# Patient Record
Sex: MALE | Race: WHITE | NOT HISPANIC OR LATINO | Employment: OTHER | URBAN - METROPOLITAN AREA
[De-identification: names, ages, dates, MRNs, and addresses within clinical notes are randomized per-mention and may not be internally consistent; named-entity substitution may affect disease eponyms.]

---

## 2017-05-18 ENCOUNTER — OFFICE VISIT (OUTPATIENT)
Dept: RHEUMATOLOGY | Facility: CLINIC | Age: 77
End: 2017-05-18
Payer: MEDICARE

## 2017-05-18 VITALS
WEIGHT: 248.5 LBS | HEIGHT: 72 IN | BODY MASS INDEX: 33.66 KG/M2 | SYSTOLIC BLOOD PRESSURE: 134 MMHG | DIASTOLIC BLOOD PRESSURE: 76 MMHG

## 2017-05-18 DIAGNOSIS — M1A.09X0 IDIOPATHIC CHRONIC GOUT OF MULTIPLE SITES WITHOUT TOPHUS: Primary | ICD-10-CM

## 2017-05-18 DIAGNOSIS — M05.79 RHEUMATOID ARTHRITIS INVOLVING MULTIPLE SITES WITH POSITIVE RHEUMATOID FACTOR: ICD-10-CM

## 2017-05-18 LAB
ALBUMIN SERPL-MCNC: 4.7 G/DL (ref 3.1–4.7)
ALP SERPL-CCNC: 60 IU/L (ref 40–104)
ALT (SGPT): 22 IU/L (ref 3–33)
AST SERPL-CCNC: 23 IU/L (ref 10–40)
BASOPHILS NFR BLD: 0.1 K/UL (ref 0–0.2)
BASOPHILS NFR BLD: 1.1 %
BILIRUB SERPL-MCNC: 1.4 MG/DL (ref 0.3–1)
BUN SERPL-MCNC: 17 MG/DL (ref 8–20)
CALCIUM SERPL-MCNC: 9.5 MG/DL (ref 7.7–10.4)
CHLORIDE: 103 MMOL/L (ref 98–110)
CO2 SERPL-SCNC: 31.4 MMOL/L (ref 22.8–31.6)
CREATININE: 1.1 MG/DL (ref 0.6–1.4)
CRP SERPL-MCNC: 0.32 MG/DL (ref 0–1.4)
EOSINOPHIL NFR BLD: 0.2 K/UL (ref 0–0.7)
EOSINOPHIL NFR BLD: 2.3 %
ERYTHROCYTE [DISTWIDTH] IN BLOOD BY AUTOMATED COUNT: 13.2 % (ref 11.7–14.9)
GLUCOSE: 99 MG/DL (ref 70–99)
GRAN #: 3.9 K/UL (ref 1.4–6.5)
GRAN%: 60.5 %
HCT VFR BLD AUTO: 39.6 % (ref 39–55)
HGB BLD-MCNC: 13.7 G/DL (ref 14–16)
IMMATURE GRANS (ABS): 0 K/UL (ref 0–1)
IMMATURE GRANULOCYTES: 0.3 %
LYMPH #: 1.7 K/UL (ref 1.2–3.4)
LYMPH%: 26.6 %
MCH RBC QN AUTO: 31.9 PG (ref 25–35)
MCHC RBC AUTO-ENTMCNC: 34.6 G/DL (ref 31–36)
MCV RBC AUTO: 92.3 FL (ref 80–100)
MONO #: 0.6 K/UL (ref 0.1–0.6)
MONO%: 9.2 %
NUCLEATED RBCS: 0 %
PLATELET # BLD AUTO: 128 K/UL (ref 140–440)
PMV BLD AUTO: 11.2 FL (ref 8.8–12.7)
POTASSIUM SERPL-SCNC: 4.6 MMOL/L (ref 3.5–5)
PROT SERPL-MCNC: 7.8 G/DL (ref 6–8.2)
RBC # BLD AUTO: 4.29 M/UL (ref 4.3–5.9)
SODIUM: 141 MMOL/L (ref 134–144)
WBC # BLD AUTO: 6.5 K/UL (ref 5–10)

## 2017-05-18 PROCEDURE — 1160F RVW MEDS BY RX/DR IN RCRD: CPT | Mod: ,,, | Performed by: INTERNAL MEDICINE

## 2017-05-18 PROCEDURE — 1159F MED LIST DOCD IN RCRD: CPT | Mod: ,,, | Performed by: INTERNAL MEDICINE

## 2017-05-18 PROCEDURE — 99214 OFFICE O/P EST MOD 30 MIN: CPT | Mod: ,,, | Performed by: INTERNAL MEDICINE

## 2017-05-18 PROCEDURE — 3075F SYST BP GE 130 - 139MM HG: CPT | Mod: ,,, | Performed by: INTERNAL MEDICINE

## 2017-05-18 PROCEDURE — 1157F ADVNC CARE PLAN IN RCRD: CPT | Mod: 8P,,, | Performed by: INTERNAL MEDICINE

## 2017-05-18 PROCEDURE — 3078F DIAST BP <80 MM HG: CPT | Mod: ,,, | Performed by: INTERNAL MEDICINE

## 2017-05-18 RX ORDER — POTASSIUM CHLORIDE 750 MG/1
10 TABLET, EXTENDED RELEASE ORAL DAILY
COMMUNITY

## 2017-05-18 RX ORDER — BENAZEPRIL HYDROCHLORIDE 40 MG/1
40 TABLET ORAL
COMMUNITY

## 2017-05-18 RX ORDER — BUMETANIDE 2 MG/1
1 TABLET ORAL
COMMUNITY
End: 2018-09-25 | Stop reason: CLARIF

## 2017-05-18 RX ORDER — ALLOPURINOL 300 MG/1
300 TABLET ORAL DAILY
Qty: 90 TABLET | Refills: 3 | Status: SHIPPED | OUTPATIENT
Start: 2017-05-18 | End: 2017-09-21 | Stop reason: SDUPTHER

## 2017-05-18 RX ORDER — ALLOPURINOL 300 MG/1
300 TABLET ORAL DAILY
COMMUNITY
End: 2017-05-18 | Stop reason: SDUPTHER

## 2017-05-18 RX ORDER — METOPROLOL TARTRATE 50 MG/1
50 TABLET ORAL
COMMUNITY
End: 2018-09-25 | Stop reason: CLARIF

## 2017-05-18 RX ORDER — TERAZOSIN 5 MG/1
5 CAPSULE ORAL NIGHTLY
COMMUNITY

## 2017-05-18 RX ORDER — HYDROXYCHLOROQUINE SULFATE 200 MG/1
200 TABLET, FILM COATED ORAL 2 TIMES DAILY
COMMUNITY
End: 2018-01-08 | Stop reason: SDUPTHER

## 2017-05-18 RX ORDER — WARFARIN SODIUM 5 MG/1
10 TABLET ORAL
Status: ON HOLD | COMMUNITY
End: 2022-07-13 | Stop reason: HOSPADM

## 2017-05-18 NOTE — PROGRESS NOTES
Subjective:       Patient ID: Kennedy Thomas is a 76 y.o. male.    Chief Complaint: Rheumatoid Arthritis and Gout  The patient has noted a bit more stiffness in several of his digits. This however is not interfering with his ability to perform his ADLs. He has not noted any particular swelling, warmth or redness.  HPI  Review of Systems  the review of systems is negative for rheumatologic phenomena  Objective:      Physical Exam  the physical examination of the joints reveals no red, hot or swollen joints. There is some tenderness noted in the right second and fourth PIP joints. There may be a degree of synovial proliferation present there but there does not seem to be active synovitis. The MCP joints, particularly on the palmar aspect of the hand, demonstrate no inflammation or tenderness. There is also no tendon rub or Dupuytren's-type contracture present.  Assessment:       1. Idiopathic chronic gout of multiple sites without tophus    2. Rheumatoid arthritis involving multiple sites with positive rheumatoid factor      Both disease processes are stable.  Plan:       Kennedy was seen today for rheumatoid arthritis and gout.    Diagnoses and all orders for this visit:    Idiopathic chronic gout of multiple sites without tophus  -     allopurinol (ZYLOPRIM) 300 MG tablet; Take 1 tablet (300 mg total) by mouth once daily.  -     Sedimentation rate, automated; Future    Rheumatoid arthritis involving multiple sites with positive rheumatoid factor  -     CBC auto differential; Future  -     C-reactive protein; Future  -     CBC auto differential  -     C-reactive protein  -     Comprehensive metabolic panel; Future  -     Comprehensive metabolic panel       We will continue his medication without change. I have instructed the patient on the use of a paraffin wax bath at home, to be followed by range of motion exercises utilizing [employer]. I believe the patient understands the instructions and may or may not follow-up  on obtaining the wax bath. I did give him several sources for obtaining the machine and an estimated price of $40-$50.  I will see him back in 4 months or sooner if needed.

## 2017-05-18 NOTE — MR AVS SNAPSHOT
[facility]  [address]  [address]  [place] [address]  Phone: [phone]  Fax: [phone]                  Kennedy Thomas   2017 11:00 AM   Office Visit    Description:  Male : 1940   Provider:  Piero Veronica MD   Department:  [facility]           Reason for Visit     Rheumatoid Arthritis     Gout           Diagnoses this Visit        Comments    Idiopathic chronic gout of multiple sites without tophus    -  Primary     Rheumatoid arthritis involving multiple sites with positive rheumatoid factor                To Do List           Future Appointments        Provider Department Dept Phone    2017 10:30 AM Piero Veronica MD [facility] [phone]      Goals (5 Years of Data)     None      Follow-Up and Disposition     Return in about 4 months (around 2017).    Follow-up and Disposition History       These Medications        Disp Refills Start End    allopurinol (ZYLOPRIM) 300 MG tablet 90 tablet 3 2017     Take 1 tablet (300 mg total) by mouth once daily. - Oral    Pharmacy: [facility] [address] [place] [address] & [facility] Ph #: [phone]              Medications           Message regarding Medications     Verify the changes and/or additions to your medication regime listed below are the same as discussed with your clinician today.  If any of these changes or additions are incorrect, please notify your healthcare provider.        START taking these NEW medications        Refills    allopurinol (ZYLOPRIM) 300 MG tablet 3    Sig: Take 1 tablet (300 mg total) by mouth once daily.    Class: Normal    Route: Oral           Verify that the below list of medications is an accurate representation of the medications you are currently taking.  If none reported, the list may be blank. If incorrect, please contact your healthcare provider. Carry this list with you  in case of emergency.           Current Medications     allopurinol (ZYLOPRIM) 300 MG tablet Take 1 tablet (300 mg total) by mouth once daily.    benazepril (LOTENSIN) 40 MG tablet Take 40 mg by mouth.    bumetanide (BUMEX) 2 MG tablet Take 2 mg by mouth.    hydroxychloroquine (PLAQUENIL) 200 mg tablet Take 200 mg by mouth 2 (two) times daily.    metoprolol tartrate (LOPRESSOR) 50 MG tablet 50 mg.    potassium chloride (KLOR-CON) 10 MEQ TbSR Take 10 mEq by mouth once daily.    terazosin (HYTRIN) 5 MG capsule Take 5 mg by mouth.    warfarin (COUMADIN) 5 MG tablet Take 5 mg by mouth.           Clinical Reference Information           Your Vitals Were     BP Height Weight BMI       134/76 6' (1.829 m) 112.7 kg (248 lb 8 oz) 33.7 kg/m2       Blood Pressure          Most Recent Value    BP  134/76      Allergies as of 5/18/2017     Statins-hmg-coa Reductase Inhibitors      Immunizations Administered on Date of Encounter - 5/18/2017     None      Orders Placed During Today's Visit     Future Labs/Procedures Expected by Expires    C-reactive protein  5/18/2017 7/17/2018    CBC auto differential  5/18/2017 7/17/2018    Comprehensive metabolic panel  5/18/2017 7/17/2018    Sedimentation rate, automated  5/18/2017 7/17/2018

## 2017-09-21 ENCOUNTER — OFFICE VISIT (OUTPATIENT)
Dept: RHEUMATOLOGY | Facility: CLINIC | Age: 77
End: 2017-09-21
Payer: MEDICARE

## 2017-09-21 VITALS
SYSTOLIC BLOOD PRESSURE: 132 MMHG | DIASTOLIC BLOOD PRESSURE: 80 MMHG | BODY MASS INDEX: 32.3 KG/M2 | HEIGHT: 74 IN | WEIGHT: 251.69 LBS

## 2017-09-21 DIAGNOSIS — M10.9 GOUT, UNSPECIFIED CAUSE, UNSPECIFIED CHRONICITY, UNSPECIFIED SITE: Primary | ICD-10-CM

## 2017-09-21 DIAGNOSIS — Z79.899 ENCOUNTER FOR LONG-TERM (CURRENT) USE OF OTHER MEDICATIONS: ICD-10-CM

## 2017-09-21 DIAGNOSIS — M1A.09X0 IDIOPATHIC CHRONIC GOUT OF MULTIPLE SITES WITHOUT TOPHUS: ICD-10-CM

## 2017-09-21 DIAGNOSIS — M19.90 CHRONIC INFLAMMATORY ARTHRITIS: ICD-10-CM

## 2017-09-21 PROCEDURE — 3075F SYST BP GE 130 - 139MM HG: CPT | Mod: ,,, | Performed by: INTERNAL MEDICINE

## 2017-09-21 PROCEDURE — 99213 OFFICE O/P EST LOW 20 MIN: CPT | Mod: ,,, | Performed by: INTERNAL MEDICINE

## 2017-09-21 PROCEDURE — 1159F MED LIST DOCD IN RCRD: CPT | Mod: ,,, | Performed by: INTERNAL MEDICINE

## 2017-09-21 PROCEDURE — 3008F BODY MASS INDEX DOCD: CPT | Mod: ,,, | Performed by: INTERNAL MEDICINE

## 2017-09-21 PROCEDURE — 3079F DIAST BP 80-89 MM HG: CPT | Mod: ,,, | Performed by: INTERNAL MEDICINE

## 2017-09-21 RX ORDER — ALLOPURINOL 300 MG/1
300 TABLET ORAL DAILY
Qty: 90 TABLET | Refills: 3 | Status: SHIPPED | OUTPATIENT
Start: 2017-09-21 | End: 2018-10-03 | Stop reason: SDUPTHER

## 2017-09-21 NOTE — PROGRESS NOTES
[facility]ATOLOG[facility]           Follow-up visit      Subjective:       Patient ID:   NAME: Kennedy Thomas : 1940     76 y.o. male    Referring Doc: No ref. provider found  Other Physicians:    Chief Complaint:  Gout (under both feet feel like big calluses pt states he cant feel any thing)      HPI/Interval History:   The patient is doing fine and has no problems with gouty arthritis.          ROS:   GEN: no fever, night sweats or weight loss  SKIN:  no rashes , erythema, bruising, or swelling, no Raynauds, no photosensitivity  HEENT: no HAs, no changes in vision, no mouth ulcers, no sicca symptoms, no scalp tenderness, jaw claudication.  CV: no CP, SOB, PND, PATTERSON or orthopnea,no palpitations  PULM: no SOB, cough, hemoptysis, sputum or pleuritic pain  GI: no abdominal pain, nausea, vomiting, constipation, diarrhea, melanotic stools, BRBPR, or hematemesis.no dysphagia  : no hematuria, dysuria  NEURO:no paresthesias, headaches, visual disturbances, muscle weakness  MUSCULOSKELETAL: no of acute gouty episodes  PSYCH: No insomnia, no significant depression, no anxiety    Medications:    Current Outpatient Prescriptions:     allopurinol (ZYLOPRIM) 300 MG tablet, Take 1 tablet (300 mg total) by mouth once daily., Disp: 90 tablet, Rfl: 3    benazepril (LOTENSIN) 40 MG tablet, Take 40 mg by mouth., Disp: , Rfl:     bumetanide (BUMEX) 2 MG tablet, Take 2 mg by mouth., Disp: , Rfl:     hydroxychloroquine (PLAQUENIL) 200 mg tablet, Take 200 mg by mouth 2 (two) times daily., Disp: , Rfl:     metoprolol tartrate (LOPRESSOR) 50 MG tablet, 50 mg., Disp: , Rfl:     potassium chloride (KLOR-CON) 10 MEQ TbSR, Take 10 mEq by mouth once daily., Disp: , Rfl:     terazosin (HYTRIN) 5 MG capsule, Take 5 mg by mouth., Disp: , Rfl:     warfarin (COUMADIN) 5 MG tablet, Take 5 mg by mouth., Disp: , Rfl:   FAMILY HISTORY: negative for Connective Tissue Disease        Review of patient's allergies indicates:   Allergen  "Reactions    Statins-hmg-coa reductase inhibitors              Objective:     Vitals:  Blood pressure 132/80, height 6' 2" (1.88 m), weight 114.2 kg (251 lb 11.2 oz).    Physical Examination:   GEN: wn/wd male in no apparent distress; AAOx3  SKIN: no rashes, no lesions, no sclerodactyly, no Raynaud's, no periungual erythema  HEAD: no alopecia, no scalp tenderness, no temporal artery tenderness or induration.  EYES: no pallor, no icterus, PERRLA  ENT:  no thrush, no mucosal dryness or ulcerations, adequate oral hygiene & dentition.  NECK: supple x 6, no masses, no thyromegaly, no lymphadenopathy.  CV:   S1 and S2 regular, no murmurs, gallop or rubs  CHEST: Normal respiratory effort;  normal breath sounds/no adventitious sounds. No signs of consolidation.  ABD: non-tender and non-distended; soft; normal bowel sounds; no rebound/guarding or tenderness. No hepatosplenomegaly.  Musculoskeletal:  No evidence of inflammatory arthritis or gouty arthritis.  EXTREM: no clubbing, cyanosis or edema. normal pulses.  NEURO: grossly intact; motor/sensory WNL; AAOx3; no tremors  PSYCH: normal mood, affect and behavior            Labs:   Lab Results   Component Value Date    WBC 6.5 05/18/2017    HGB 13.7 (L) 05/18/2017    HCT 39.6 05/18/2017    MCV 92.3 05/18/2017     (L) 05/18/2017   CMP@  Sodium   Date Value Ref Range Status   05/18/2017 141 134 - 144 mmol/L      Potassium   Date Value Ref Range Status   05/18/2017 4.6 3.5 - 5.0 mmol/L      Chloride   Date Value Ref Range Status   05/18/2017 103 98 - 110 mmol/L      CO2   Date Value Ref Range Status   05/18/2017 31.4 22.8 - 31.6 mmol/L      Glucose   Date Value Ref Range Status   05/18/2017 99 70 - 99 mg/dL      BUN, Bld   Date Value Ref Range Status   05/18/2017 17 8 - 20 mg/dL      Creatinine   Date Value Ref Range Status   05/18/2017 1.10 0.60 - 1.40 mg/dL      Calcium   Date Value Ref Range Status   05/18/2017 9.5 7.7 - 10.4 mg/dL      Total Protein   Date Value Ref " Range Status   05/18/2017 7.8 6.0 - 8.2 g/dL      Albumin   Date Value Ref Range Status   05/18/2017 4.7 3.1 - 4.7 g/dL      Total Bilirubin   Date Value Ref Range Status   05/18/2017 1.4 (H) 0.3 - 1.0 mg/dL      Alkaline Phosphatase   Date Value Ref Range Status   05/18/2017 60 40 - 104 IU/L      AST   Date Value Ref Range Status   05/18/2017 23 10 - 40 IU/L      CRP   Date Value Ref Range Status   05/18/2017 0.32 0.00 - 1.40 mg/dL          Radiology/Diagnostic Studies:    No x-rays are available    Assessment/Discussion/Plan:   76 y.o. male with chronic gout-stable        PLAN:   I will continue his medications without change. Routine blood testing was obtained. I have suggested that he schedule an appointment with his podiatrist for the evaluation of extensive calluses.      RTC:   I will see him back in 4 months.      Electronically signed by Piero Veronica MD

## 2017-09-27 LAB
ALBUMIN SERPL-MCNC: 4.4 G/DL (ref 3.6–5.1)
ALBUMIN/GLOB SERPL: 1.9 (CALC) (ref 1–2.5)
ALP SERPL-CCNC: 63 U/L (ref 40–115)
ALT SERPL-CCNC: 14 U/L (ref 9–46)
AST SERPL-CCNC: 14 U/L (ref 10–35)
BASOPHILS # BLD AUTO: 78 CELLS/UL (ref 0–200)
BASOPHILS NFR BLD AUTO: 1.3 %
BILIRUB SERPL-MCNC: 0.9 MG/DL (ref 0.2–1.2)
BUN SERPL-MCNC: 18 MG/DL (ref 7–25)
BUN/CREAT SERPL: ABNORMAL (CALC) (ref 6–22)
CALCIUM SERPL-MCNC: 9.5 MG/DL (ref 8.6–10.3)
CHLORIDE SERPL-SCNC: 104 MMOL/L (ref 98–110)
CO2 SERPL-SCNC: 29 MMOL/L (ref 20–31)
CREAT SERPL-MCNC: 1.04 MG/DL (ref 0.7–1.18)
CRP SERPL-MCNC: 3.4 MG/L
EOSINOPHIL # BLD AUTO: 192 CELLS/UL (ref 15–500)
EOSINOPHIL NFR BLD AUTO: 3.2 %
ERYTHROCYTE [DISTWIDTH] IN BLOOD BY AUTOMATED COUNT: 12.8 % (ref 11–15)
GFR SERPL CREATININE-BSD FRML MDRD: 70 ML/MIN/1.73M2
GLOBULIN SER CALC-MCNC: 2.3 G/DL (CALC) (ref 1.9–3.7)
GLUCOSE SERPL-MCNC: 153 MG/DL (ref 65–99)
HCT VFR BLD AUTO: 39.7 % (ref 38.5–50)
HGB BLD-MCNC: 13.5 G/DL (ref 13.2–17.1)
LYMPHOCYTES # BLD AUTO: 1542 CELLS/UL (ref 850–3900)
LYMPHOCYTES NFR BLD AUTO: 25.7 %
MCH RBC QN AUTO: 31.7 PG (ref 27–33)
MCHC RBC AUTO-ENTMCNC: 34 G/DL (ref 32–36)
MCV RBC AUTO: 93.2 FL (ref 80–100)
MONOCYTES # BLD AUTO: 564 CELLS/UL (ref 200–950)
MONOCYTES NFR BLD AUTO: 9.4 %
NEUTROPHILS # BLD AUTO: 3624 CELLS/UL (ref 1500–7800)
NEUTROPHILS NFR BLD AUTO: 60.4 %
PLATELET # BLD AUTO: 132 THOUSAND/UL (ref 140–400)
PMV BLD REES-ECKER: 11 FL (ref 7.5–12.5)
POTASSIUM SERPL-SCNC: 4.4 MMOL/L (ref 3.5–5.3)
PROT SERPL-MCNC: 6.7 G/DL (ref 6.1–8.1)
RBC # BLD AUTO: 4.26 MILLION/UL (ref 4.2–5.8)
SODIUM SERPL-SCNC: 141 MMOL/L (ref 135–146)
URATE SERPL-MCNC: 3.9 MG/DL (ref 4–8)
WBC # BLD AUTO: 6 THOUSAND/UL (ref 3.8–10.8)

## 2018-01-08 DIAGNOSIS — M19.90 CHRONIC INFLAMMATORY ARTHRITIS: Primary | ICD-10-CM

## 2018-01-08 RX ORDER — HYDROXYCHLOROQUINE SULFATE 200 MG/1
TABLET, FILM COATED ORAL
Qty: 180 TABLET | Refills: 1 | Status: SHIPPED | OUTPATIENT
Start: 2018-01-08 | End: 2018-07-06 | Stop reason: SDUPTHER

## 2018-01-18 ENCOUNTER — OFFICE VISIT (OUTPATIENT)
Dept: RHEUMATOLOGY | Facility: CLINIC | Age: 78
End: 2018-01-18
Payer: MEDICARE

## 2018-01-18 VITALS
DIASTOLIC BLOOD PRESSURE: 78 MMHG | SYSTOLIC BLOOD PRESSURE: 138 MMHG | HEIGHT: 73 IN | BODY MASS INDEX: 33.27 KG/M2 | WEIGHT: 251 LBS

## 2018-01-18 DIAGNOSIS — Z79.899 ENCOUNTER FOR LONG-TERM (CURRENT) DRUG USE: ICD-10-CM

## 2018-01-18 DIAGNOSIS — M10.9 GOUT, UNSPECIFIED CAUSE, UNSPECIFIED CHRONICITY, UNSPECIFIED SITE: Primary | ICD-10-CM

## 2018-01-18 DIAGNOSIS — M05.79 RHEUMATOID ARTHRITIS INVOLVING MULTIPLE SITES WITH POSITIVE RHEUMATOID FACTOR: ICD-10-CM

## 2018-01-18 PROCEDURE — 99213 OFFICE O/P EST LOW 20 MIN: CPT | Mod: ,,, | Performed by: INTERNAL MEDICINE

## 2018-01-18 NOTE — PROGRESS NOTES
[facility] RHEUMATOLOGY           Follow-up visit      Subjective:       Patient ID:   NAME: Kennedy Thomas : 1940     77 y.o. male    Referring Doc: No ref. provider found  Other Physicians:    Chief Complaint:  No chief complaint on file.      HPI/Interval History:     The patient is doing well. He has had no episodes of gout. He has no complaints of joint pain, redness, warmth, or swelling.          ROS:   GEN:    no fever, night sweats or weight loss  SKIN:   no rashes , erythema, bruising, or swelling, no Raynauds, no photosensitivity  HEENT: no HAs, no changes in vision, no mouth ulcers, no sicca symptoms, no scalp tenderness, jaw claudication.  CV:      no CP, SOB, PND, PATTERSON or orthopnea,no palpitations  PULM: no SOB, cough, hemoptysis, sputum or pleuritic pain  GI:      no abdominal pain, nausea, vomiting, constipation, diarrhea, melanotic stools, BRBPR, or hematemesis, no dysphagia, no GERD  :     no hematuria, dysuria  NEURO: no paresthesias, headaches, visual disturbances  MUSCULOSKELETAL:  No complaints of muscle or joint pain  PSYCH:   No insomnia, no significant depression, no anxiety    Medications:    Current Outpatient Prescriptions:     allopurinol (ZYLOPRIM) 300 MG tablet, Take 1 tablet (300 mg total) by mouth once daily., Disp: 90 tablet, Rfl: 3    benazepril (LOTENSIN) 40 MG tablet, Take 40 mg by mouth., Disp: , Rfl:     bumetanide (BUMEX) 2 MG tablet, Take 2 mg by mouth., Disp: , Rfl:     hydroxychloroquine (PLAQUENIL) 200 mg tablet, TAKE 1 TABLET BY MOUTH TWICE DAILY, Disp: 180 tablet, Rfl: 1    metoprolol tartrate (LOPRESSOR) 50 MG tablet, 50 mg., Disp: , Rfl:     potassium chloride (KLOR-CON) 10 MEQ TbSR, Take 10 mEq by mouth once daily., Disp: , Rfl:     terazosin (HYTRIN) 5 MG capsule, Take 5 mg by mouth., Disp: , Rfl:     warfarin (COUMADIN) 5 MG tablet, Take 5 mg by mouth., Disp: , Rfl:       FAMILY HISTORY: negative for Connective Tissue Disease        Review of  "patient's allergies indicates:   Allergen Reactions    Statins-hmg-coa reductase inhibitors              Objective:     Vitals:  Blood pressure 138/78, height 6' 1" (1.854 m), weight 113.9 kg (251 lb).    Physical Examination:   GEN: wn/wd male in no apparent distress  SKIN: no rashes, no lesions, no sclerodactyly, no Raynaud's, no periungual erythema  HEAD: no alopecia, no scalp tenderness, no temporal artery tenderness or induration.  EYES: no pallor, no icterus, PERRLA  ENT:  no thrush, no mucosal dryness or ulcerations, adequate oral hygiene & dentition.  NECK: supple x 6, no masses, no thyromegaly, no lymphadenopathy.  CV:   S1 and S2 regular, no murmurs, gallop or rubs  CHEST: Normal respiratory effort;  normal breath sounds/no adventitious sounds. No signs of consolidation.  ABD: non-tender and non-distended; soft; normal bowel sounds; no rebound/guarding or tenderness. No hepatosplenomegaly.  Musculoskeletal:  No evidence of inflammatory arthritis or a progressive deformity. No tophi  EXTREM: no clubbing, cyanosis or edema. normal pulses.  NEURO: grossly intact; motor/sensory WNL; AAOx3; no tremors  PSYCH:  normal mood, affect and behavior            Labs:   Lab Results   Component Value Date    WBC 6.0 09/26/2017    HGB 13.5 09/26/2017    HCT 39.7 09/26/2017    MCV 93.2 09/26/2017     (L) 09/26/2017   CMP@  Sodium   Date Value Ref Range Status   09/26/2017 141 135 - 146 mmol/L Final   05/18/2017 141 134 - 144 mmol/L      Potassium   Date Value Ref Range Status   09/26/2017 4.4 3.5 - 5.3 mmol/L Final     Chloride   Date Value Ref Range Status   09/26/2017 104 98 - 110 mmol/L Final   05/18/2017 103 98 - 110 mmol/L      CO2   Date Value Ref Range Status   09/26/2017 29 20 - 31 mmol/L Final     Glucose   Date Value Ref Range Status   09/26/2017 153 (H) 65 - 99 mg/dL Final     Comment:                   Fasting reference interval     For someone without known diabetes, a glucose  value >125 mg/dL " indicates that they may have  diabetes and this should be confirmed with a  follow-up test.        05/18/2017 99 70 - 99 mg/dL      BUN, Bld   Date Value Ref Range Status   09/26/2017 18 7 - 25 mg/dL Final     Creatinine   Date Value Ref Range Status   09/26/2017 1.04 0.70 - 1.18 mg/dL Final     Comment:     For patients >49 years of age, the reference limit  for Creatinine is approximately 13% higher for people  identified as -American.        05/18/2017 1.10 0.60 - 1.40 mg/dL      Calcium   Date Value Ref Range Status   09/26/2017 9.5 8.6 - 10.3 mg/dL Final     Total Protein   Date Value Ref Range Status   09/26/2017 6.7 6.1 - 8.1 g/dL Final     Albumin   Date Value Ref Range Status   09/26/2017 4.4 3.6 - 5.1 g/dL Final   05/18/2017 4.7 3.1 - 4.7 g/dL      Total Bilirubin   Date Value Ref Range Status   09/26/2017 0.9 0.2 - 1.2 mg/dL Final     Alkaline Phosphatase   Date Value Ref Range Status   09/26/2017 63 40 - 115 U/L Final     AST   Date Value Ref Range Status   09/26/2017 14 10 - 35 U/L Final     ALT   Date Value Ref Range Status   09/26/2017 14 9 - 46 U/L Final     CRP   Date Value Ref Range Status   09/26/2017 3.4 <8.0 mg/L Final     Comment:     **Please note recent reference range and units of   measure changes**       Uric Acid   Date Value Ref Range Status   09/26/2017 3.9 (L) 4.0 - 8.0 mg/dL Final     Comment:     Therapeutic target for gout patients: <6.0 mg/dL               Radiology/Diagnostic Studies:    none    Assessment/Discussion/Plan:   77 y.o. male with rheumatoid arthritis and gout-both well controlled      PLAN:  I will continue his medication without change. Routine blood testing was ordered and will be obtained at an outpatient facility.      RTC:  I will see him back in 4 months.      Electronically signed by Piero Veronica MD

## 2018-01-19 LAB
ALBUMIN SERPL-MCNC: 4.3 G/DL (ref 3.6–5.1)
ALBUMIN/GLOB SERPL: 1.7 (CALC) (ref 1–2.5)
ALP SERPL-CCNC: 58 U/L (ref 40–115)
ALT SERPL-CCNC: 24 U/L (ref 9–46)
AST SERPL-CCNC: 21 U/L (ref 10–35)
BASOPHILS # BLD AUTO: 58 CELLS/UL (ref 0–200)
BASOPHILS NFR BLD AUTO: 1 %
BILIRUB SERPL-MCNC: 1.1 MG/DL (ref 0.2–1.2)
BUN SERPL-MCNC: 17 MG/DL (ref 7–25)
BUN/CREAT SERPL: ABNORMAL (CALC) (ref 6–22)
CALCIUM SERPL-MCNC: 9.4 MG/DL (ref 8.6–10.3)
CHLORIDE SERPL-SCNC: 103 MMOL/L (ref 98–110)
CO2 SERPL-SCNC: 32 MMOL/L (ref 20–31)
CREAT SERPL-MCNC: 0.92 MG/DL (ref 0.7–1.18)
EOSINOPHIL # BLD AUTO: 110 CELLS/UL (ref 15–500)
EOSINOPHIL NFR BLD AUTO: 1.9 %
ERYTHROCYTE [DISTWIDTH] IN BLOOD BY AUTOMATED COUNT: 12.8 % (ref 11–15)
ERYTHROCYTE [SEDIMENTATION RATE] IN BLOOD BY WESTERGREN METHOD: 10 MM/H
GFR SERPL CREATININE-BSD FRML MDRD: 81 ML/MIN/1.73M2
GLOBULIN SER CALC-MCNC: 2.5 G/DL (CALC) (ref 1.9–3.7)
GLUCOSE SERPL-MCNC: 107 MG/DL (ref 65–99)
HCT VFR BLD AUTO: 36.6 % (ref 38.5–50)
HGB BLD-MCNC: 12.5 G/DL (ref 13.2–17.1)
LYMPHOCYTES # BLD AUTO: 1444 CELLS/UL (ref 850–3900)
LYMPHOCYTES NFR BLD AUTO: 24.9 %
MCH RBC QN AUTO: 30.9 PG (ref 27–33)
MCHC RBC AUTO-ENTMCNC: 34.2 G/DL (ref 32–36)
MCV RBC AUTO: 90.6 FL (ref 80–100)
MONOCYTES # BLD AUTO: 510 CELLS/UL (ref 200–950)
MONOCYTES NFR BLD AUTO: 8.8 %
NEUTROPHILS # BLD AUTO: 3677 CELLS/UL (ref 1500–7800)
NEUTROPHILS NFR BLD AUTO: 63.4 %
PLATELET # BLD AUTO: 177 THOUSAND/UL (ref 140–400)
PMV BLD REES-ECKER: 11.2 FL (ref 7.5–12.5)
POTASSIUM SERPL-SCNC: 4.4 MMOL/L (ref 3.5–5.3)
PROT SERPL-MCNC: 6.8 G/DL (ref 6.1–8.1)
RBC # BLD AUTO: 4.04 MILLION/UL (ref 4.2–5.8)
RHEUMATOID FACT SERPL-ACNC: <14 IU/ML
SODIUM SERPL-SCNC: 141 MMOL/L (ref 135–146)
URATE SERPL-MCNC: 3.5 MG/DL (ref 4–8)
WBC # BLD AUTO: 5.8 THOUSAND/UL (ref 3.8–10.8)

## 2018-05-28 ENCOUNTER — OFFICE VISIT (OUTPATIENT)
Dept: RHEUMATOLOGY | Facility: CLINIC | Age: 78
End: 2018-05-28
Payer: MEDICARE

## 2018-05-28 VITALS
BODY MASS INDEX: 33.13 KG/M2 | WEIGHT: 251.13 LBS | DIASTOLIC BLOOD PRESSURE: 88 MMHG | SYSTOLIC BLOOD PRESSURE: 128 MMHG

## 2018-05-28 DIAGNOSIS — M05.79 RHEUMATOID ARTHRITIS INVOLVING MULTIPLE SITES WITH POSITIVE RHEUMATOID FACTOR: ICD-10-CM

## 2018-05-28 DIAGNOSIS — M10.9 GOUT, UNSPECIFIED CAUSE, UNSPECIFIED CHRONICITY, UNSPECIFIED SITE: Primary | ICD-10-CM

## 2018-05-28 DIAGNOSIS — Z79.899 ENCOUNTER FOR LONG-TERM (CURRENT) DRUG USE: ICD-10-CM

## 2018-05-28 PROCEDURE — 99213 OFFICE O/P EST LOW 20 MIN: CPT | Mod: ,,, | Performed by: INTERNAL MEDICINE

## 2018-05-28 PROCEDURE — 3074F SYST BP LT 130 MM HG: CPT | Mod: ,,, | Performed by: INTERNAL MEDICINE

## 2018-05-28 PROCEDURE — 3079F DIAST BP 80-89 MM HG: CPT | Mod: ,,, | Performed by: INTERNAL MEDICINE

## 2018-05-28 NOTE — PROGRESS NOTES
[facility] RHEUMATOLOG[facility]           Follow-up visit    Notes dictated via Dragon to EPIC. Please forgive any unintentional errors.  Subjective:       Patient ID:   NAME: Kennedy Thomas : 1940     77 y.o. male    Referring Doc: No ref. provider found  Other Physicians:    Chief Complaint:  Gout (Bumps on both feet. Takes Allopurinol 300mg QD,Plaquenil 200mg BID)      HPI/Interval History:   The patient has had no episodes of gout. He has had no issues with red, hot, and/or swollen joints.          ROS:   GEN:    no fever, night sweats or weight loss  SKIN:   no rashes , erythema, bruising, or swelling, no Raynauds, no photosensitivity  HEENT: no changes in vision, no mouth ulcers, no sicca symptoms, no scalp tenderness, no jaw claudication.  CV:      no CP, PND, PATTERSON or orthopnea, no palpitations  PULM: no SOB, cough, hemoptysis, sputum or pleuritic pain  GI:       no abdominal pain, nausea, vomiting, constipation, diarrhea, melanotic stools, BRBPR, or hematemesis, no dysphagia, no GERD  :     no hematuria, dysuria  NEURO: no paresthesias, headaches, acute visual disturbances  MUSCULOSKELETAL:  No muscle or joint complaints. He does have some discomfort on the bottom of his foot  PSYCH:   No insomnia, no significant anxiety or depression    Medications:    Current Outpatient Prescriptions:     allopurinol (ZYLOPRIM) 300 MG tablet, Take 1 tablet (300 mg total) by mouth once daily., Disp: 90 tablet, Rfl: 3    benazepril (LOTENSIN) 40 MG tablet, Take 40 mg by mouth., Disp: , Rfl:     bumetanide (BUMEX) 2 MG tablet, Take 2 mg by mouth., Disp: , Rfl:     hydroxychloroquine (PLAQUENIL) 200 mg tablet, TAKE 1 TABLET BY MOUTH TWICE DAILY, Disp: 180 tablet, Rfl: 1    metoprolol tartrate (LOPRESSOR) 50 MG tablet, 50 mg., Disp: , Rfl:     potassium chloride (KLOR-CON) 10 MEQ TbSR, Take 10 mEq by mouth once daily., Disp: , Rfl:     terazosin (HYTRIN) 5 MG capsule, Take 5 mg by mouth., Disp: , Rfl:     warfarin  (COUMADIN) 5 MG tablet, Take 5 mg by mouth., Disp: , Rfl:       FAMILY HISTORY: negative for Connective Tissue Disease        Review of patient's allergies indicates:   Allergen Reactions    Statins-hmg-coa reductase inhibitors              Objective:     Vitals:  Blood pressure 128/88, weight 113.9 kg (251 lb 1.6 oz).    Physical Examination:   GEN: wn/wd male in no apparent distress  SKIN: no rashes, no lesions, no sclerodactyly, no Raynaud's, no periungual erythema  HEAD: no alopecia, no scalp tenderness, no temporal artery tenderness or induration.  EYES: no pallor, no icterus, PERRLA  ENT:  no thrush, no mucosal dryness or ulcerations, adequate oral hygiene & dentition.  NECK: supple x 6, no masses, no thyromegaly, no lymphadenopathy.  CV:   S1 and S2 regular, no murmurs, gallop or rubs  CHEST: Normal respiratory effort;  normal breath sounds/no adventitious sounds. No signs of consolidation.  ABD: non-tender and non-distended; soft; normal bowel sounds; no rebound/guarding or tenderness. No hepatosplenomegaly.  Musculoskeletal:  No active synovitis. There is a small callus on the plantar surface of the right foot corresponding to just proximal to the third MTP joint  EXTREM: no clubbing, cyanosis or edema. normal pulses.  NEURO: grossly intact; motor/sensory WNL; no tremors  PSYCH:  normal mood, affect and behavior            Labs:   Lab Results   Component Value Date    WBC 5.8 01/18/2018    HGB 12.5 (L) 01/18/2018    HCT 36.6 (L) 01/18/2018    MCV 90.6 01/18/2018     01/18/2018   CMP@  Sodium   Date Value Ref Range Status   01/18/2018 141 135 - 146 mmol/L Final   05/18/2017 141 134 - 144 mmol/L      Potassium   Date Value Ref Range Status   01/18/2018 4.4 3.5 - 5.3 mmol/L Final     Chloride   Date Value Ref Range Status   01/18/2018 103 98 - 110 mmol/L Final   05/18/2017 103 98 - 110 mmol/L      CO2   Date Value Ref Range Status   01/18/2018 32 (H) 20 - 31 mmol/L Final     Glucose   Date Value Ref  Range Status   01/18/2018 107 (H) 65 - 99 mg/dL Final     Comment:                   Fasting reference interval     For someone without known diabetes, a glucose value  between 100 and 125 mg/dL is consistent with  prediabetes and should be confirmed with a  follow-up test.        05/18/2017 99 70 - 99 mg/dL      BUN, Bld   Date Value Ref Range Status   01/18/2018 17 7 - 25 mg/dL Final     Creatinine   Date Value Ref Range Status   01/18/2018 0.92 0.70 - 1.18 mg/dL Final     Comment:     For patients >49 years of age, the reference limit  for Creatinine is approximately 13% higher for people  identified as -American.        05/18/2017 1.10 0.60 - 1.40 mg/dL      Calcium   Date Value Ref Range Status   01/18/2018 9.4 8.6 - 10.3 mg/dL Final     Total Protein   Date Value Ref Range Status   01/18/2018 6.8 6.1 - 8.1 g/dL Final     Albumin   Date Value Ref Range Status   01/18/2018 4.3 3.6 - 5.1 g/dL Final   05/18/2017 4.7 3.1 - 4.7 g/dL      Total Bilirubin   Date Value Ref Range Status   01/18/2018 1.1 0.2 - 1.2 mg/dL Final     Alkaline Phosphatase   Date Value Ref Range Status   01/18/2018 58 40 - 115 U/L Final     AST   Date Value Ref Range Status   01/18/2018 21 10 - 35 U/L Final     ALT   Date Value Ref Range Status   01/18/2018 24 9 - 46 U/L Final     CRP   Date Value Ref Range Status   09/26/2017 3.4 <8.0 mg/L Final     Comment:     **Please note recent reference range and units of   measure changes**       Rheumatoid Factor   Date Value Ref Range Status   01/18/2018 <14 <14 IU/mL Final     Uric Acid   Date Value Ref Range Status   01/18/2018 3.5 (L) 4.0 - 8.0 mg/dL Final     Comment:     Therapeutic target for gout patients: <6.0 mg/dL               Radiology/Diagnostic Studies:    one    Assessment/Discussion/Plan:   77 y.o. male with gout and seronegative rheumatoid arthritis-both stable  2) plantar callus    PLAN:  We will continue his medication unchanged. Routine blood testing was obtained. I have  advised him to see a podiatrist.      RTC:  I will see him back in 4 months.      Electronically signed by Piero Veronica MD

## 2018-05-30 LAB
ALBUMIN SERPL-MCNC: 4.5 G/DL (ref 3.6–5.1)
ALBUMIN/GLOB SERPL: 1.9 (CALC) (ref 1–2.5)
ALP SERPL-CCNC: 73 U/L (ref 40–115)
ALT SERPL-CCNC: 13 U/L (ref 9–46)
AST SERPL-CCNC: 15 U/L (ref 10–35)
BASOPHILS # BLD AUTO: 60 CELLS/UL (ref 0–200)
BASOPHILS NFR BLD AUTO: 1.3 %
BILIRUB SERPL-MCNC: 1.4 MG/DL (ref 0.2–1.2)
BUN SERPL-MCNC: 14 MG/DL (ref 7–25)
BUN/CREAT SERPL: ABNORMAL (CALC) (ref 6–22)
CALCIUM SERPL-MCNC: 9.4 MG/DL (ref 8.6–10.3)
CHLORIDE SERPL-SCNC: 105 MMOL/L (ref 98–110)
CO2 SERPL-SCNC: 31 MMOL/L (ref 20–31)
CREAT SERPL-MCNC: 0.86 MG/DL (ref 0.7–1.18)
CRP SERPL-MCNC: 2.9 MG/L
EOSINOPHIL # BLD AUTO: 179 CELLS/UL (ref 15–500)
EOSINOPHIL NFR BLD AUTO: 3.9 %
ERYTHROCYTE [DISTWIDTH] IN BLOOD BY AUTOMATED COUNT: 13.2 % (ref 11–15)
GFR SERPL CREATININE-BSD FRML MDRD: 85 ML/MIN/1.73M2
GLOBULIN SER CALC-MCNC: 2.4 G/DL (CALC) (ref 1.9–3.7)
GLUCOSE SERPL-MCNC: 97 MG/DL (ref 65–99)
HCT VFR BLD AUTO: 40.8 % (ref 38.5–50)
HGB BLD-MCNC: 13.8 G/DL (ref 13.2–17.1)
LYMPHOCYTES # BLD AUTO: 1431 CELLS/UL (ref 850–3900)
LYMPHOCYTES NFR BLD AUTO: 31.1 %
MCH RBC QN AUTO: 31.1 PG (ref 27–33)
MCHC RBC AUTO-ENTMCNC: 33.8 G/DL (ref 32–36)
MCV RBC AUTO: 91.9 FL (ref 80–100)
MONOCYTES # BLD AUTO: 524 CELLS/UL (ref 200–950)
MONOCYTES NFR BLD AUTO: 11.4 %
NEUTROPHILS # BLD AUTO: 2406 CELLS/UL (ref 1500–7800)
NEUTROPHILS NFR BLD AUTO: 52.3 %
PLATELET # BLD AUTO: NORMAL THOUSAND/UL
POTASSIUM SERPL-SCNC: 4.4 MMOL/L (ref 3.5–5.3)
PROT SERPL-MCNC: 6.9 G/DL (ref 6.1–8.1)
RBC # BLD AUTO: 4.44 MILLION/UL (ref 4.2–5.8)
SODIUM SERPL-SCNC: 143 MMOL/L (ref 135–146)
URATE SERPL-MCNC: 3.5 MG/DL (ref 4–8)
WBC # BLD AUTO: 4.6 THOUSAND/UL (ref 3.8–10.8)

## 2018-07-06 DIAGNOSIS — M19.90 CHRONIC INFLAMMATORY ARTHRITIS: ICD-10-CM

## 2018-07-06 RX ORDER — HYDROXYCHLOROQUINE SULFATE 200 MG/1
TABLET, FILM COATED ORAL
Qty: 180 TABLET | Refills: 0 | Status: SHIPPED | OUTPATIENT
Start: 2018-07-06 | End: 2018-10-03 | Stop reason: SDUPTHER

## 2018-09-25 ENCOUNTER — OFFICE VISIT (OUTPATIENT)
Dept: RHEUMATOLOGY | Facility: CLINIC | Age: 78
End: 2018-09-25
Payer: MEDICARE

## 2018-09-25 VITALS
SYSTOLIC BLOOD PRESSURE: 128 MMHG | DIASTOLIC BLOOD PRESSURE: 88 MMHG | BODY MASS INDEX: 33.19 KG/M2 | WEIGHT: 251.63 LBS

## 2018-09-25 DIAGNOSIS — M10.9 GOUT, UNSPECIFIED CAUSE, UNSPECIFIED CHRONICITY, UNSPECIFIED SITE: Primary | ICD-10-CM

## 2018-09-25 DIAGNOSIS — Z79.899 ENCOUNTER FOR LONG-TERM (CURRENT) DRUG USE: ICD-10-CM

## 2018-09-25 DIAGNOSIS — M05.79 RHEUMATOID ARTHRITIS INVOLVING MULTIPLE SITES WITH POSITIVE RHEUMATOID FACTOR: ICD-10-CM

## 2018-09-25 PROCEDURE — 3074F SYST BP LT 130 MM HG: CPT | Mod: ,,, | Performed by: INTERNAL MEDICINE

## 2018-09-25 PROCEDURE — 3079F DIAST BP 80-89 MM HG: CPT | Mod: ,,, | Performed by: INTERNAL MEDICINE

## 2018-09-25 PROCEDURE — 99213 OFFICE O/P EST LOW 20 MIN: CPT | Mod: ,,, | Performed by: INTERNAL MEDICINE

## 2018-09-25 RX ORDER — METOPROLOL SUCCINATE 50 MG/1
50 TABLET, EXTENDED RELEASE ORAL DAILY
COMMUNITY
Start: 2018-07-16

## 2018-09-25 RX ORDER — BUMETANIDE 1 MG/1
1 TABLET ORAL EVERY MORNING
COMMUNITY
Start: 2018-07-30

## 2018-09-25 RX ORDER — CHOLECALCIFEROL (VITAMIN D3) 25 MCG
5000 TABLET ORAL DAILY
COMMUNITY
End: 2019-06-25

## 2018-09-25 NOTE — PROGRESS NOTES
[facility] RHEUMATOLOGY           Follow-up visit    Notes dictated via Dragon to EPIC. Please forgive any unintentional errors.  Subjective:       Patient ID:   NAME: Kennedy Thomas : 1940     77 y.o. male    Referring Doc: No ref. provider found  Other Physicians:    Chief Complaint:  Gout (Takes Allopurinol 300mg QD, Plaquenil 200mg BID)      HPI/Interval History:   The patient is doing well. He has had no gout attacks. He notices that he has less pain and stiffness under the balls of his feet. He is quite pleased with that and notes he is now able to walk more easily.          ROS:   GEN:    no fever, night sweats or weight loss  SKIN:   no rashes , erythema, bruising, or swelling, no Raynauds, no photosensitivity  HEENT: no changes in vision, no mouth ulcers, no sicca symptoms, no scalp tenderness, no jaw claudication.  CV:      no CP, PND, PATTERSON or orthopnea, no palpitations  PULM: no SOB, cough, hemoptysis, sputum or pleuritic pain  GI:       no abdominal pain, nausea, vomiting, constipation, diarrhea, melanotic stools, BRBPR, or hematemesis, no dysphagia, no GERD  :     no hematuria, dysuria  NEURO: no paresthesias, headaches, acute visual disturbances  MUSCULOSKELETAL:  No complaints of any red, hot, and/or swollen joints  PSYCH:   No insomnia, no significant anxiety or depression    Medications:    Current Outpatient Medications:     allopurinol (ZYLOPRIM) 300 MG tablet, Take 1 tablet (300 mg total) by mouth once daily., Disp: 90 tablet, Rfl: 3    benazepril (LOTENSIN) 40 MG tablet, Take 40 mg by mouth., Disp: , Rfl:     bumetanide (BUMEX) 1 MG tablet, , Disp: , Rfl:     hydroxychloroquine (PLAQUENIL) 200 mg tablet, TAKE 1 TABLET BY MOUTH TWICE DAILY, Disp: 180 tablet, Rfl: 0    metoprolol succinate (TOPROL-XL) 50 MG 24 hr tablet, , Disp: , Rfl:     potassium chloride (KLOR-CON) 10 MEQ TbSR, Take 10 mEq by mouth once daily., Disp: , Rfl:     terazosin (HYTRIN) 5 MG capsule, Take 5 mg by  mouth., Disp: , Rfl:     vitamin D (VITAMIN D3) 1000 units Tab, Take 5,000 Units by mouth once daily., Disp: , Rfl:     warfarin (COUMADIN) 5 MG tablet, Take 7.5 mg by mouth. , Disp: , Rfl:       FAMILY HISTORY: negative for Connective Tissue Disease        Review of patient's allergies indicates:   Allergen Reactions    Statins-hmg-coa reductase inhibitors              Objective:     Vitals:  Blood pressure 128/88, weight 114.1 kg (251 lb 9.6 oz).    Physical Examination:   GEN: wn/wd male in no apparent distress  SKIN: no rashes, no lesions, no sclerodactyly, no Raynaud's, no periungual erythema  HEAD: no alopecia, no scalp tenderness, no temporal artery tenderness or induration.  EYES: no pallor, no icterus, PERRLA  ENT:  no thrush, no mucosal dryness or ulcerations, adequate oral hygiene & dentition.  NECK: supple x 6, no masses, no thyromegaly, no lymphadenopathy.  CV:   S1 and S2 regular, no murmurs, gallop or rubs  CHEST: Normal respiratory effort;  normal breath sounds/no adventitious sounds. No signs of consolidation.  ABD: non-tender and non-distended; soft; normal bowel sounds; no rebound/guarding or tenderness. No hepatosplenomegaly.  Musculoskeletal:  No evidence of any active inflammatory arthritis. No progressive deformity. No peripheral tophi  EXTREM: no clubbing, cyanosis or edema. normal pulses.  NEURO: grossly intact; motor/sensory WNL; no tremors  PSYCH:  normal mood, affect and behavior            Labs:   Lab Results   Component Value Date    WBC 4.6 05/29/2018    HGB 13.8 05/29/2018    HCT 40.8 05/29/2018    MCV 91.9 05/29/2018    PLT TNP 05/29/2018   CMP@  Sodium   Date Value Ref Range Status   05/29/2018 143 135 - 146 mmol/L Final   05/18/2017 141 134 - 144 mmol/L      Potassium   Date Value Ref Range Status   05/29/2018 4.4 3.5 - 5.3 mmol/L Final     Chloride   Date Value Ref Range Status   05/29/2018 105 98 - 110 mmol/L Final   05/18/2017 103 98 - 110 mmol/L      CO2   Date Value Ref  Range Status   05/29/2018 31 20 - 31 mmol/L Final     Glucose   Date Value Ref Range Status   05/29/2018 97 65 - 99 mg/dL Final     Comment:                   Fasting reference interval        05/18/2017 99 70 - 99 mg/dL      BUN, Bld   Date Value Ref Range Status   05/29/2018 14 7 - 25 mg/dL Final     Creatinine   Date Value Ref Range Status   05/29/2018 0.86 0.70 - 1.18 mg/dL Final     Comment:     For patients >49 years of age, the reference limit  for Creatinine is approximately 13% higher for people  identified as -American.        05/18/2017 1.10 0.60 - 1.40 mg/dL      Calcium   Date Value Ref Range Status   05/29/2018 9.4 8.6 - 10.3 mg/dL Final     Total Protein   Date Value Ref Range Status   05/29/2018 6.9 6.1 - 8.1 g/dL Final     Albumin   Date Value Ref Range Status   05/29/2018 4.5 3.6 - 5.1 g/dL Final   05/18/2017 4.7 3.1 - 4.7 g/dL      Total Bilirubin   Date Value Ref Range Status   05/29/2018 1.4 (H) 0.2 - 1.2 mg/dL Final     Alkaline Phosphatase   Date Value Ref Range Status   05/29/2018 73 40 - 115 U/L Final     AST   Date Value Ref Range Status   05/29/2018 15 10 - 35 U/L Final     ALT   Date Value Ref Range Status   05/29/2018 13 9 - 46 U/L Final     CRP   Date Value Ref Range Status   05/29/2018 2.9 <8.0 mg/L Final     Rheumatoid Factor   Date Value Ref Range Status   01/18/2018 <14 <14 IU/mL Final     Uric Acid   Date Value Ref Range Status   05/29/2018 3.5 (L) 4.0 - 8.0 mg/dL Final     Comment:     Therapeutic target for gout patients: <6.0 mg/dL               Radiology/Diagnostic Studies:    none    Assessment/Discussion/Plan:   77 y.o. male with seronegative rheumatoid arthritis-responding nicely to hydroxychloroquine.  2) Gout quiescent, on allopurinol 300 mg daily    PLAN:  I will continue his medication without change. He has had blood testing from his primary care doctor very recently. We will review that. I have ordered blood testing for his next appointment.      RTC:  I will  see him back in 4 months.      Electronically signed by Piero Veronica MD

## 2018-10-03 DIAGNOSIS — M1A.09X0 IDIOPATHIC CHRONIC GOUT OF MULTIPLE SITES WITHOUT TOPHUS: ICD-10-CM

## 2018-10-03 DIAGNOSIS — M19.90 CHRONIC INFLAMMATORY ARTHRITIS: ICD-10-CM

## 2018-10-04 RX ORDER — ALLOPURINOL 300 MG/1
TABLET ORAL
Qty: 90 TABLET | Refills: 1 | Status: SHIPPED | OUTPATIENT
Start: 2018-10-04 | End: 2019-04-03 | Stop reason: SDUPTHER

## 2018-10-04 RX ORDER — HYDROXYCHLOROQUINE SULFATE 200 MG/1
TABLET, FILM COATED ORAL
Qty: 180 TABLET | Refills: 1 | Status: SHIPPED | OUTPATIENT
Start: 2018-10-04 | End: 2019-04-03 | Stop reason: SDUPTHER

## 2019-02-13 ENCOUNTER — OFFICE VISIT (OUTPATIENT)
Dept: RHEUMATOLOGY | Facility: CLINIC | Age: 79
End: 2019-02-13
Payer: MEDICARE

## 2019-02-13 VITALS
BODY MASS INDEX: 33.29 KG/M2 | WEIGHT: 252.31 LBS | SYSTOLIC BLOOD PRESSURE: 131 MMHG | DIASTOLIC BLOOD PRESSURE: 77 MMHG

## 2019-02-13 DIAGNOSIS — Z79.899 ENCOUNTER FOR LONG-TERM (CURRENT) DRUG USE: ICD-10-CM

## 2019-02-13 DIAGNOSIS — M05.79 RHEUMATOID ARTHRITIS INVOLVING MULTIPLE SITES WITH POSITIVE RHEUMATOID FACTOR: ICD-10-CM

## 2019-02-13 DIAGNOSIS — M10.9 GOUT, UNSPECIFIED CAUSE, UNSPECIFIED CHRONICITY, UNSPECIFIED SITE: Primary | ICD-10-CM

## 2019-02-13 PROCEDURE — 3075F SYST BP GE 130 - 139MM HG: CPT | Mod: ,,, | Performed by: INTERNAL MEDICINE

## 2019-02-13 PROCEDURE — 99213 PR OFFICE/OUTPT VISIT, EST, LEVL III, 20-29 MIN: ICD-10-PCS | Mod: ,,, | Performed by: INTERNAL MEDICINE

## 2019-02-13 PROCEDURE — 3078F DIAST BP <80 MM HG: CPT | Mod: ,,, | Performed by: INTERNAL MEDICINE

## 2019-02-13 PROCEDURE — 1101F PR PT FALLS ASSESS DOC 0-1 FALLS W/OUT INJ PAST YR: ICD-10-PCS | Mod: ,,, | Performed by: INTERNAL MEDICINE

## 2019-02-13 PROCEDURE — 1101F PT FALLS ASSESS-DOCD LE1/YR: CPT | Mod: ,,, | Performed by: INTERNAL MEDICINE

## 2019-02-13 PROCEDURE — 3075F PR MOST RECENT SYSTOLIC BLOOD PRESS GE 130-139MM HG: ICD-10-PCS | Mod: ,,, | Performed by: INTERNAL MEDICINE

## 2019-02-13 PROCEDURE — 3078F PR MOST RECENT DIASTOLIC BLOOD PRESSURE < 80 MM HG: ICD-10-PCS | Mod: ,,, | Performed by: INTERNAL MEDICINE

## 2019-02-13 PROCEDURE — 99213 OFFICE O/P EST LOW 20 MIN: CPT | Mod: ,,, | Performed by: INTERNAL MEDICINE

## 2019-02-13 NOTE — PROGRESS NOTES
[facility] RHEUMATOLOGY           Follow-up visit    Notes dictated via Dragon to EPIC. Please forgive any unintended errors.  Subjective:       Patient ID:   NAME: Kennedy Thomas : 1940     78 y.o. male    Referring Doc: No ref. provider found  Other Physicians:    Chief Complaint:  Gout      HPI/Interval History:   The patient is doing just fine. He has had no episodes of gout since his prior visit. He has not noted any red, hot, and/or swollen joints. He has no morning stiffness.          ROS:   GEN:    no fever, night sweats or weight loss  SKIN:   no rashes , erythema, bruising, or swelling, no Raynauds, no photosensitivity  HEENT: no changes in vision, no mouth ulcers, no sicca symptoms, no scalp tenderness, no jaw claudication.  CV:      no CP, PND, PATTERSON or orthopnea, no palpitations  PULM: no SOB, cough, hemoptysis, sputum or pleuritic pain  GI:       no GERD, no dysphagia, no abdominal pain, nausea, vomiting, constipation, diarrhea, melanotic stools, BRBPR, or hematemesis  :      no hematuria, dysuria  NEURO: no paresthesias, headaches, acute visual disturbances  MUSCULOSKELETAL:  No muscle or joint complaints  PSYCH:   No insomnia, no significant anxiety or depression    Medications:    Current Outpatient Medications:     allopurinol (ZYLOPRIM) 300 MG tablet, TAKE 1 TABLET(300 MG) BY MOUTH EVERY DAY, Disp: 90 tablet, Rfl: 1    benazepril (LOTENSIN) 40 MG tablet, Take 40 mg by mouth., Disp: , Rfl:     bumetanide (BUMEX) 1 MG tablet, , Disp: , Rfl:     hydroxychloroquine (PLAQUENIL) 200 mg tablet, TAKE 1 TABLET BY MOUTH TWICE DAILY, Disp: 180 tablet, Rfl: 1    metoprolol succinate (TOPROL-XL) 50 MG 24 hr tablet, , Disp: , Rfl:     potassium chloride (KLOR-CON) 10 MEQ TbSR, Take 10 mEq by mouth once daily., Disp: , Rfl:     terazosin (HYTRIN) 5 MG capsule, Take 5 mg by mouth., Disp: , Rfl:     vitamin D (VITAMIN D3) 1000 units Tab, Take 5,000 Units by mouth once daily., Disp: , Rfl:      warfarin (COUMADIN) 5 MG tablet, Take 7.5 mg by mouth. , Disp: , Rfl:       FAMILY HISTORY: negative for Connective Tissue Disease        Review of patient's allergies indicates:   Allergen Reactions    Statins-hmg-coa reductase inhibitors              Objective:     Vitals:  Blood pressure 131/77, weight 114.4 kg (252 lb 4.8 oz).    Physical Examination:   GEN: wn/wd male in no apparent distress  SKIN: no rashes, no sclerodactyly, no Raynaud's, no periungual erythema, no digital tip ulcerations, no nailbed pitting  HEAD: no alopecia, no scalp tenderness, no temporal artery tenderness or induration.  EYES: no pallor, no icterus, PERRLA  ENT:  no thrush, no mucosal dryness or ulcerations, adequate oral hygiene & dentition.  NECK: supple x 6, no masses, no thyromegaly, no lymphadenopathy.  CV:   S1 and S2 regular, no murmurs, gallop or rubs  CHEST: Normal respiratory effort;  normal breath sounds/no adventitious sounds. No signs of consolidation.  ABD: non-tender and non-distended; soft; normal bowel sounds; no rebound/guarding or tenderness. No hepatosplenomegaly.  Musculoskeletal:  No evidence of active inflammatory arthritis. No peripheral tophi.  EXTREM: no clubbing, cyanosis or edema. normal pulses.  NEURO:  grossly intact; motor/sensory WNL; no tremors  PSYCH:  normal mood, affect and behavior            Labs:   Lab Results   Component Value Date    WBC 4.6 05/29/2018    HGB 13.8 05/29/2018    HCT 40.8 05/29/2018    MCV 91.9 05/29/2018    PLT TNP 05/29/2018   CMP@  Sodium   Date Value Ref Range Status   05/29/2018 143 135 - 146 mmol/L Final   05/18/2017 141 134 - 144 mmol/L      Potassium   Date Value Ref Range Status   05/29/2018 4.4 3.5 - 5.3 mmol/L Final     Chloride   Date Value Ref Range Status   05/29/2018 105 98 - 110 mmol/L Final   05/18/2017 103 98 - 110 mmol/L      CO2   Date Value Ref Range Status   05/29/2018 31 20 - 31 mmol/L Final     Glucose   Date Value Ref Range Status   05/29/2018 97 65 - 99  mg/dL Final     Comment:                   Fasting reference interval        05/18/2017 99 70 - 99 mg/dL      BUN, Bld   Date Value Ref Range Status   05/29/2018 14 7 - 25 mg/dL Final     Creatinine   Date Value Ref Range Status   05/29/2018 0.86 0.70 - 1.18 mg/dL Final     Comment:     For patients >49 years of age, the reference limit  for Creatinine is approximately 13% higher for people  identified as -American.        05/18/2017 1.10 0.60 - 1.40 mg/dL      Calcium   Date Value Ref Range Status   05/29/2018 9.4 8.6 - 10.3 mg/dL Final     Total Protein   Date Value Ref Range Status   05/29/2018 6.9 6.1 - 8.1 g/dL Final     Albumin   Date Value Ref Range Status   05/29/2018 4.5 3.6 - 5.1 g/dL Final   05/18/2017 4.7 3.1 - 4.7 g/dL      Total Bilirubin   Date Value Ref Range Status   05/29/2018 1.4 (H) 0.2 - 1.2 mg/dL Final     Alkaline Phosphatase   Date Value Ref Range Status   05/29/2018 73 40 - 115 U/L Final     AST   Date Value Ref Range Status   05/29/2018 15 10 - 35 U/L Final     ALT   Date Value Ref Range Status   05/29/2018 13 9 - 46 U/L Final     CRP   Date Value Ref Range Status   05/29/2018 2.9 <8.0 mg/L Final     Rheumatoid Factor   Date Value Ref Range Status   01/18/2018 <14 <14 IU/mL Final     Uric Acid   Date Value Ref Range Status   05/29/2018 3.5 (L) 4.0 - 8.0 mg/dL Final     Comment:     Therapeutic target for gout patients: <6.0 mg/dL               Radiology/Diagnostic Studies:    none    Assessment/Discussion/Plan:   78 y.o. male with low-grade chronic inflammatory arthritis-excellent control with hydroxychloroquine 400 mg daily  2) gout-good control on allopurinol 300 mg daily    PLAN: I will continue his medication without change. Blood testing was performed today. I will consider decreasing his hydroxychloroquine to once daily.      RTC: I will see him back in 4 months      Electronically signed by Piero Veronica MD

## 2019-02-22 LAB
ALBUMIN SERPL-MCNC: 4.2 G/DL (ref 3.6–5.1)
ALBUMIN/GLOB SERPL: 1.8 (CALC) (ref 1–2.5)
ALP SERPL-CCNC: 67 U/L (ref 40–115)
ALT SERPL-CCNC: 18 U/L (ref 9–46)
AST SERPL-CCNC: 18 U/L (ref 10–35)
BASOPHILS # BLD AUTO: 60 CELLS/UL (ref 0–200)
BASOPHILS NFR BLD AUTO: 1.2 %
BILIRUB SERPL-MCNC: 1.1 MG/DL (ref 0.2–1.2)
BUN SERPL-MCNC: 17 MG/DL (ref 7–25)
BUN/CREAT SERPL: ABNORMAL (CALC) (ref 6–22)
CALCIUM SERPL-MCNC: 9.3 MG/DL (ref 8.6–10.3)
CHLORIDE SERPL-SCNC: 102 MMOL/L (ref 98–110)
CO2 SERPL-SCNC: 33 MMOL/L (ref 20–32)
CREAT SERPL-MCNC: 1.06 MG/DL (ref 0.7–1.18)
CRP SERPL-MCNC: 2.4 MG/L
EOSINOPHIL # BLD AUTO: 160 CELLS/UL (ref 15–500)
EOSINOPHIL NFR BLD AUTO: 3.2 %
ERYTHROCYTE [DISTWIDTH] IN BLOOD BY AUTOMATED COUNT: 13.4 % (ref 11–15)
GFRSERPLBLD MDRD-ARVRAT: 68 ML/MIN/1.73M2
GLOBULIN SER CALC-MCNC: 2.4 G/DL (CALC) (ref 1.9–3.7)
GLUCOSE SERPL-MCNC: 115 MG/DL (ref 65–139)
HCT VFR BLD AUTO: 39.8 % (ref 38.5–50)
HGB BLD-MCNC: 13.6 G/DL (ref 13.2–17.1)
LYMPHOCYTES # BLD AUTO: 1450 CELLS/UL (ref 850–3900)
LYMPHOCYTES NFR BLD AUTO: 29 %
MCH RBC QN AUTO: 32 PG (ref 27–33)
MCHC RBC AUTO-ENTMCNC: 34.2 G/DL (ref 32–36)
MCV RBC AUTO: 93.6 FL (ref 80–100)
MONOCYTES # BLD AUTO: 470 CELLS/UL (ref 200–950)
MONOCYTES NFR BLD AUTO: 9.4 %
NEUTROPHILS # BLD AUTO: 2860 CELLS/UL (ref 1500–7800)
NEUTROPHILS NFR BLD AUTO: 57.2 %
PLATELET # BLD AUTO: 118 THOUSAND/UL (ref 140–400)
PMV BLD REES-ECKER: 11.4 FL (ref 7.5–12.5)
POTASSIUM SERPL-SCNC: 4 MMOL/L (ref 3.5–5.3)
PROT SERPL-MCNC: 6.6 G/DL (ref 6.1–8.1)
RBC # BLD AUTO: 4.25 MILLION/UL (ref 4.2–5.8)
SERVICE CMNT-IMP: ABNORMAL
SODIUM SERPL-SCNC: 142 MMOL/L (ref 135–146)
URATE SERPL-MCNC: 4.3 MG/DL (ref 4–8)
WBC # BLD AUTO: 5 THOUSAND/UL (ref 3.8–10.8)

## 2019-04-03 DIAGNOSIS — M1A.09X0 IDIOPATHIC CHRONIC GOUT OF MULTIPLE SITES WITHOUT TOPHUS: ICD-10-CM

## 2019-04-03 DIAGNOSIS — M19.90 CHRONIC INFLAMMATORY ARTHRITIS: ICD-10-CM

## 2019-04-03 RX ORDER — ALLOPURINOL 300 MG/1
TABLET ORAL
Qty: 90 TABLET | Refills: 1 | Status: SHIPPED | OUTPATIENT
Start: 2019-04-03 | End: 2019-09-28 | Stop reason: SDUPTHER

## 2019-04-03 RX ORDER — HYDROXYCHLOROQUINE SULFATE 200 MG/1
TABLET, FILM COATED ORAL
Qty: 180 TABLET | Refills: 1 | Status: SHIPPED | OUTPATIENT
Start: 2019-04-03 | End: 2019-09-28 | Stop reason: SDUPTHER

## 2019-06-25 ENCOUNTER — OFFICE VISIT (OUTPATIENT)
Dept: RHEUMATOLOGY | Facility: CLINIC | Age: 79
End: 2019-06-25
Payer: MEDICARE

## 2019-06-25 VITALS
SYSTOLIC BLOOD PRESSURE: 137 MMHG | BODY MASS INDEX: 31.97 KG/M2 | DIASTOLIC BLOOD PRESSURE: 78 MMHG | WEIGHT: 242.31 LBS

## 2019-06-25 DIAGNOSIS — M10.9 GOUT, UNSPECIFIED CAUSE, UNSPECIFIED CHRONICITY, UNSPECIFIED SITE: Primary | ICD-10-CM

## 2019-06-25 DIAGNOSIS — M05.79 RHEUMATOID ARTHRITIS INVOLVING MULTIPLE SITES WITH POSITIVE RHEUMATOID FACTOR: ICD-10-CM

## 2019-06-25 DIAGNOSIS — Z79.899 ENCOUNTER FOR LONG-TERM (CURRENT) DRUG USE: ICD-10-CM

## 2019-06-25 PROCEDURE — 3075F PR MOST RECENT SYSTOLIC BLOOD PRESS GE 130-139MM HG: ICD-10-PCS | Mod: ,,, | Performed by: INTERNAL MEDICINE

## 2019-06-25 PROCEDURE — 3075F SYST BP GE 130 - 139MM HG: CPT | Mod: ,,, | Performed by: INTERNAL MEDICINE

## 2019-06-25 PROCEDURE — 3078F PR MOST RECENT DIASTOLIC BLOOD PRESSURE < 80 MM HG: ICD-10-PCS | Mod: ,,, | Performed by: INTERNAL MEDICINE

## 2019-06-25 PROCEDURE — 99213 OFFICE O/P EST LOW 20 MIN: CPT | Mod: ,,, | Performed by: INTERNAL MEDICINE

## 2019-06-25 PROCEDURE — 3078F DIAST BP <80 MM HG: CPT | Mod: ,,, | Performed by: INTERNAL MEDICINE

## 2019-06-25 PROCEDURE — 1101F PR PT FALLS ASSESS DOC 0-1 FALLS W/OUT INJ PAST YR: ICD-10-PCS | Mod: ,,, | Performed by: INTERNAL MEDICINE

## 2019-06-25 PROCEDURE — 1101F PT FALLS ASSESS-DOCD LE1/YR: CPT | Mod: ,,, | Performed by: INTERNAL MEDICINE

## 2019-06-25 PROCEDURE — 99213 PR OFFICE/OUTPT VISIT, EST, LEVL III, 20-29 MIN: ICD-10-PCS | Mod: ,,, | Performed by: INTERNAL MEDICINE

## 2019-06-25 RX ORDER — BUDESONIDE AND FORMOTEROL FUMARATE DIHYDRATE 160; 4.5 UG/1; UG/1
1 AEROSOL RESPIRATORY (INHALATION)
Refills: 0 | COMMUNITY
Start: 2019-03-27

## 2019-06-25 RX ORDER — FLUTICASONE PROPIONATE 50 MCG
2 SPRAY, SUSPENSION (ML) NASAL
COMMUNITY
Start: 2019-04-09 | End: 2019-10-06

## 2019-06-25 NOTE — PROGRESS NOTES
[facility] RHEUMATOLOGY           Follow-up visit    Notes dictated via Dragon to EPIC. Please forgive any unintended errors.  Subjective:       Patient ID:   NAME: Kennedy Thomas : 1940     78 y.o. male    Referring Doc: No ref. provider found  Other Physicians:    Chief Complaint:  Gout                                  Rheumatoid Arthritis    HPI/Interval History:   The patient is doing well. He has had no episodes of gout since his last visit.  He has not had any complaints of red, hot, and/or swollen joints.          ROS:   GEN:    no fever, night sweats or weight loss  SKIN:   no rashes , erythema, bruising, or swelling, no Raynauds, no photosensitivity  HEENT: no changes in vision, no mouth ulcers, no sicca symptoms, no scalp tenderness, no jaw claudication.  CV:      no CP, PND, PATTERSON or orthopnea, no palpitations  PULM: no SOB, cough, hemoptysis, sputum or pleuritic pain  GI:       no GERD, no dysphagia, no abdominal pain, nausea, vomiting, constipation, diarrhea, melanotic stools, BRBPR, or hematemesis  :      no hematuria, dysuria  NEURO: no paresthesias, headaches, acute visual disturbances  MUSCULOSKELETAL:  No muscle or joint pain  PSYCH:   No insomnia, no increased anxiety or depression    Past Medical/Surgical History:  Past Medical History:   Diagnosis Date    Arthritis     Gout     Heart block     Hypertension     Pancreatitis      Past Surgical History:   Procedure Laterality Date    APPENDECTOMY      CARDIAC PACEMAKER PLACEMENT         Allergies:  Review of patient's allergies indicates:   Allergen Reactions    Statins-hmg-coa reductase inhibitors        Social/Family History:  Social History     Socioeconomic History    Marital status:      Spouse name: Not on file    Number of children: Not on file    Years of education: Not on file    Highest education level: Not on file   Occupational History    Not on file   Social Needs    Financial resource strain: Not on file     Food insecurity:     Worry: Not on file     Inability: Not on file    Transportation needs:     Medical: Not on file     Non-medical: Not on file   Tobacco Use    Smoking status: Former Smoker    Smokeless tobacco: Never Used   Substance and Sexual Activity    Alcohol use: No    Drug use: No    Sexual activity: Not on file   Lifestyle    Physical activity:     Days per week: Not on file     Minutes per session: Not on file    Stress: Not on file   Relationships    Social connections:     Talks on phone: Not on file     Gets together: Not on file     Attends Oriental orthodox service: Not on file     Active member of club or organization: Not on file     Attends meetings of clubs or organizations: Not on file     Relationship status: Not on file   Other Topics Concern    Not on file   Social History Narrative    Not on file     Family History   Problem Relation Age of Onset    Diabetes Mellitus Mother      FAMILY HISTORY: negative for Connective Tissue Disease      Medications:    Current Outpatient Medications:     allopurinol (ZYLOPRIM) 300 MG tablet, TAKE 1 TABLET(300 MG) BY MOUTH EVERY DAY, Disp: 90 tablet, Rfl: 1    benazepril (LOTENSIN) 40 MG tablet, Take 40 mg by mouth., Disp: , Rfl:     bumetanide (BUMEX) 1 MG tablet, , Disp: , Rfl:     fluticasone propionate (FLONASE) 50 mcg/actuation nasal spray, 2 sprays by Nasal route., Disp: , Rfl:     hydroxychloroquine (PLAQUENIL) 200 mg tablet, TAKE 1 TABLET BY MOUTH TWICE DAILY, Disp: 180 tablet, Rfl: 1    metoprolol succinate (TOPROL-XL) 50 MG 24 hr tablet, , Disp: , Rfl:     potassium chloride (KLOR-CON) 10 MEQ TbSR, Take 10 mEq by mouth once daily., Disp: , Rfl:     SYMBICORT 160-4.5 mcg/actuation HFAA, INL 2 PFS ITL BID, Disp: , Rfl: 0    terazosin (HYTRIN) 5 MG capsule, Take 5 mg by mouth., Disp: , Rfl:     warfarin (COUMADIN) 5 MG tablet, Take 7.5 mg by mouth. , Disp: , Rfl:         Objective:     Vitals:  Blood pressure 137/78, weight 109.9  kg (242 lb 4.8 oz).    Physical Examination:   GEN: wn/wd male in no apparent distress  SKIN: no rashes, no sclerodactyly, no Raynaud's, no periungual erythema, no digital tip ulcerations, no nailbed pitting  HEAD: no alopecia, no scalp tenderness, no temporal artery tenderness or induration.  EYES: no pallor, no icterus, PERRLA  ENT:  no thrush, no mucosal dryness or ulcerations, adequate oral hygiene & dentition.  NECK: supple x 6, no masses, no thyromegaly, no lymphadenopathy.  CV:   S1 and S2 regular, no murmurs, gallop or rubs  CHEST: Normal respiratory effort;  normal breath sounds/no adventitious sounds. No signs of consolidation.  ABD: non-tender and non-distended; soft; normal bowel sounds; no rebound/guarding or tenderness. No hepatosplenomegaly.  Musculoskeletal:  No evidence of active inflammatory arthritis. No progressive deformity  EXTREM: no clubbing, cyanosis or edema. normal pulses.  NEURO:  grossly intact; motor/sensory WNL; no tremors  PSYCH:  normal mood, affect and behavior            Labs:   Lab Results   Component Value Date    WBC 5.0 02/21/2019    HGB 13.6 02/21/2019    HCT 39.8 02/21/2019    MCV 93.6 02/21/2019     (L) 02/21/2019   CMP@  Sodium   Date Value Ref Range Status   02/21/2019 142 135 - 146 mmol/L Final   05/18/2017 141 134 - 144 mmol/L      Potassium   Date Value Ref Range Status   02/21/2019 4.0 3.5 - 5.3 mmol/L Final     Chloride   Date Value Ref Range Status   02/21/2019 102 98 - 110 mmol/L Final   05/18/2017 103 98 - 110 mmol/L      CO2   Date Value Ref Range Status   02/21/2019 33 (H) 20 - 32 mmol/L Final     Glucose   Date Value Ref Range Status   02/21/2019 115 65 - 139 mg/dL Final     Comment:               Non-fasting reference interval        05/18/2017 99 70 - 99 mg/dL      BUN, Bld   Date Value Ref Range Status   02/21/2019 17 7 - 25 mg/dL Final     Creatinine   Date Value Ref Range Status   02/21/2019 1.06 0.70 - 1.18 mg/dL Final     Comment:     For patients  >49 years of age, the reference limit  for Creatinine is approximately 13% higher for people  identified as -American.        05/18/2017 1.10 0.60 - 1.40 mg/dL      Calcium   Date Value Ref Range Status   02/21/2019 9.3 8.6 - 10.3 mg/dL Final     Total Protein   Date Value Ref Range Status   02/21/2019 6.6 6.1 - 8.1 g/dL Final     Albumin   Date Value Ref Range Status   02/21/2019 4.2 3.6 - 5.1 g/dL Final   05/18/2017 4.7 3.1 - 4.7 g/dL      Total Bilirubin   Date Value Ref Range Status   02/21/2019 1.1 0.2 - 1.2 mg/dL Final     Alkaline Phosphatase   Date Value Ref Range Status   02/21/2019 67 40 - 115 U/L Final     AST   Date Value Ref Range Status   02/21/2019 18 10 - 35 U/L Final     ALT   Date Value Ref Range Status   02/21/2019 18 9 - 46 U/L Final     CRP   Date Value Ref Range Status   02/21/2019 2.4 <8.0 mg/L Final     Rheumatoid Factor   Date Value Ref Range Status   01/18/2018 <14 <14 IU/mL Final     Uric Acid   Date Value Ref Range Status   02/21/2019 4.3 4.0 - 8.0 mg/dL Final     Comment:     Therapeutic target for gout patients: <6.0 mg/dL               Radiology/Diagnostic Studies:    None    Assessment/Discussion/Plan:   78 y.o. male with chronic gouty arthritis-good control on allopurinol 300 mg daily  2) seronegative chronic inflammatory arthritis-excellent control on hydroxychloroquine 400 mg daily    PLAN:  I will continue his medication unchanged. Routine blood testing was obtained.      RTC:  I will see him back in 4 months.      Electronically signed by Piero Veronica MD

## 2019-06-26 LAB
ALBUMIN SERPL-MCNC: 3.9 G/DL (ref 3.6–5.1)
ALBUMIN/GLOB SERPL: 1.6 (CALC) (ref 1–2.5)
ALP SERPL-CCNC: 67 U/L (ref 40–115)
ALT SERPL-CCNC: 13 U/L (ref 9–46)
AST SERPL-CCNC: 15 U/L (ref 10–35)
BASOPHILS # BLD AUTO: 51 CELLS/UL (ref 0–200)
BASOPHILS NFR BLD AUTO: 1 %
BILIRUB SERPL-MCNC: 1.1 MG/DL (ref 0.2–1.2)
BUN SERPL-MCNC: 16 MG/DL (ref 7–25)
BUN/CREAT SERPL: 13 (CALC) (ref 6–22)
CALCIUM SERPL-MCNC: 9 MG/DL (ref 8.6–10.3)
CHLORIDE SERPL-SCNC: 106 MMOL/L (ref 98–110)
CO2 SERPL-SCNC: 31 MMOL/L (ref 20–32)
CREAT SERPL-MCNC: 1.23 MG/DL (ref 0.7–1.18)
CRP SERPL-MCNC: 3.5 MG/L
EOSINOPHIL # BLD AUTO: 128 CELLS/UL (ref 15–500)
EOSINOPHIL NFR BLD AUTO: 2.5 %
ERYTHROCYTE [DISTWIDTH] IN BLOOD BY AUTOMATED COUNT: 13.2 % (ref 11–15)
GFRSERPLBLD MDRD-ARVRAT: 57 ML/MIN/1.73M2
GLOBULIN SER CALC-MCNC: 2.4 G/DL (CALC) (ref 1.9–3.7)
GLUCOSE SERPL-MCNC: 90 MG/DL (ref 65–99)
HCT VFR BLD AUTO: 35.1 % (ref 38.5–50)
HGB BLD-MCNC: 12 G/DL (ref 13.2–17.1)
LYMPHOCYTES # BLD AUTO: 1367 CELLS/UL (ref 850–3900)
LYMPHOCYTES NFR BLD AUTO: 26.8 %
MCH RBC QN AUTO: 31.4 PG (ref 27–33)
MCHC RBC AUTO-ENTMCNC: 34.2 G/DL (ref 32–36)
MCV RBC AUTO: 91.9 FL (ref 80–100)
MONOCYTES # BLD AUTO: 576 CELLS/UL (ref 200–950)
MONOCYTES NFR BLD AUTO: 11.3 %
NEUTROPHILS # BLD AUTO: 2978 CELLS/UL (ref 1500–7800)
NEUTROPHILS NFR BLD AUTO: 58.4 %
PLATELET # BLD AUTO: 116 THOUSAND/UL (ref 140–400)
PLATELET BLD QL SMEAR: ABNORMAL
PMV BLD REES-ECKER: 11.6 FL (ref 7.5–12.5)
POTASSIUM SERPL-SCNC: 4.5 MMOL/L (ref 3.5–5.3)
PROT SERPL-MCNC: 6.3 G/DL (ref 6.1–8.1)
RBC # BLD AUTO: 3.82 MILLION/UL (ref 4.2–5.8)
SODIUM SERPL-SCNC: 143 MMOL/L (ref 135–146)
URATE SERPL-MCNC: 3.7 MG/DL (ref 4–8)
WBC # BLD AUTO: 5.1 THOUSAND/UL (ref 3.8–10.8)

## 2019-09-28 DIAGNOSIS — M1A.09X0 IDIOPATHIC CHRONIC GOUT OF MULTIPLE SITES WITHOUT TOPHUS: ICD-10-CM

## 2019-09-28 DIAGNOSIS — M19.90 CHRONIC INFLAMMATORY ARTHRITIS: ICD-10-CM

## 2019-09-28 RX ORDER — ALLOPURINOL 300 MG/1
TABLET ORAL
Qty: 90 TABLET | Refills: 0 | Status: SHIPPED | OUTPATIENT
Start: 2019-09-28 | End: 2019-12-26

## 2019-09-28 RX ORDER — HYDROXYCHLOROQUINE SULFATE 200 MG/1
TABLET, FILM COATED ORAL
Qty: 180 TABLET | Refills: 0 | Status: SHIPPED | OUTPATIENT
Start: 2019-09-28 | End: 2019-12-26

## 2019-10-29 ENCOUNTER — OFFICE VISIT (OUTPATIENT)
Dept: RHEUMATOLOGY | Facility: CLINIC | Age: 79
End: 2019-10-29
Payer: MEDICARE

## 2019-10-29 VITALS
BODY MASS INDEX: 32.18 KG/M2 | SYSTOLIC BLOOD PRESSURE: 138 MMHG | DIASTOLIC BLOOD PRESSURE: 85 MMHG | WEIGHT: 243.88 LBS

## 2019-10-29 DIAGNOSIS — M05.79 RHEUMATOID ARTHRITIS INVOLVING MULTIPLE SITES WITH POSITIVE RHEUMATOID FACTOR: ICD-10-CM

## 2019-10-29 DIAGNOSIS — M10.9 GOUT, UNSPECIFIED CAUSE, UNSPECIFIED CHRONICITY, UNSPECIFIED SITE: Primary | ICD-10-CM

## 2019-10-29 DIAGNOSIS — Z79.899 ENCOUNTER FOR LONG-TERM (CURRENT) DRUG USE: ICD-10-CM

## 2019-10-29 PROCEDURE — 99213 PR OFFICE/OUTPT VISIT, EST, LEVL III, 20-29 MIN: ICD-10-PCS | Mod: S$GLB,,, | Performed by: INTERNAL MEDICINE

## 2019-10-29 PROCEDURE — 3079F DIAST BP 80-89 MM HG: CPT | Mod: S$GLB,,, | Performed by: INTERNAL MEDICINE

## 2019-10-29 PROCEDURE — 3075F PR MOST RECENT SYSTOLIC BLOOD PRESS GE 130-139MM HG: ICD-10-PCS | Mod: S$GLB,,, | Performed by: INTERNAL MEDICINE

## 2019-10-29 PROCEDURE — 1101F PT FALLS ASSESS-DOCD LE1/YR: CPT | Mod: S$GLB,,, | Performed by: INTERNAL MEDICINE

## 2019-10-29 PROCEDURE — 3079F PR MOST RECENT DIASTOLIC BLOOD PRESSURE 80-89 MM HG: ICD-10-PCS | Mod: S$GLB,,, | Performed by: INTERNAL MEDICINE

## 2019-10-29 PROCEDURE — 3075F SYST BP GE 130 - 139MM HG: CPT | Mod: S$GLB,,, | Performed by: INTERNAL MEDICINE

## 2019-10-29 PROCEDURE — 1101F PR PT FALLS ASSESS DOC 0-1 FALLS W/OUT INJ PAST YR: ICD-10-PCS | Mod: S$GLB,,, | Performed by: INTERNAL MEDICINE

## 2019-10-29 PROCEDURE — 99213 OFFICE O/P EST LOW 20 MIN: CPT | Mod: S$GLB,,, | Performed by: INTERNAL MEDICINE

## 2019-10-29 NOTE — PROGRESS NOTES
[facility] RHEUMATOLOGY           Follow-up visit    Notes dictated to M*Modal. Please forgive any unintended errors.  Subjective:       Patient ID:   NAME: Kennedy Thomas : 1940     78 y.o. male    Referring Doc: No ref. provider found  Other Physicians:    Chief Complaint:  Gout      HPI/Interval History:  The patient is doing well.  He has no complaints of any red, hot, and/or swollen joints.  He has no morning stiffness.  He has not had any attacks of gout.    ROS:   GEN:    no fever, night sweats or weight loss  SKIN:   no rashes, erythema, bruising, or swelling, no Raynauds, no photosensitivity  HEENT: no changes in vision, no mouth ulcers, no sicca symptoms, no scalp tenderness, no jaw claudication.  CV:      no CP, PND, PATTERSON, orthopnea, no palpitations  PULM: no SOB, cough, hemoptysis, sputum or pleuritic pain  GI:       no GERD, no dysphagia, no hematemesis, no abdominal pain, nausea, vomiting, constipation, diarrhea, melanotic stools, BRBPR  :      no hematuria, dysuria  NEURO: no paresthesias, headaches, acute visual disturbances  MUSCULOSKELETAL:  No complaints of muscle or joint pain  PSYCH:   No increased insomnia, no increased anxiety, no increased depression    Past Medical/Surgical History:  Past Medical History:   Diagnosis Date    Arthritis     Gout     Heart block     Hypertension     Pancreatitis      Past Surgical History:   Procedure Laterality Date    APPENDECTOMY      CARDIAC PACEMAKER PLACEMENT         Allergies:  Review of patient's allergies indicates:   Allergen Reactions    Statins-hmg-coa reductase inhibitors        Social/Family History:  Social History     Socioeconomic History    Marital status:      Spouse name: Not on file    Number of children: Not on file    Years of education: Not on file    Highest education level: Not on file   Occupational History    Not on file   Social Needs    Financial resource strain: Not on file    Food insecurity:      Worry: Not on file     Inability: Not on file    Transportation needs:     Medical: Not on file     Non-medical: Not on file   Tobacco Use    Smoking status: Former Smoker    Smokeless tobacco: Never Used   Substance and Sexual Activity    Alcohol use: No    Drug use: No    Sexual activity: Not on file   Lifestyle    Physical activity:     Days per week: Not on file     Minutes per session: Not on file    Stress: Not on file   Relationships    Social connections:     Talks on phone: Not on file     Gets together: Not on file     Attends [employer] service: Not on file     Active member of club or organization: Not on file     Attends meetings of clubs or organizations: Not on file     Relationship status: Not on file   Other Topics Concern    Not on file   Social History Narrative    Not on file     Family History   Problem Relation Age of Onset    Diabetes Mellitus Mother      FAMILY HISTORY: negative for Connective Tissue Disease      Medications:    Current Outpatient Medications:     allopurinol (ZYLOPRIM) 300 MG tablet, TAKE 1 TABLET(300 MG) BY MOUTH EVERY DAY, Disp: 90 tablet, Rfl: 0    benazepril (LOTENSIN) 40 MG tablet, Take 40 mg by mouth., Disp: , Rfl:     bumetanide (BUMEX) 1 MG tablet, , Disp: , Rfl:     hydroxychloroquine (PLAQUENIL) 200 mg tablet, TAKE 1 TABLET BY MOUTH TWICE DAILY, Disp: 180 tablet, Rfl: 0    metoprolol succinate (TOPROL-XL) 50 MG 24 hr tablet, , Disp: , Rfl:     potassium chloride (KLOR-CON) 10 MEQ TbSR, Take 10 mEq by mouth once daily., Disp: , Rfl:     SYMBICORT 160-4.5 mcg/actuation HFAA, INL 2 PFS ITL BID, Disp: , Rfl: 0    terazosin (HYTRIN) 5 MG capsule, Take 5 mg by mouth., Disp: , Rfl:     warfarin (COUMADIN) 5 MG tablet, Take 7.5 mg by mouth. , Disp: , Rfl:       Objective:     Vitals:  Blood pressure 138/85, weight 110.6 kg (243 lb 14.4 oz).    Physical Examination:   GEN: wn/wd male in no apparent distress  SKIN: no rashes, no sclerodactyly, no  Raynaud's, no periungual erythema, no digital tip ulcerations, no nailbed pitting  HEAD: no alopecia, no scalp tenderness, no temporal artery tenderness or induration.  EYES: no pallor, no icterus, PERRLA  ENT:  no thrush, no mucosal dryness or ulcerations, adequate oral hygiene & dentition.  NECK: supple x 6, no masses, no thyromegaly, no lymphadenopathy.  CV:   S1 and S2 regular, no murmurs, gallop or rubs  CHEST: Normal respiratory effort;  normal breath sounds/no adventitious sounds. No signs of consolidation.  ABD: non-tender and non-distended; soft; normal bowel sounds; no rebound/guarding or tenderness. No hepatosplenomegaly.  Musculoskeletal:  No evidence of inflammatory arthritis.  No progressive deformity.  No peripheral tophi  EXTREM: no clubbing, cyanosis or edema. normal pulses.  NEURO:  grossly intact; motor/sensory WNL; no tremors  PSYCH:  normal mood, affect and behavior    Labs:   Lab Results   Component Value Date    WBC 5.1 06/25/2019    HGB 12.0 (L) 06/25/2019    HCT 35.1 (L) 06/25/2019    MCV 91.9 06/25/2019     (L) 06/25/2019   CMP@  Sodium   Date Value Ref Range Status   06/25/2019 143 135 - 146 mmol/L Final   05/18/2017 141 134 - 144 mmol/L      Potassium   Date Value Ref Range Status   06/25/2019 4.5 3.5 - 5.3 mmol/L Final     Chloride   Date Value Ref Range Status   06/25/2019 106 98 - 110 mmol/L Final   05/18/2017 103 98 - 110 mmol/L      CO2   Date Value Ref Range Status   06/25/2019 31 20 - 32 mmol/L Final     Glucose   Date Value Ref Range Status   06/25/2019 90 65 - 99 mg/dL Final     Comment:                   Fasting reference interval        05/18/2017 99 70 - 99 mg/dL      BUN, Bld   Date Value Ref Range Status   06/25/2019 16 7 - 25 mg/dL Final     Creatinine   Date Value Ref Range Status   06/25/2019 1.23 (H) 0.70 - 1.18 mg/dL Final     Comment:     For patients >49 years of age, the reference limit  for Creatinine is approximately 13% higher for people  identified as  -American.        05/18/2017 1.10 0.60 - 1.40 mg/dL      Calcium   Date Value Ref Range Status   06/25/2019 9.0 8.6 - 10.3 mg/dL Final     Total Protein   Date Value Ref Range Status   06/25/2019 6.3 6.1 - 8.1 g/dL Final     Albumin   Date Value Ref Range Status   06/25/2019 3.9 3.6 - 5.1 g/dL Final   05/18/2017 4.7 3.1 - 4.7 g/dL      Total Bilirubin   Date Value Ref Range Status   06/25/2019 1.1 0.2 - 1.2 mg/dL Final     Alkaline Phosphatase   Date Value Ref Range Status   06/25/2019 67 40 - 115 U/L Final     AST   Date Value Ref Range Status   06/25/2019 15 10 - 35 U/L Final     ALT   Date Value Ref Range Status   06/25/2019 13 9 - 46 U/L Final     CRP   Date Value Ref Range Status   06/25/2019 3.5 <8.0 mg/L Final     Rheumatoid Factor   Date Value Ref Range Status   01/18/2018 <14 <14 IU/mL Final     Uric Acid   Date Value Ref Range Status   06/25/2019 3.7 (L) 4.0 - 8.0 mg/dL Final     Comment:     Therapeutic target for gout patients: <6.0 mg/dL             Radiology/Diagnostic Studies:    None    Assessment/Discussion/Plan:   78 y.o. male with chronic inflammatory arthritis-stable on hydroxychloroquine 200 mg twice daily  2) gout-quiescent on allopurinol 300 mg daily    PLAN:  I will continue his medication without change.  Routine blood testing was ordered.    RTC:  I will see him back in 4 months.    Electronically signed by Piero Veronica MD

## 2019-11-01 LAB
ALBUMIN SERPL-MCNC: 4.2 G/DL (ref 3.6–5.1)
ALBUMIN/GLOB SERPL: 1.8 (CALC) (ref 1–2.5)
ALP SERPL-CCNC: 72 U/L (ref 40–115)
ALT SERPL-CCNC: 16 U/L (ref 9–46)
AST SERPL-CCNC: 17 U/L (ref 10–35)
BASOPHILS # BLD AUTO: 71 CELLS/UL (ref 0–200)
BASOPHILS NFR BLD AUTO: 1.4 %
BILIRUB SERPL-MCNC: 1.1 MG/DL (ref 0.2–1.2)
BUN SERPL-MCNC: 14 MG/DL (ref 7–25)
BUN/CREAT SERPL: ABNORMAL (CALC) (ref 6–22)
CALCIUM SERPL-MCNC: 9.3 MG/DL (ref 8.6–10.3)
CHLORIDE SERPL-SCNC: 101 MMOL/L (ref 98–110)
CO2 SERPL-SCNC: 33 MMOL/L (ref 20–32)
CREAT SERPL-MCNC: 0.96 MG/DL (ref 0.7–1.18)
CRP SERPL-MCNC: 5.5 MG/L
EOSINOPHIL # BLD AUTO: 138 CELLS/UL (ref 15–500)
EOSINOPHIL NFR BLD AUTO: 2.7 %
ERYTHROCYTE [DISTWIDTH] IN BLOOD BY AUTOMATED COUNT: 13 % (ref 11–15)
GFRSERPLBLD MDRD-ARVRAT: 76 ML/MIN/1.73M2
GLOBULIN SER CALC-MCNC: 2.4 G/DL (CALC) (ref 1.9–3.7)
GLUCOSE SERPL-MCNC: 124 MG/DL (ref 65–139)
HCT VFR BLD AUTO: 37.7 % (ref 38.5–50)
HGB BLD-MCNC: 13 G/DL (ref 13.2–17.1)
LYMPHOCYTES # BLD AUTO: 1219 CELLS/UL (ref 850–3900)
LYMPHOCYTES NFR BLD AUTO: 23.9 %
MCH RBC QN AUTO: 31.6 PG (ref 27–33)
MCHC RBC AUTO-ENTMCNC: 34.5 G/DL (ref 32–36)
MCV RBC AUTO: 91.7 FL (ref 80–100)
MONOCYTES # BLD AUTO: 377 CELLS/UL (ref 200–950)
MONOCYTES NFR BLD AUTO: 7.4 %
NEUTROPHILS # BLD AUTO: 3295 CELLS/UL (ref 1500–7800)
NEUTROPHILS NFR BLD AUTO: 64.6 %
PLATELET # BLD AUTO: 123 THOUSAND/UL (ref 140–400)
PLATELET BLD QL SMEAR: ABNORMAL
PMV BLD REES-ECKER: 11.7 FL (ref 7.5–12.5)
POTASSIUM SERPL-SCNC: 3.9 MMOL/L (ref 3.5–5.3)
PROT SERPL-MCNC: 6.6 G/DL (ref 6.1–8.1)
RBC # BLD AUTO: 4.11 MILLION/UL (ref 4.2–5.8)
SERVICE CMNT-IMP: ABNORMAL
SODIUM SERPL-SCNC: 141 MMOL/L (ref 135–146)
URATE SERPL-MCNC: 3.8 MG/DL (ref 4–8)
WBC # BLD AUTO: 5.1 THOUSAND/UL (ref 3.8–10.8)

## 2019-12-24 DIAGNOSIS — M19.90 CHRONIC INFLAMMATORY ARTHRITIS: ICD-10-CM

## 2019-12-24 DIAGNOSIS — M1A.09X0 IDIOPATHIC CHRONIC GOUT OF MULTIPLE SITES WITHOUT TOPHUS: ICD-10-CM

## 2019-12-26 RX ORDER — ALLOPURINOL 300 MG/1
TABLET ORAL
Qty: 90 TABLET | Refills: 1 | Status: SHIPPED | OUTPATIENT
Start: 2019-12-26 | End: 2020-03-03 | Stop reason: SDUPTHER

## 2019-12-26 RX ORDER — HYDROXYCHLOROQUINE SULFATE 200 MG/1
TABLET, FILM COATED ORAL
Qty: 180 TABLET | Refills: 1 | Status: SHIPPED | OUTPATIENT
Start: 2019-12-26 | End: 2020-03-03 | Stop reason: SDUPTHER

## 2020-03-03 ENCOUNTER — OFFICE VISIT (OUTPATIENT)
Dept: RHEUMATOLOGY | Facility: CLINIC | Age: 80
End: 2020-03-03
Payer: MEDICARE

## 2020-03-03 VITALS
WEIGHT: 254.63 LBS | DIASTOLIC BLOOD PRESSURE: 83 MMHG | TEMPERATURE: 99 F | SYSTOLIC BLOOD PRESSURE: 129 MMHG | BODY MASS INDEX: 33.59 KG/M2

## 2020-03-03 DIAGNOSIS — M1A.09X0 IDIOPATHIC CHRONIC GOUT OF MULTIPLE SITES WITHOUT TOPHUS: ICD-10-CM

## 2020-03-03 DIAGNOSIS — Z79.899 ENCOUNTER FOR LONG-TERM (CURRENT) DRUG USE: Primary | ICD-10-CM

## 2020-03-03 DIAGNOSIS — M19.90 CHRONIC INFLAMMATORY ARTHRITIS: ICD-10-CM

## 2020-03-03 PROCEDURE — 1159F MED LIST DOCD IN RCRD: CPT | Mod: S$GLB,,, | Performed by: INTERNAL MEDICINE

## 2020-03-03 PROCEDURE — 3079F DIAST BP 80-89 MM HG: CPT | Mod: S$GLB,,, | Performed by: INTERNAL MEDICINE

## 2020-03-03 PROCEDURE — 1125F PR PAIN SEVERITY QUANTIFIED, PAIN PRESENT: ICD-10-PCS | Mod: S$GLB,,, | Performed by: INTERNAL MEDICINE

## 2020-03-03 PROCEDURE — 99214 OFFICE O/P EST MOD 30 MIN: CPT | Mod: S$GLB,,, | Performed by: INTERNAL MEDICINE

## 2020-03-03 PROCEDURE — 99214 PR OFFICE/OUTPT VISIT, EST, LEVL IV, 30-39 MIN: ICD-10-PCS | Mod: S$GLB,,, | Performed by: INTERNAL MEDICINE

## 2020-03-03 PROCEDURE — 3079F PR MOST RECENT DIASTOLIC BLOOD PRESSURE 80-89 MM HG: ICD-10-PCS | Mod: S$GLB,,, | Performed by: INTERNAL MEDICINE

## 2020-03-03 PROCEDURE — 1101F PT FALLS ASSESS-DOCD LE1/YR: CPT | Mod: S$GLB,,, | Performed by: INTERNAL MEDICINE

## 2020-03-03 PROCEDURE — 1159F PR MEDICATION LIST DOCUMENTED IN MEDICAL RECORD: ICD-10-PCS | Mod: S$GLB,,, | Performed by: INTERNAL MEDICINE

## 2020-03-03 PROCEDURE — 1125F AMNT PAIN NOTED PAIN PRSNT: CPT | Mod: S$GLB,,, | Performed by: INTERNAL MEDICINE

## 2020-03-03 PROCEDURE — 1101F PR PT FALLS ASSESS DOC 0-1 FALLS W/OUT INJ PAST YR: ICD-10-PCS | Mod: S$GLB,,, | Performed by: INTERNAL MEDICINE

## 2020-03-03 PROCEDURE — 3074F PR MOST RECENT SYSTOLIC BLOOD PRESSURE < 130 MM HG: ICD-10-PCS | Mod: S$GLB,,, | Performed by: INTERNAL MEDICINE

## 2020-03-03 PROCEDURE — 3074F SYST BP LT 130 MM HG: CPT | Mod: S$GLB,,, | Performed by: INTERNAL MEDICINE

## 2020-03-03 RX ORDER — ALLOPURINOL 100 MG/1
200 TABLET ORAL DAILY
Qty: 60 TABLET | Refills: 5 | Status: SHIPPED | OUTPATIENT
Start: 2020-03-03 | End: 2020-07-08

## 2020-03-03 RX ORDER — HYDROXYCHLOROQUINE SULFATE 200 MG/1
200 TABLET, FILM COATED ORAL DAILY
Qty: 90 TABLET | Refills: 1 | Status: SHIPPED | OUTPATIENT
Start: 2020-03-03 | End: 2020-09-14 | Stop reason: SDUPTHER

## 2020-03-03 NOTE — PROGRESS NOTES
[facility] RHEUMATOLOGY           Follow-up visit    Notes dictated to M*Modal. Please forgive any unintended errors.  Subjective:       Patient ID:   NAME: Kennedy Thomas : 1940     79 y.o. male    Referring Doc: No ref. provider found  Other Physicians:    Chief Complaint:  Gout                                 RHEUMATOID ARTHRITIS      HPI/Interval History:  The patient is doing well.  He has had no episodes of gout.  He has no complaints of any red, hot, and/or swollen joints.    ROS:   GEN:    no fever, night sweats or weight loss  SKIN:   no rashes, erythema, bruising, or swelling, no Raynauds, no photosensitivity  HEENT: no changes in vision, no mouth ulcers, no sicca symptoms, no scalp tenderness, no jaw claudication.  CV:      no CP, PND, PATTERSON, orthopnea, no palpitations  PULM: no SOB, cough, hemoptysis, sputum or pleuritic pain  GI:       no GERD, no dysphagia, no hematemesis, no abdominal pain, nausea, vomiting, constipation, diarrhea, melanotic stools, BRBPR  :      no hematuria, dysuria  NEURO: no paresthesias, headaches, acute visual disturbances  MUSCULOSKELETAL:  No complaints of muscle or joint pain  PSYCH:   No increased insomnia, no increased anxiety, no increased depression    Past Medical/Surgical History:  Past Medical History:   Diagnosis Date    Arthritis     Gout     Heart block     Hypertension     Pancreatitis      Past Surgical History:   Procedure Laterality Date    APPENDECTOMY      CARDIAC PACEMAKER PLACEMENT         Allergies:  Review of patient's allergies indicates:   Allergen Reactions    Statins-hmg-coa reductase inhibitors        Social/Family History:  Social History     Socioeconomic History    Marital status:      Spouse name: Not on file    Number of children: Not on file    Years of education: Not on file    Highest education level: Not on file   Occupational History    Not on file   Social Needs    Financial resource strain: Not on file     Food insecurity:     Worry: Not on file     Inability: Not on file    Transportation needs:     Medical: Not on file     Non-medical: Not on file   Tobacco Use    Smoking status: Former Smoker    Smokeless tobacco: Never Used   Substance and Sexual Activity    Alcohol use: No    Drug use: No    Sexual activity: Not on file   Lifestyle    Physical activity:     Days per week: Not on file     Minutes per session: Not on file    Stress: Not on file   Relationships    Social connections:     Talks on phone: Not on file     Gets together: Not on file     Attends [employer] service: Not on file     Active member of club or organization: Not on file     Attends meetings of clubs or organizations: Not on file     Relationship status: Not on file   Other Topics Concern    Not on file   Social History Narrative    Not on file     Family History   Problem Relation Age of Onset    Diabetes Mellitus Mother      FAMILY HISTORY: negative for Connective Tissue Disease      Medications:    Current Outpatient Medications:     allopurinoL (ZYLOPRIM) 100 MG tablet, Take 2 tablets (200 mg total) by mouth once daily., Disp: 60 tablet, Rfl: 5    benazepril (LOTENSIN) 40 MG tablet, Take 40 mg by mouth., Disp: , Rfl:     bumetanide (BUMEX) 1 MG tablet, , Disp: , Rfl:     ergocalciferol, vitamin D2, (VITAMIN D2 ORAL), Take 5,000 Units by mouth once daily., Disp: , Rfl:     hydroxychloroquine (PLAQUENIL) 200 mg tablet, Take 1 tablet (200 mg total) by mouth once daily., Disp: 90 tablet, Rfl: 1    metoprolol succinate (TOPROL-XL) 50 MG 24 hr tablet, , Disp: , Rfl:     potassium chloride (KLOR-CON) 10 MEQ TbSR, Take 10 mEq by mouth once daily., Disp: , Rfl:     SYMBICORT 160-4.5 mcg/actuation HFAA, INL 2 PFS ITL BID, Disp: , Rfl: 0    terazosin (HYTRIN) 5 MG capsule, Take 5 mg by mouth., Disp: , Rfl:     warfarin (COUMADIN) 5 MG tablet, Take 7.5 mg by mouth. , Disp: , Rfl:       Objective:     Vitals:  Blood pressure 129/83,  temperature 98.8 °F (37.1 °C), weight 115.5 kg (254 lb 9.6 oz).    Physical Examination:   GEN: wn/wd male in no apparent distress  SKIN: no rashes, no sclerodactyly, no Raynaud's, no periungual erythema, no digital tip ulcerations, no nailbed pitting  HEAD: no alopecia, no scalp tenderness, no temporal artery tenderness or induration.  EYES: no pallor, no icterus, PERRLA  ENT:  no thrush, no mucosal dryness or ulcerations, adequate oral hygiene & dentition.  NECK: supple x 6, no masses, no thyromegaly, no lymphadenopathy.  CV:   S1 and S2 regular, no murmurs, gallop or rubs  CHEST: Normal respiratory effort;  normal breath sounds/no adventitious sounds. No signs of consolidation.  ABD: non-tender and non-distended; soft; normal bowel sounds; no rebound/guarding or tenderness. No hepatosplenomegaly.  Musculoskeletal:  No evidence of active synovitis.  No progressive deformity, no peripheral tophi     EXTREM: no clubbing, cyanosis or edema. normal pulses.  NEURO:  grossly intact; motor/sensory WNL; no tremors  PSYCH:  normal mood, affect and behavior    Labs:   Lab Results   Component Value Date    WBC 5.1 10/31/2019    HGB 13.0 (L) 10/31/2019    HCT 37.7 (L) 10/31/2019    MCV 91.7 10/31/2019     (L) 10/31/2019   CMP@  Sodium   Date Value Ref Range Status   10/31/2019 141 135 - 146 mmol/L Final   05/18/2017 141 134 - 144 mmol/L      Potassium   Date Value Ref Range Status   10/31/2019 3.9 3.5 - 5.3 mmol/L Final     Chloride   Date Value Ref Range Status   10/31/2019 101 98 - 110 mmol/L Final   05/18/2017 103 98 - 110 mmol/L      CO2   Date Value Ref Range Status   10/31/2019 33 (H) 20 - 32 mmol/L Final     Glucose   Date Value Ref Range Status   10/31/2019 124 65 - 139 mg/dL Final     Comment:               Non-fasting reference interval        05/18/2017 99 70 - 99 mg/dL      BUN, Bld   Date Value Ref Range Status   10/31/2019 14 7 - 25 mg/dL Final     Creatinine   Date Value Ref Range Status   10/31/2019  0.96 0.70 - 1.18 mg/dL Final     Comment:     For patients >49 years of age, the reference limit  for Creatinine is approximately 13% higher for people  identified as -American.        05/18/2017 1.10 0.60 - 1.40 mg/dL      Calcium   Date Value Ref Range Status   10/31/2019 9.3 8.6 - 10.3 mg/dL Final     Total Protein   Date Value Ref Range Status   10/31/2019 6.6 6.1 - 8.1 g/dL Final     Albumin   Date Value Ref Range Status   10/31/2019 4.2 3.6 - 5.1 g/dL Final   05/18/2017 4.7 3.1 - 4.7 g/dL      Total Bilirubin   Date Value Ref Range Status   10/31/2019 1.1 0.2 - 1.2 mg/dL Final     Alkaline Phosphatase   Date Value Ref Range Status   10/31/2019 72 40 - 115 U/L Final     AST   Date Value Ref Range Status   10/31/2019 17 10 - 35 U/L Final     ALT   Date Value Ref Range Status   10/31/2019 16 9 - 46 U/L Final     CRP   Date Value Ref Range Status   10/31/2019 5.5 <8.0 mg/L Final     Rheumatoid Factor   Date Value Ref Range Status   01/18/2018 <14 <14 IU/mL Final     Uric Acid   Date Value Ref Range Status   10/31/2019 3.8 (L) 4.0 - 8.0 mg/dL Final     Comment:     Therapeutic target for gout patients: <6.0 mg/dL             Radiology/Diagnostic Studies:    None    Assessment/Discussion/Plan:   79 y.o. male with seronegative inflammatory arthritis-excellent control on hydroxychloroquine 400 mg daily  2) gout-fully controlled on allopurinol 300 mg daily    PLAN:  I have discussed both treatments with him and I have shared with him my feeling that we may be able to control him on less medication.  He is certainly open to my trying.  I have therefore decreased is hydroxychloroquine to 200 mg once a day and I have decreased his allopurinol from 300 mg daily to a new dose of 200 mg daily.  I have ordered blood testing to be performed in 2 months.    RTC:  I will see him back in 4 months    Electronically signed by Piero Veronica MD

## 2020-07-07 ENCOUNTER — OFFICE VISIT (OUTPATIENT)
Dept: RHEUMATOLOGY | Facility: CLINIC | Age: 80
End: 2020-07-07
Payer: MEDICARE

## 2020-07-07 VITALS
TEMPERATURE: 99 F | WEIGHT: 251.5 LBS | BODY MASS INDEX: 33.18 KG/M2 | DIASTOLIC BLOOD PRESSURE: 81 MMHG | SYSTOLIC BLOOD PRESSURE: 123 MMHG

## 2020-07-07 DIAGNOSIS — M19.90 CHRONIC INFLAMMATORY ARTHRITIS: Primary | ICD-10-CM

## 2020-07-07 DIAGNOSIS — M10.9 GOUT, UNSPECIFIED CAUSE, UNSPECIFIED CHRONICITY, UNSPECIFIED SITE: ICD-10-CM

## 2020-07-07 DIAGNOSIS — Z79.899 ENCOUNTER FOR LONG-TERM (CURRENT) DRUG USE: ICD-10-CM

## 2020-07-07 PROCEDURE — 1101F PR PT FALLS ASSESS DOC 0-1 FALLS W/OUT INJ PAST YR: ICD-10-PCS | Mod: S$GLB,,, | Performed by: INTERNAL MEDICINE

## 2020-07-07 PROCEDURE — 3074F SYST BP LT 130 MM HG: CPT | Mod: S$GLB,,, | Performed by: INTERNAL MEDICINE

## 2020-07-07 PROCEDURE — 3074F PR MOST RECENT SYSTOLIC BLOOD PRESSURE < 130 MM HG: ICD-10-PCS | Mod: S$GLB,,, | Performed by: INTERNAL MEDICINE

## 2020-07-07 PROCEDURE — 3079F PR MOST RECENT DIASTOLIC BLOOD PRESSURE 80-89 MM HG: ICD-10-PCS | Mod: S$GLB,,, | Performed by: INTERNAL MEDICINE

## 2020-07-07 PROCEDURE — 1125F AMNT PAIN NOTED PAIN PRSNT: CPT | Mod: S$GLB,,, | Performed by: INTERNAL MEDICINE

## 2020-07-07 PROCEDURE — 1159F PR MEDICATION LIST DOCUMENTED IN MEDICAL RECORD: ICD-10-PCS | Mod: S$GLB,,, | Performed by: INTERNAL MEDICINE

## 2020-07-07 PROCEDURE — 1125F PR PAIN SEVERITY QUANTIFIED, PAIN PRESENT: ICD-10-PCS | Mod: S$GLB,,, | Performed by: INTERNAL MEDICINE

## 2020-07-07 PROCEDURE — 99214 OFFICE O/P EST MOD 30 MIN: CPT | Mod: S$GLB,,, | Performed by: INTERNAL MEDICINE

## 2020-07-07 PROCEDURE — 1159F MED LIST DOCD IN RCRD: CPT | Mod: S$GLB,,, | Performed by: INTERNAL MEDICINE

## 2020-07-07 PROCEDURE — 3079F DIAST BP 80-89 MM HG: CPT | Mod: S$GLB,,, | Performed by: INTERNAL MEDICINE

## 2020-07-07 PROCEDURE — 99214 PR OFFICE/OUTPT VISIT, EST, LEVL IV, 30-39 MIN: ICD-10-PCS | Mod: S$GLB,,, | Performed by: INTERNAL MEDICINE

## 2020-07-07 PROCEDURE — 1101F PT FALLS ASSESS-DOCD LE1/YR: CPT | Mod: S$GLB,,, | Performed by: INTERNAL MEDICINE

## 2020-07-07 NOTE — PROGRESS NOTES
[facility] RHEUMATOLOGY           Follow-up visit    Notes dictated to M*Modal. Please forgive any unintended errors.  Subjective:       Patient ID:   NAME: Kennedy Thomas : 1940     79 y.o. male    Referring Doc: No ref. provider found  Other Physicians:    Chief Complaint:  Gout      HPI/Interval History:  Patient has been doing well.  He has not had a gout attack since his last visit.  He also has not noted any red, hot, and/or swollen joints.    ROS:   GEN:    no fever, night sweats or weight loss  SKIN:   no rashes, erythema, bruising, or swelling, no Raynauds, no photosensitivity  HEENT: no changes in vision, no mouth ulcers, no sicca symptoms, no scalp tenderness, no jaw claudication.  CV:      no CP, PND, PATTERSON, orthopnea, no palpitations  PULM: no SOB, cough, hemoptysis, sputum or pleuritic pain  GI:       no GERD, no dysphagia, no hematemesis, no abdominal pain, nausea, vomiting, constipation, diarrhea, melanotic stools, BRBPR  :      no hematuria, dysuria  NEURO: no paresthesias, headaches, acute visual disturbances  MUSCULOSKELETAL:  As above  PSYCH:   No increased insomnia, no increased anxiety, no increased depression    Past Medical/Surgical History:  Past Medical History:   Diagnosis Date    Arthritis     Gout     Heart block     Hypertension     Pancreatitis      Past Surgical History:   Procedure Laterality Date    APPENDECTOMY      CARDIAC PACEMAKER PLACEMENT         Allergies:  Review of patient's allergies indicates:   Allergen Reactions    Statins-hmg-coa reductase inhibitors        Social/Family History:  Social History     Socioeconomic History    Marital status:      Spouse name: Not on file    Number of children: Not on file    Years of education: Not on file    Highest education level: Not on file   Occupational History    Not on file   Social Needs    Financial resource strain: Not on file    Food insecurity     Worry: Not on file     Inability: Not on file     Transportation needs     Medical: Not on file     Non-medical: Not on file   Tobacco Use    Smoking status: Former Smoker    Smokeless tobacco: Never Used   Substance and Sexual Activity    Alcohol use: No    Drug use: No    Sexual activity: Not on file   Lifestyle    Physical activity     Days per week: Not on file     Minutes per session: Not on file    Stress: Not on file   Relationships    Social connections     Talks on phone: Not on file     Gets together: Not on file     Attends [employer] service: Not on file     Active member of club or organization: Not on file     Attends meetings of clubs or organizations: Not on file     Relationship status: Not on file   Other Topics Concern    Not on file   Social History Narrative    Not on file     Family History   Problem Relation Age of Onset    Diabetes Mellitus Mother      FAMILY HISTORY: negative for Connective Tissue Disease      Medications:    Current Outpatient Medications:     allopurinoL (ZYLOPRIM) 100 MG tablet, Take 2 tablets (200 mg total) by mouth once daily. (Patient taking differently: Take 200 mg by mouth once daily. Takes 100mg), Disp: 60 tablet, Rfl: 5    benazepril (LOTENSIN) 40 MG tablet, Take 40 mg by mouth., Disp: , Rfl:     bumetanide (BUMEX) 1 MG tablet, , Disp: , Rfl:     ergocalciferol, vitamin D2, (VITAMIN D2 ORAL), Take 5,000 Units by mouth once daily., Disp: , Rfl:     hydroxychloroquine (PLAQUENIL) 200 mg tablet, Take 1 tablet (200 mg total) by mouth once daily., Disp: 90 tablet, Rfl: 1    metoprolol succinate (TOPROL-XL) 50 MG 24 hr tablet, , Disp: , Rfl:     potassium chloride (KLOR-CON) 10 MEQ TbSR, Take 10 mEq by mouth once daily., Disp: , Rfl:     SYMBICORT 160-4.5 mcg/actuation HFAA, INL 2 PFS ITL BID, Disp: , Rfl: 0    terazosin (HYTRIN) 5 MG capsule, Take 5 mg by mouth., Disp: , Rfl:     warfarin (COUMADIN) 5 MG tablet, Take 7.5 mg by mouth. , Disp: , Rfl:       Objective:     Vitals:  Blood pressure  123/81, temperature 99 °F (37.2 °C), weight 114.1 kg (251 lb 8 oz).    Physical Examination:   GEN: wn/wd male in no apparent distress  SKIN: no rashes, no sclerodactyly, no Raynaud's, no periungual erythema, no digital tip ulcerations, no nailbed pitting  HEAD: no alopecia, no scalp tenderness, no temporal artery tenderness or induration.  EYES: no pallor, no icterus, PERRLA  ENT:  no thrush, no mucosal dryness or ulcerations, adequate oral hygiene & dentition.  NECK: supple x 6, no masses, no thyromegaly, no lymphadenopathy.  CV:   S1 and S2 regular, no murmurs, gallop or rubs  CHEST: Normal respiratory effort;  normal breath sounds/no adventitious sounds. No signs of consolidation.  ABD: non-tender and non-distended; soft; normal bowel sounds; no rebound/guarding or tenderness. No hepatosplenomegaly.  Musculoskeletal:  No evidence of active synovitis.  No progressive deformity.  No peripheral tophi  EXTREM: no clubbing, cyanosis or edema. normal pulses.  NEURO:  grossly intact; motor/sensory WNL; no tremors  PSYCH:  normal mood, affect and behavior    Labs:   Lab Results   Component Value Date    WBC 5.1 10/31/2019    HGB 13.0 (L) 10/31/2019    HCT 37.7 (L) 10/31/2019    MCV 91.7 10/31/2019     (L) 10/31/2019   CMP@  Sodium   Date Value Ref Range Status   10/31/2019 141 135 - 146 mmol/L Final   05/18/2017 141 134 - 144 mmol/L      Potassium   Date Value Ref Range Status   10/31/2019 3.9 3.5 - 5.3 mmol/L Final     Chloride   Date Value Ref Range Status   10/31/2019 101 98 - 110 mmol/L Final   05/18/2017 103 98 - 110 mmol/L      CO2   Date Value Ref Range Status   10/31/2019 33 (H) 20 - 32 mmol/L Final     Glucose   Date Value Ref Range Status   10/31/2019 124 65 - 139 mg/dL Final     Comment:               Non-fasting reference interval        05/18/2017 99 70 - 99 mg/dL      BUN, Bld   Date Value Ref Range Status   10/31/2019 14 7 - 25 mg/dL Final     Creatinine   Date Value Ref Range Status   10/31/2019  0.96 0.70 - 1.18 mg/dL Final     Comment:     For patients >49 years of age, the reference limit  for Creatinine is approximately 13% higher for people  identified as -American.        05/18/2017 1.10 0.60 - 1.40 mg/dL      Calcium   Date Value Ref Range Status   10/31/2019 9.3 8.6 - 10.3 mg/dL Final     Total Protein   Date Value Ref Range Status   10/31/2019 6.6 6.1 - 8.1 g/dL Final     Albumin   Date Value Ref Range Status   10/31/2019 4.2 3.6 - 5.1 g/dL Final   05/18/2017 4.7 3.1 - 4.7 g/dL      Total Bilirubin   Date Value Ref Range Status   10/31/2019 1.1 0.2 - 1.2 mg/dL Final     Alkaline Phosphatase   Date Value Ref Range Status   10/31/2019 72 40 - 115 U/L Final     AST   Date Value Ref Range Status   10/31/2019 17 10 - 35 U/L Final     ALT   Date Value Ref Range Status   10/31/2019 16 9 - 46 U/L Final     CRP   Date Value Ref Range Status   10/31/2019 5.5 <8.0 mg/L Final     Rheumatoid Factor   Date Value Ref Range Status   01/18/2018 <14 <14 IU/mL Final     Uric Acid   Date Value Ref Range Status   10/31/2019 3.8 (L) 4.0 - 8.0 mg/dL Final     Comment:     Therapeutic target for gout patients: <6.0 mg/dL             Radiology/Diagnostic Studies:    None    Assessment/Discussion/Plan:   79 y.o. male with seronegative inflammatory arthritis-excellent control on hydroxychloroquine 200 mg daily  2) gouty arthritis-controlled on allopurinol 200 mg daily    PLAN:  I will continue his medication without change.  I note no blood testing done in the last year!  This includes 2 sets of orders that he did not have drawn!  I have explained that it is absolutely imperative that blood test be performed as ordered.  He promises to go today to have those done.  I indicated that I would not renew any prescriptions until I had a chance to review those.  It appears he had misunderstood the tapering of allopurinol 6 months ago.  I discontinued the 300 mg capsules and substituted 2 100 mg capsules daily.  I believe he  has been taking only 1 daily. When I review the blood tests, I will get back to him about what the appropriate dose should be.  Meanwhile, he is to take 200 mg daily    RTC:  I will see him back in 4 months    Electronically signed by Piero Veronica MD

## 2020-07-08 ENCOUNTER — TELEPHONE (OUTPATIENT)
Dept: RHEUMATOLOGY | Facility: CLINIC | Age: 80
End: 2020-07-08

## 2020-07-08 DIAGNOSIS — M1A.09X0 IDIOPATHIC CHRONIC GOUT OF MULTIPLE SITES WITHOUT TOPHUS: ICD-10-CM

## 2020-07-08 DIAGNOSIS — Z79.899 ENCOUNTER FOR LONG-TERM (CURRENT) DRUG USE: Primary | ICD-10-CM

## 2020-07-08 DIAGNOSIS — M10.9 GOUT, UNSPECIFIED CAUSE, UNSPECIFIED CHRONICITY, UNSPECIFIED SITE: ICD-10-CM

## 2020-07-08 LAB
ALBUMIN SERPL-MCNC: 4.3 G/DL (ref 3.6–5.1)
ALBUMIN/GLOB SERPL: 1.9 (CALC) (ref 1–2.5)
ALP SERPL-CCNC: 56 U/L (ref 35–144)
ALT SERPL-CCNC: 13 U/L (ref 9–46)
AST SERPL-CCNC: 15 U/L (ref 10–35)
BASOPHILS # BLD AUTO: 70 CELLS/UL (ref 0–200)
BASOPHILS NFR BLD AUTO: 1.4 %
BILIRUB SERPL-MCNC: 1 MG/DL (ref 0.2–1.2)
BUN SERPL-MCNC: 16 MG/DL (ref 7–25)
BUN/CREAT SERPL: ABNORMAL (CALC) (ref 6–22)
CALCIUM SERPL-MCNC: 9.5 MG/DL (ref 8.6–10.3)
CHLORIDE SERPL-SCNC: 103 MMOL/L (ref 98–110)
CO2 SERPL-SCNC: 34 MMOL/L (ref 20–32)
CREAT SERPL-MCNC: 0.88 MG/DL (ref 0.7–1.18)
CRP SERPL-MCNC: 2.4 MG/L
EOSINOPHIL # BLD AUTO: 110 CELLS/UL (ref 15–500)
EOSINOPHIL NFR BLD AUTO: 2.2 %
ERYTHROCYTE [DISTWIDTH] IN BLOOD BY AUTOMATED COUNT: 13.1 % (ref 11–15)
GFRSERPLBLD MDRD-ARVRAT: 83 ML/MIN/1.73M2
GLOBULIN SER CALC-MCNC: 2.3 G/DL (CALC) (ref 1.9–3.7)
GLUCOSE SERPL-MCNC: 110 MG/DL (ref 65–139)
HCT VFR BLD AUTO: 36.2 % (ref 38.5–50)
HGB BLD-MCNC: 12.2 G/DL (ref 13.2–17.1)
LYMPHOCYTES # BLD AUTO: 1760 CELLS/UL (ref 850–3900)
LYMPHOCYTES NFR BLD AUTO: 35.2 %
MCH RBC QN AUTO: 31.3 PG (ref 27–33)
MCHC RBC AUTO-ENTMCNC: 33.7 G/DL (ref 32–36)
MCV RBC AUTO: 92.8 FL (ref 80–100)
MONOCYTES # BLD AUTO: 440 CELLS/UL (ref 200–950)
MONOCYTES NFR BLD AUTO: 8.8 %
NEUTROPHILS # BLD AUTO: 2620 CELLS/UL (ref 1500–7800)
NEUTROPHILS NFR BLD AUTO: 52.4 %
PLATELET # BLD AUTO: 107 THOUSAND/UL (ref 140–400)
PLATELET BLD QL SMEAR: ABNORMAL
PMV BLD REES-ECKER: 11.8 FL (ref 7.5–12.5)
POTASSIUM SERPL-SCNC: 4.3 MMOL/L (ref 3.5–5.3)
PROT SERPL-MCNC: 6.6 G/DL (ref 6.1–8.1)
RBC # BLD AUTO: 3.9 MILLION/UL (ref 4.2–5.8)
SODIUM SERPL-SCNC: 142 MMOL/L (ref 135–146)
URATE SERPL-MCNC: 5.3 MG/DL (ref 4–8)
WBC # BLD AUTO: 5 THOUSAND/UL (ref 3.8–10.8)

## 2020-07-08 RX ORDER — ALLOPURINOL 100 MG/1
100 TABLET ORAL DAILY
Qty: 30 TABLET | Refills: 5 | Status: SHIPPED | OUTPATIENT
Start: 2020-07-08 | End: 2021-07-06

## 2020-07-08 NOTE — PROGRESS NOTES
Tell the patient he may continue taking a single 100 mg allopurinol tablet daily.  Let's have a serum uric acid level repeated in 2 months.  Thanks

## 2020-07-08 NOTE — TELEPHONE ENCOUNTER
----- Message from Piero Veronica MD sent at 7/8/2020  9:02 AM CDT -----  Tell the patient he may continue taking a single 100 mg allopurinol tablet daily.  Let's have a serum uric acid level repeated in 2 months.  Thanks

## 2020-08-06 DIAGNOSIS — R31.9 BLOOD IN URINE: Primary | ICD-10-CM

## 2020-08-12 DIAGNOSIS — R31.9 BLOOD IN URINE: Primary | ICD-10-CM

## 2020-08-13 ENCOUNTER — HOSPITAL ENCOUNTER (OUTPATIENT)
Dept: RADIOLOGY | Facility: HOSPITAL | Age: 80
Discharge: HOME | End: 2020-08-13
Attending: FAMILY MEDICINE
Payer: MEDICARE

## 2020-08-13 DIAGNOSIS — R31.9 BLOOD IN URINE: ICD-10-CM

## 2020-08-13 LAB
CREAT SERPL-MCNC: 1.1 MG/DL (ref 0.5–1.4)
SAMPLE: NORMAL

## 2020-08-13 PROCEDURE — 74178 CT ABD&PLV WO CNTR FLWD CNTR: CPT | Mod: TC,PO

## 2020-08-13 PROCEDURE — 25500020 PHARM REV CODE 255: Mod: PO | Performed by: FAMILY MEDICINE

## 2020-08-13 RX ORDER — DONEPEZIL HYDROCHLORIDE 5 MG/1
5 TABLET, FILM COATED ORAL DAILY
COMMUNITY
Start: 2020-08-04 | End: 2022-04-07

## 2020-08-13 RX ADMIN — IOHEXOL 100 ML: 350 INJECTION, SOLUTION INTRAVENOUS at 03:08

## 2020-08-14 ENCOUNTER — OFFICE VISIT (OUTPATIENT)
Dept: SURGERY | Facility: CLINIC | Age: 80
End: 2020-08-14
Payer: MEDICARE

## 2020-08-14 VITALS — HEIGHT: 73 IN | WEIGHT: 251 LBS | TEMPERATURE: 97 F | BODY MASS INDEX: 33.27 KG/M2

## 2020-08-14 DIAGNOSIS — M62.08 RECTUS DIASTASIS: ICD-10-CM

## 2020-08-14 DIAGNOSIS — K42.9 UMBILICAL HERNIA WITHOUT OBSTRUCTION AND WITHOUT GANGRENE: Primary | ICD-10-CM

## 2020-08-14 PROCEDURE — 1101F PT FALLS ASSESS-DOCD LE1/YR: CPT | Mod: S$GLB,,, | Performed by: SURGERY

## 2020-08-14 PROCEDURE — 1159F PR MEDICATION LIST DOCUMENTED IN MEDICAL RECORD: ICD-10-PCS | Mod: S$GLB,,, | Performed by: SURGERY

## 2020-08-14 PROCEDURE — 1101F PR PT FALLS ASSESS DOC 0-1 FALLS W/OUT INJ PAST YR: ICD-10-PCS | Mod: S$GLB,,, | Performed by: SURGERY

## 2020-08-14 PROCEDURE — 1126F AMNT PAIN NOTED NONE PRSNT: CPT | Mod: S$GLB,,, | Performed by: SURGERY

## 2020-08-14 PROCEDURE — 99203 OFFICE O/P NEW LOW 30 MIN: CPT | Mod: S$GLB,,, | Performed by: SURGERY

## 2020-08-14 PROCEDURE — 99203 PR OFFICE/OUTPT VISIT, NEW, LEVL III, 30-44 MIN: ICD-10-PCS | Mod: S$GLB,,, | Performed by: SURGERY

## 2020-08-14 PROCEDURE — 1159F MED LIST DOCD IN RCRD: CPT | Mod: S$GLB,,, | Performed by: SURGERY

## 2020-08-14 PROCEDURE — 1126F PR PAIN SEVERITY QUANTIFIED, NO PAIN PRESENT: ICD-10-PCS | Mod: S$GLB,,, | Performed by: SURGERY

## 2020-08-14 NOTE — PROGRESS NOTES
Subjective:       Patient ID: Kennedy Thomas is a 79 y.o. male.    Chief Complaint: Other (Referred by  to eval poss umbilical hernia)      HPI:  Patient is referred for evaluation of umbilical hernia.  He states he has had the umbilical hernia for 3-4 years.  It has gotten slightly larger during that time.  He denies any associated pain or obstructive type symptoms.  He also complains of some bulging in his upper abdomen.      Past Medical History:   Diagnosis Date    Arthritis     Atrial fibrillation     CKD (chronic kidney disease)     COPD (chronic obstructive pulmonary disease)     Diabetes mellitus, type 2     Gout     Heart block     Hypertension     Pancreatitis     Peripheral vascular disease      Past Surgical History:   Procedure Laterality Date    APPENDECTOMY      CARDIAC PACEMAKER PLACEMENT  2010    EYE SURGERY      INGUINAL HERNIA REPAIR Left      Review of patient's allergies indicates:   Allergen Reactions    Statins-hmg-coa reductase inhibitors      Medication List with Changes/Refills   Current Medications    ALLOPURINOL (ZYLOPRIM) 100 MG TABLET    Take 1 tablet (100 mg total) by mouth once daily.    BENAZEPRIL (LOTENSIN) 40 MG TABLET    Take 40 mg by mouth.    BUMETANIDE (BUMEX) 1 MG TABLET        DONEPEZIL (ARICEPT) 5 MG TABLET        ERGOCALCIFEROL, VITAMIN D2, (VITAMIN D2 ORAL)    Take 5,000 Units by mouth once daily.    HYDROXYCHLOROQUINE (PLAQUENIL) 200 MG TABLET    Take 1 tablet (200 mg total) by mouth once daily.    METOPROLOL SUCCINATE (TOPROL-XL) 50 MG 24 HR TABLET        POTASSIUM CHLORIDE (KLOR-CON) 10 MEQ TBSR    Take 10 mEq by mouth once daily.    SYMBICORT 160-4.5 MCG/ACTUATION HFAA    as needed.     TERAZOSIN (HYTRIN) 5 MG CAPSULE    Take 5 mg by mouth.    WARFARIN (COUMADIN) 5 MG TABLET    Take 7.5 mg by mouth.      Family History   Problem Relation Age of Onset    Diabetes Mellitus Mother      Social History     Socioeconomic History    Marital status:       Spouse name: Not on file    Number of children: Not on file    Years of education: Not on file    Highest education level: Not on file   Occupational History    Not on file   Social Needs    Financial resource strain: Not on file    Food insecurity     Worry: Not on file     Inability: Not on file    Transportation needs     Medical: Not on file     Non-medical: Not on file   Tobacco Use    Smoking status: Former Smoker    Smokeless tobacco: Never Used   Substance and Sexual Activity    Alcohol use: No    Drug use: No    Sexual activity: Not on file   Lifestyle    Physical activity     Days per week: Not on file     Minutes per session: Not on file    Stress: Not on file   Relationships    Social connections     Talks on phone: Not on file     Gets together: Not on file     Attends Orthodoxy service: Not on file     Active member of club or organization: Not on file     Attends meetings of clubs or organizations: Not on file     Relationship status: Not on file   Other Topics Concern    Not on file   Social History Narrative    Not on file         Review of Systems   Constitutional: Negative for appetite change, chills, fever and unexpected weight change.   HENT: Negative for hearing loss, rhinorrhea, sore throat and voice change.    Eyes: Negative for photophobia and visual disturbance.   Respiratory: Negative for cough, choking and shortness of breath.    Cardiovascular: Negative for chest pain, palpitations and leg swelling.   Gastrointestinal: Negative for abdominal pain, blood in stool, constipation, diarrhea, nausea and vomiting.   Endocrine: Negative for cold intolerance, heat intolerance and polyphagia.   Genitourinary: Negative for dysuria.   Musculoskeletal: Negative for arthralgias and back pain.   Skin: Negative for color change.   Neurological: Negative for dizziness, seizures, syncope and headaches.   Hematological: Negative for adenopathy. Does not bruise/bleed easily.        Objective:      Physical Exam  Constitutional:       General: He is not in acute distress.     Appearance: Normal appearance. He is well-developed. He is not toxic-appearing.   HENT:      Head: Normocephalic and atraumatic. No abrasion or laceration.      Right Ear: External ear normal.      Left Ear: External ear normal.      Nose: Nose normal.   Eyes:      Pupils: Pupils are equal, round, and reactive to light.   Neck:      Musculoskeletal: Normal range of motion.      Thyroid: No thyroid mass or thyromegaly.      Trachea: Trachea normal. No tracheal deviation.   Cardiovascular:      Rate and Rhythm: Normal rate and regular rhythm.   Pulmonary:      Effort: Pulmonary effort is normal. No tachypnea, accessory muscle usage or respiratory distress.   Abdominal:      General: Bowel sounds are normal. There is no distension.      Palpations: Abdomen is soft. There is no mass.      Tenderness: There is no abdominal tenderness. Negative signs include Padilla's sign and McBurney's sign.      Hernia: A hernia is present. Hernia is present in the umbilical area.          Comments: Upper abdomen demonstrates rectus diastasis without fascial defect, small easily reducible umbilical hernia   Lymphadenopathy:      Cervical: No cervical adenopathy.   Skin:     General: Skin is warm.   Neurological:      Mental Status: He is alert.      Coordination: Coordination normal.      Gait: Gait normal.   Psychiatric:         Speech: Speech normal.         Behavior: Behavior normal.         Assessment/Plan:   Umbilical hernia without obstruction and without gangrene    Rectus diastasis      Patient has multiple medical problems including atrial fibrillation, pacemaker, diabetes, COPD.  He has abdominal obesity.  All of these factors increase his risk of recurrence significantly.  In addition, his comorbidities make him a higher surgical risk patient.  For now, I recommend observation only as this is a very small hernia. I did discuss  with him that if he lost some weight, further consideration could possibly be made to repair the hernia, as his recurrence risk would be less.  He would still remain a high risk surgical patient however.    I believe most of his complaints are centered more around the rectus diastasis which is not something that is amenable to surgical repair.      Impression/Treatment Plan: Observation      Follow up for F/U - As needed.

## 2020-08-14 NOTE — LETTER
August 14, 2020      Fernando Eagle MD  [address]  [place] [address]           [facility]  [address]  [place] [address]  Phone: [phone]  Fax: [phone]          Patient: Kennedy Thomas   MR Number: [id number]   YOB: 1940   Date of Visit: 8/14/2020       Dear Dr. Fernando Eagle:    Thank you for referring Kennedy Thomas to me for evaluation. Attached you will find relevant portions of my assessment and plan of care.    If you have questions, please do not hesitate to call me. I look forward to following Kennedy Thomas along with you.    Sincerely,    Lakeisha Yates MD    Enclosure  CC:  No Recipients    If you would like to receive this communication electronically, please contact [email or web address] or [phone] to request more information on [employer] access.    For providers and/or their staff who would like to refer a patient to [facility], please contact us through our one-stop-shop provider referral line, [facility], at [phone].    If you feel you have received this communication in error or would no longer like to receive these types of communications, please e-mail [email or web address]

## 2020-09-04 LAB — URATE SERPL-MCNC: 6 MG/DL (ref 4–8)

## 2020-09-14 DIAGNOSIS — M05.79 RHEUMATOID ARTHRITIS INVOLVING MULTIPLE SITES WITH POSITIVE RHEUMATOID FACTOR: Primary | ICD-10-CM

## 2020-09-14 DIAGNOSIS — M19.90 CHRONIC INFLAMMATORY ARTHRITIS: ICD-10-CM

## 2020-09-14 RX ORDER — HYDROXYCHLOROQUINE SULFATE 200 MG/1
200 TABLET, FILM COATED ORAL DAILY
Qty: 90 TABLET | Refills: 1 | Status: SHIPPED | OUTPATIENT
Start: 2020-09-14 | End: 2021-04-07 | Stop reason: SDUPTHER

## 2020-11-16 ENCOUNTER — OFFICE VISIT (OUTPATIENT)
Dept: RHEUMATOLOGY | Facility: CLINIC | Age: 80
End: 2020-11-16
Payer: MEDICARE

## 2020-11-16 VITALS
BODY MASS INDEX: 32.81 KG/M2 | TEMPERATURE: 97 F | DIASTOLIC BLOOD PRESSURE: 82 MMHG | SYSTOLIC BLOOD PRESSURE: 139 MMHG | WEIGHT: 248.69 LBS

## 2020-11-16 DIAGNOSIS — M05.79 RHEUMATOID ARTHRITIS INVOLVING MULTIPLE SITES WITH POSITIVE RHEUMATOID FACTOR: ICD-10-CM

## 2020-11-16 DIAGNOSIS — Z79.899 ENCOUNTER FOR LONG-TERM (CURRENT) DRUG USE: ICD-10-CM

## 2020-11-16 DIAGNOSIS — M10.9 GOUT, UNSPECIFIED CAUSE, UNSPECIFIED CHRONICITY, UNSPECIFIED SITE: Primary | ICD-10-CM

## 2020-11-16 PROCEDURE — 99213 OFFICE O/P EST LOW 20 MIN: CPT | Mod: S$GLB,,, | Performed by: INTERNAL MEDICINE

## 2020-11-16 PROCEDURE — 1101F PT FALLS ASSESS-DOCD LE1/YR: CPT | Mod: S$GLB,,, | Performed by: INTERNAL MEDICINE

## 2020-11-16 PROCEDURE — 3288F FALL RISK ASSESSMENT DOCD: CPT | Mod: S$GLB,,, | Performed by: INTERNAL MEDICINE

## 2020-11-16 PROCEDURE — 3288F PR FALLS RISK ASSESSMENT DOCUMENTED: ICD-10-PCS | Mod: S$GLB,,, | Performed by: INTERNAL MEDICINE

## 2020-11-16 PROCEDURE — 3075F SYST BP GE 130 - 139MM HG: CPT | Mod: S$GLB,,, | Performed by: INTERNAL MEDICINE

## 2020-11-16 PROCEDURE — 3079F PR MOST RECENT DIASTOLIC BLOOD PRESSURE 80-89 MM HG: ICD-10-PCS | Mod: S$GLB,,, | Performed by: INTERNAL MEDICINE

## 2020-11-16 PROCEDURE — 1159F PR MEDICATION LIST DOCUMENTED IN MEDICAL RECORD: ICD-10-PCS | Mod: S$GLB,,, | Performed by: INTERNAL MEDICINE

## 2020-11-16 PROCEDURE — 3079F DIAST BP 80-89 MM HG: CPT | Mod: S$GLB,,, | Performed by: INTERNAL MEDICINE

## 2020-11-16 PROCEDURE — 1125F AMNT PAIN NOTED PAIN PRSNT: CPT | Mod: S$GLB,,, | Performed by: INTERNAL MEDICINE

## 2020-11-16 PROCEDURE — 1101F PR PT FALLS ASSESS DOC 0-1 FALLS W/OUT INJ PAST YR: ICD-10-PCS | Mod: S$GLB,,, | Performed by: INTERNAL MEDICINE

## 2020-11-16 PROCEDURE — 1125F PR PAIN SEVERITY QUANTIFIED, PAIN PRESENT: ICD-10-PCS | Mod: S$GLB,,, | Performed by: INTERNAL MEDICINE

## 2020-11-16 PROCEDURE — 3075F PR MOST RECENT SYSTOLIC BLOOD PRESS GE 130-139MM HG: ICD-10-PCS | Mod: S$GLB,,, | Performed by: INTERNAL MEDICINE

## 2020-11-16 PROCEDURE — 1159F MED LIST DOCD IN RCRD: CPT | Mod: S$GLB,,, | Performed by: INTERNAL MEDICINE

## 2020-11-16 PROCEDURE — 99213 PR OFFICE/OUTPT VISIT, EST, LEVL III, 20-29 MIN: ICD-10-PCS | Mod: S$GLB,,, | Performed by: INTERNAL MEDICINE

## 2020-11-16 RX ORDER — CLOTRIMAZOLE AND BETAMETHASONE DIPROPIONATE 10; .64 MG/G; MG/G
1 CREAM TOPICAL 2 TIMES DAILY
COMMUNITY
Start: 2020-09-09 | End: 2022-04-07

## 2020-11-16 NOTE — PROGRESS NOTES
[facility] RHEUMATOLOGY           Follow-up visit    Notes dictated to M*Modal. Please forgive any unintended errors.  Subjective:       Patient ID:   NAME: Kennedy Thomas : 1940     80 y.o. male    Referring Doc: No ref. provider found  Other Physicians:    Chief Complaint:  Chronic inflammatory arthritis and Gout      HPI/Interval History:  The patient is doing very well.  Has had no red, hot, and/or swollen joints.  He has not had any gout attacks since his prior visit.    ROS:   GEN:    no fever, night sweats or weight loss  SKIN:   no rashes, bruising, or swelling, no Raynauds, no photosensitivity  HEENT: no changes in vision, no mouth ulcers, no sicca symptoms, no scalp tenderness, no jaw claudication.  CV:      no CP, PND, PATTERSON, orthopnea, no palpitations  PULM: no SOB, cough, hemoptysis, sputum or pleuritic pain  GI:        no dysphagia, no GERD, no hematemesis, no abdominal pain, nausea, vomiting, constipation, diarrhea, melanotic stools, BRBPR  :      no hematuria, dysuria  NEURO: no paresthesias, headaches, acute visual disturbances  MUSCULOSKELETAL:  As above  PSYCH:   No increased insomnia, no increased anxiety, no increased depression    Past Medical/Surgical History:  Past Medical History:   Diagnosis Date    Arthritis     Atrial fibrillation     CKD (chronic kidney disease)     COPD (chronic obstructive pulmonary disease)     Diabetes mellitus, type 2     Gout     Heart block     Hypertension     Pancreatitis     Peripheral vascular disease      Past Surgical History:   Procedure Laterality Date    APPENDECTOMY      CARDIAC PACEMAKER PLACEMENT      EYE SURGERY      INGUINAL HERNIA REPAIR Left        Allergies:  Review of patient's allergies indicates:   Allergen Reactions    Statins-hmg-coa reductase inhibitors        Social/Family History:  Social History     Socioeconomic History    Marital status:      Spouse name: Not on file    Number of children: Not on  file    Years of education: Not on file    Highest education level: Not on file   Occupational History    Not on file   Social Needs    Financial resource strain: Not on file    Food insecurity     Worry: Not on file     Inability: Not on file    Transportation needs     Medical: Not on file     Non-medical: Not on file   Tobacco Use    Smoking status: Former Smoker    Smokeless tobacco: Never Used   Substance and Sexual Activity    Alcohol use: No    Drug use: No    Sexual activity: Not on file   Lifestyle    Physical activity     Days per week: Not on file     Minutes per session: Not on file    Stress: Not on file   Relationships    Social connections     Talks on phone: Not on file     Gets together: Not on file     Attends Muslim service: Not on file     Active member of club or organization: Not on file     Attends meetings of clubs or organizations: Not on file     Relationship status: Not on file   Other Topics Concern    Not on file   Social History Narrative    Not on file     Family History   Problem Relation Age of Onset    Diabetes Mellitus Mother      FAMILY HISTORY: negative for Connective Tissue Disease      Medications:    Current Outpatient Medications:     allopurinoL (ZYLOPRIM) 100 MG tablet, Take 1 tablet (100 mg total) by mouth once daily., Disp: 30 tablet, Rfl: 5    benazepril (LOTENSIN) 40 MG tablet, Take 40 mg by mouth., Disp: , Rfl:     bumetanide (BUMEX) 1 MG tablet, , Disp: , Rfl:     clotrimazole-betamethasone 1-0.05% (LOTRISONE) cream, MADDISON EXT AA QD, Disp: , Rfl:     donepeziL (ARICEPT) 5 MG tablet, , Disp: , Rfl:     ergocalciferol, vitamin D2, (VITAMIN D2 ORAL), Take 5,000 Units by mouth once daily., Disp: , Rfl:     hydrOXYchloroQUINE (PLAQUENIL) 200 mg tablet, Take 1 tablet (200 mg total) by mouth once daily., Disp: 90 tablet, Rfl: 1    metoprolol succinate (TOPROL-XL) 50 MG 24 hr tablet, , Disp: , Rfl:     potassium chloride (KLOR-CON) 10 MEQ TbSR, Take  10 mEq by mouth once daily., Disp: , Rfl:     SYMBICORT 160-4.5 mcg/actuation HFAA, as needed. , Disp: , Rfl: 0    terazosin (HYTRIN) 5 MG capsule, Take 5 mg by mouth., Disp: , Rfl:     warfarin (COUMADIN) 5 MG tablet, Take 7.5 mg by mouth. , Disp: , Rfl:       Objective:     Vitals:  Blood pressure 139/82, temperature 97.3 °F (36.3 °C), weight 112.8 kg (248 lb 11.2 oz).    Physical Examination:   GEN: wn/wd male in no apparent distress  SKIN: no rashes, no sclerodactyly, no Raynaud's, no periungual erythema, no digital tip ulcerations, no nailbed pitting  HEAD: no alopecia, no scalp tenderness, no temporal artery tenderness or induration.  EYES: no pallor, no icterus, PERRLA  ENT:  no thrush, no mucosal dryness or ulcerations, adequate oral hygiene & dentition.  NECK: supple x 6, no masses, no thyromegaly, no lymphadenopathy.  CV:   S1 and S2 regular, no murmurs, gallop or rubs  CHEST: Normal respiratory effort;  normal breath sounds/no adventitious sounds. No signs of consolidation.  ABD: non-tender and non-distended; soft; normal bowel sounds; no rebound/guarding or tenderness. No hepatosplenomegaly.  Musculoskeletal:  No evidence of active inflammation.  No progressive deformity.  No peripheral tophi    EXTREM: no clubbing, cyanosis or edema. normal pulses.  NEURO:  grossly intact; motor/sensory WNL; no tremors  PSYCH:  normal mood, affect and behavior    Labs:   Lab Results   Component Value Date    WBC 5.0 07/07/2020    HGB 12.2 (L) 07/07/2020    HCT 36.2 (L) 07/07/2020    MCV 92.8 07/07/2020     (L) 07/07/2020   CMP@  Sodium   Date Value Ref Range Status   07/07/2020 142 135 - 146 mmol/L Final   05/18/2017 141 134 - 144 mmol/L      Potassium   Date Value Ref Range Status   07/07/2020 4.3 3.5 - 5.3 mmol/L Final     Chloride   Date Value Ref Range Status   07/07/2020 103 98 - 110 mmol/L Final   05/18/2017 103 98 - 110 mmol/L      CO2   Date Value Ref Range Status   07/07/2020 34 (H) 20 - 32 mmol/L  Final     Glucose   Date Value Ref Range Status   07/07/2020 110 65 - 139 mg/dL Final     Comment:               Non-fasting reference interval        05/18/2017 99 70 - 99 mg/dL      BUN   Date Value Ref Range Status   07/07/2020 16 7 - 25 mg/dL Final     Creatinine   Date Value Ref Range Status   07/07/2020 0.88 0.70 - 1.18 mg/dL Final     Comment:     For patients >49 years of age, the reference limit  for Creatinine is approximately 13% higher for people  identified as -American.        05/18/2017 1.10 0.60 - 1.40 mg/dL      Calcium   Date Value Ref Range Status   07/07/2020 9.5 8.6 - 10.3 mg/dL Final     Total Protein   Date Value Ref Range Status   07/07/2020 6.6 6.1 - 8.1 g/dL Final     Albumin   Date Value Ref Range Status   07/07/2020 4.3 3.6 - 5.1 g/dL Final   05/18/2017 4.7 3.1 - 4.7 g/dL      Total Bilirubin   Date Value Ref Range Status   07/07/2020 1.0 0.2 - 1.2 mg/dL Final     Alkaline Phosphatase   Date Value Ref Range Status   07/07/2020 56 35 - 144 U/L Final     AST   Date Value Ref Range Status   07/07/2020 15 10 - 35 U/L Final     ALT   Date Value Ref Range Status   07/07/2020 13 9 - 46 U/L Final     CRP   Date Value Ref Range Status   07/07/2020 2.4 <8.0 mg/L Final     Rheumatoid Factor   Date Value Ref Range Status   01/18/2018 <14 <14 IU/mL Final     Uric Acid   Date Value Ref Range Status   09/03/2020 6.0 4.0 - 8.0 mg/dL Final     Comment:     Therapeutic target for gout patients: <6.0 mg/dL             Radiology/Diagnostic Studies:    None    Assessment/Discussion/Plan:   80 y.o. male with chronic inflammatory arthritis-good control hydroxychloroquine 200 mg daily  2) chronic gouty arthritis-stable on allopurinol 100 mg daily    PLAN:  I will continue his medications unchanged.  Follow-up blood testing will be performed in 3 months.    RTC:  I will see him back in 4 months.    Electronically signed by Piero Veronica MD

## 2021-02-09 LAB
ALBUMIN SERPL-MCNC: 4.3 G/DL (ref 3.6–5.1)
ALBUMIN/GLOB SERPL: 1.7 (CALC) (ref 1–2.5)
ALP SERPL-CCNC: 63 U/L (ref 35–144)
ALT SERPL-CCNC: 14 U/L (ref 9–46)
AST SERPL-CCNC: 16 U/L (ref 10–35)
BASOPHILS # BLD AUTO: 58 CELLS/UL (ref 0–200)
BASOPHILS NFR BLD AUTO: 1.1 %
BILIRUB DIRECT SERPL-MCNC: 0.3 MG/DL
BILIRUB INDIRECT SERPL-MCNC: 0.9 MG/DL (CALC) (ref 0.2–1.2)
BILIRUB SERPL-MCNC: 1.2 MG/DL (ref 0.2–1.2)
BUN SERPL-MCNC: 15 MG/DL (ref 7–25)
BUN/CREAT SERPL: ABNORMAL (CALC) (ref 6–22)
CALCIUM SERPL-MCNC: 9.6 MG/DL (ref 8.6–10.3)
CHLORIDE SERPL-SCNC: 102 MMOL/L (ref 98–110)
CO2 SERPL-SCNC: 33 MMOL/L (ref 20–32)
CREAT SERPL-MCNC: 0.94 MG/DL (ref 0.7–1.18)
CRP SERPL-MCNC: 2.8 MG/L
EOSINOPHIL # BLD AUTO: 138 CELLS/UL (ref 15–500)
EOSINOPHIL NFR BLD AUTO: 2.6 %
ERYTHROCYTE [DISTWIDTH] IN BLOOD BY AUTOMATED COUNT: 12.8 % (ref 11–15)
GFRSERPLBLD MDRD-ARVRAT: 77 ML/MIN/1.73M2
GLOBULIN SER CALC-MCNC: 2.6 G/DL (CALC) (ref 1.9–3.7)
GLUCOSE SERPL-MCNC: 111 MG/DL (ref 65–139)
HCT VFR BLD AUTO: 37.1 % (ref 38.5–50)
HGB BLD-MCNC: 12.5 G/DL (ref 13.2–17.1)
LYMPHOCYTES # BLD AUTO: 1537 CELLS/UL (ref 850–3900)
LYMPHOCYTES NFR BLD AUTO: 29 %
MCH RBC QN AUTO: 31.7 PG (ref 27–33)
MCHC RBC AUTO-ENTMCNC: 33.7 G/DL (ref 32–36)
MCV RBC AUTO: 94.2 FL (ref 80–100)
MONOCYTES # BLD AUTO: 419 CELLS/UL (ref 200–950)
MONOCYTES NFR BLD AUTO: 7.9 %
NEUTROPHILS # BLD AUTO: 3148 CELLS/UL (ref 1500–7800)
NEUTROPHILS NFR BLD AUTO: 59.4 %
PLATELET # BLD AUTO: 109 THOUSAND/UL (ref 140–400)
PMV BLD REES-ECKER: 11 FL (ref 7.5–12.5)
POTASSIUM SERPL-SCNC: 4.1 MMOL/L (ref 3.5–5.3)
PROT SERPL-MCNC: 6.9 G/DL (ref 6.1–8.1)
RBC # BLD AUTO: 3.94 MILLION/UL (ref 4.2–5.8)
SODIUM SERPL-SCNC: 143 MMOL/L (ref 135–146)
URATE SERPL-MCNC: 6.3 MG/DL (ref 4–8)
WBC # BLD AUTO: 5.3 THOUSAND/UL (ref 3.8–10.8)

## 2021-03-23 ENCOUNTER — OFFICE VISIT (OUTPATIENT)
Dept: RHEUMATOLOGY | Facility: CLINIC | Age: 81
End: 2021-03-23
Payer: MEDICARE

## 2021-03-23 VITALS
BODY MASS INDEX: 32.28 KG/M2 | DIASTOLIC BLOOD PRESSURE: 88 MMHG | WEIGHT: 244.69 LBS | SYSTOLIC BLOOD PRESSURE: 141 MMHG

## 2021-03-23 DIAGNOSIS — M10.9 GOUT, UNSPECIFIED CAUSE, UNSPECIFIED CHRONICITY, UNSPECIFIED SITE: ICD-10-CM

## 2021-03-23 DIAGNOSIS — Z79.899 ENCOUNTER FOR LONG-TERM (CURRENT) DRUG USE: ICD-10-CM

## 2021-03-23 DIAGNOSIS — M05.79 RHEUMATOID ARTHRITIS INVOLVING MULTIPLE SITES WITH POSITIVE RHEUMATOID FACTOR: Primary | ICD-10-CM

## 2021-03-23 PROCEDURE — 1159F MED LIST DOCD IN RCRD: CPT | Mod: S$GLB,,, | Performed by: INTERNAL MEDICINE

## 2021-03-23 PROCEDURE — 1125F AMNT PAIN NOTED PAIN PRSNT: CPT | Mod: S$GLB,,, | Performed by: INTERNAL MEDICINE

## 2021-03-23 PROCEDURE — 3288F PR FALLS RISK ASSESSMENT DOCUMENTED: ICD-10-PCS | Mod: S$GLB,,, | Performed by: INTERNAL MEDICINE

## 2021-03-23 PROCEDURE — 1125F PR PAIN SEVERITY QUANTIFIED, PAIN PRESENT: ICD-10-PCS | Mod: S$GLB,,, | Performed by: INTERNAL MEDICINE

## 2021-03-23 PROCEDURE — 3077F SYST BP >= 140 MM HG: CPT | Mod: S$GLB,,, | Performed by: INTERNAL MEDICINE

## 2021-03-23 PROCEDURE — 3079F PR MOST RECENT DIASTOLIC BLOOD PRESSURE 80-89 MM HG: ICD-10-PCS | Mod: S$GLB,,, | Performed by: INTERNAL MEDICINE

## 2021-03-23 PROCEDURE — 3288F FALL RISK ASSESSMENT DOCD: CPT | Mod: S$GLB,,, | Performed by: INTERNAL MEDICINE

## 2021-03-23 PROCEDURE — 99213 OFFICE O/P EST LOW 20 MIN: CPT | Mod: S$GLB,,, | Performed by: INTERNAL MEDICINE

## 2021-03-23 PROCEDURE — 99213 PR OFFICE/OUTPT VISIT, EST, LEVL III, 20-29 MIN: ICD-10-PCS | Mod: S$GLB,,, | Performed by: INTERNAL MEDICINE

## 2021-03-23 PROCEDURE — 1101F PT FALLS ASSESS-DOCD LE1/YR: CPT | Mod: S$GLB,,, | Performed by: INTERNAL MEDICINE

## 2021-03-23 PROCEDURE — 3079F DIAST BP 80-89 MM HG: CPT | Mod: S$GLB,,, | Performed by: INTERNAL MEDICINE

## 2021-03-23 PROCEDURE — 1101F PR PT FALLS ASSESS DOC 0-1 FALLS W/OUT INJ PAST YR: ICD-10-PCS | Mod: S$GLB,,, | Performed by: INTERNAL MEDICINE

## 2021-03-23 PROCEDURE — 1159F PR MEDICATION LIST DOCUMENTED IN MEDICAL RECORD: ICD-10-PCS | Mod: S$GLB,,, | Performed by: INTERNAL MEDICINE

## 2021-03-23 PROCEDURE — 3077F PR MOST RECENT SYSTOLIC BLOOD PRESSURE >= 140 MM HG: ICD-10-PCS | Mod: S$GLB,,, | Performed by: INTERNAL MEDICINE

## 2021-04-07 DIAGNOSIS — M05.79 RHEUMATOID ARTHRITIS INVOLVING MULTIPLE SITES WITH POSITIVE RHEUMATOID FACTOR: Primary | ICD-10-CM

## 2021-04-07 DIAGNOSIS — M19.90 CHRONIC INFLAMMATORY ARTHRITIS: ICD-10-CM

## 2021-04-07 RX ORDER — HYDROXYCHLOROQUINE SULFATE 200 MG/1
200 TABLET, FILM COATED ORAL DAILY
Qty: 90 TABLET | Refills: 1 | Status: SHIPPED | OUTPATIENT
Start: 2021-04-07 | End: 2021-10-25

## 2021-06-15 LAB
BASOPHILS # BLD AUTO: 72 CELLS/UL (ref 0–200)
BASOPHILS NFR BLD AUTO: 1.3 %
BUN SERPL-MCNC: 21 MG/DL (ref 7–25)
BUN/CREAT SERPL: ABNORMAL (CALC) (ref 6–22)
CALCIUM SERPL-MCNC: 9.7 MG/DL (ref 8.6–10.3)
CHLORIDE SERPL-SCNC: 102 MMOL/L (ref 98–110)
CO2 SERPL-SCNC: 35 MMOL/L (ref 20–32)
CREAT SERPL-MCNC: 0.99 MG/DL (ref 0.7–1.18)
CRP SERPL-MCNC: 2.2 MG/L
EOSINOPHIL # BLD AUTO: 132 CELLS/UL (ref 15–500)
EOSINOPHIL NFR BLD AUTO: 2.4 %
ERYTHROCYTE [DISTWIDTH] IN BLOOD BY AUTOMATED COUNT: 12.7 % (ref 11–15)
GLUCOSE SERPL-MCNC: 126 MG/DL (ref 65–139)
HCT VFR BLD AUTO: 40.8 % (ref 38.5–50)
HGB BLD-MCNC: 13.6 G/DL (ref 13.2–17.1)
LYMPHOCYTES # BLD AUTO: 1524 CELLS/UL (ref 850–3900)
LYMPHOCYTES NFR BLD AUTO: 27.7 %
MCH RBC QN AUTO: 30.2 PG (ref 27–33)
MCHC RBC AUTO-ENTMCNC: 33.3 G/DL (ref 32–36)
MCV RBC AUTO: 90.7 FL (ref 80–100)
MONOCYTES # BLD AUTO: 517 CELLS/UL (ref 200–950)
MONOCYTES NFR BLD AUTO: 9.4 %
NEUTROPHILS # BLD AUTO: 3256 CELLS/UL (ref 1500–7800)
NEUTROPHILS NFR BLD AUTO: 59.2 %
PLATELET # BLD AUTO: 121 THOUSAND/UL (ref 140–400)
PMV BLD REES-ECKER: 11.5 FL (ref 7.5–12.5)
POTASSIUM SERPL-SCNC: 4.3 MMOL/L (ref 3.5–5.3)
RBC # BLD AUTO: 4.5 MILLION/UL (ref 4.2–5.8)
SERVICE CMNT-IMP: ABNORMAL
SODIUM SERPL-SCNC: 143 MMOL/L (ref 135–146)
URATE SERPL-MCNC: 6.2 MG/DL (ref 4–8)
WBC # BLD AUTO: 5.5 THOUSAND/UL (ref 3.8–10.8)

## 2021-07-27 ENCOUNTER — OFFICE VISIT (OUTPATIENT)
Dept: RHEUMATOLOGY | Facility: CLINIC | Age: 81
End: 2021-07-27
Payer: MEDICARE

## 2021-07-27 VITALS
BODY MASS INDEX: 31.55 KG/M2 | SYSTOLIC BLOOD PRESSURE: 139 MMHG | WEIGHT: 239.13 LBS | DIASTOLIC BLOOD PRESSURE: 84 MMHG

## 2021-07-27 DIAGNOSIS — M05.79 RHEUMATOID ARTHRITIS INVOLVING MULTIPLE SITES WITH POSITIVE RHEUMATOID FACTOR: Primary | ICD-10-CM

## 2021-07-27 DIAGNOSIS — M10.9 GOUT, UNSPECIFIED CAUSE, UNSPECIFIED CHRONICITY, UNSPECIFIED SITE: ICD-10-CM

## 2021-07-27 DIAGNOSIS — Z79.899 ENCOUNTER FOR LONG-TERM (CURRENT) DRUG USE: ICD-10-CM

## 2021-07-27 PROCEDURE — 1160F PR REVIEW ALL MEDS BY PRESCRIBER/CLIN PHARMACIST DOCUMENTED: ICD-10-PCS | Mod: S$GLB,,, | Performed by: INTERNAL MEDICINE

## 2021-07-27 PROCEDURE — 1159F MED LIST DOCD IN RCRD: CPT | Mod: S$GLB,,, | Performed by: INTERNAL MEDICINE

## 2021-07-27 PROCEDURE — 1159F PR MEDICATION LIST DOCUMENTED IN MEDICAL RECORD: ICD-10-PCS | Mod: S$GLB,,, | Performed by: INTERNAL MEDICINE

## 2021-07-27 PROCEDURE — 1125F AMNT PAIN NOTED PAIN PRSNT: CPT | Mod: S$GLB,,, | Performed by: INTERNAL MEDICINE

## 2021-07-27 PROCEDURE — 99213 OFFICE O/P EST LOW 20 MIN: CPT | Mod: S$GLB,,, | Performed by: INTERNAL MEDICINE

## 2021-07-27 PROCEDURE — 99213 PR OFFICE/OUTPT VISIT, EST, LEVL III, 20-29 MIN: ICD-10-PCS | Mod: S$GLB,,, | Performed by: INTERNAL MEDICINE

## 2021-07-27 PROCEDURE — 1125F PR PAIN SEVERITY QUANTIFIED, PAIN PRESENT: ICD-10-PCS | Mod: S$GLB,,, | Performed by: INTERNAL MEDICINE

## 2021-07-27 PROCEDURE — 1160F RVW MEDS BY RX/DR IN RCRD: CPT | Mod: S$GLB,,, | Performed by: INTERNAL MEDICINE

## 2021-07-27 PROCEDURE — 3288F PR FALLS RISK ASSESSMENT DOCUMENTED: ICD-10-PCS | Mod: S$GLB,,, | Performed by: INTERNAL MEDICINE

## 2021-07-27 PROCEDURE — 1101F PR PT FALLS ASSESS DOC 0-1 FALLS W/OUT INJ PAST YR: ICD-10-PCS | Mod: S$GLB,,, | Performed by: INTERNAL MEDICINE

## 2021-07-27 PROCEDURE — 3288F FALL RISK ASSESSMENT DOCD: CPT | Mod: S$GLB,,, | Performed by: INTERNAL MEDICINE

## 2021-07-27 PROCEDURE — 1101F PT FALLS ASSESS-DOCD LE1/YR: CPT | Mod: S$GLB,,, | Performed by: INTERNAL MEDICINE

## 2022-01-27 ENCOUNTER — OFFICE VISIT (OUTPATIENT)
Dept: RHEUMATOLOGY | Facility: CLINIC | Age: 82
End: 2022-01-27
Payer: MEDICARE

## 2022-01-27 VITALS — BODY MASS INDEX: 30.87 KG/M2 | WEIGHT: 234 LBS | DIASTOLIC BLOOD PRESSURE: 86 MMHG | SYSTOLIC BLOOD PRESSURE: 139 MMHG

## 2022-01-27 DIAGNOSIS — Z79.899 ENCOUNTER FOR LONG-TERM (CURRENT) DRUG USE: ICD-10-CM

## 2022-01-27 DIAGNOSIS — M05.79 RHEUMATOID ARTHRITIS INVOLVING MULTIPLE SITES WITH POSITIVE RHEUMATOID FACTOR: Primary | ICD-10-CM

## 2022-01-27 DIAGNOSIS — M10.9 GOUT, UNSPECIFIED CAUSE, UNSPECIFIED CHRONICITY, UNSPECIFIED SITE: ICD-10-CM

## 2022-01-27 PROCEDURE — 3288F FALL RISK ASSESSMENT DOCD: CPT | Mod: S$GLB,,, | Performed by: INTERNAL MEDICINE

## 2022-01-27 PROCEDURE — 1159F MED LIST DOCD IN RCRD: CPT | Mod: S$GLB,,, | Performed by: INTERNAL MEDICINE

## 2022-01-27 PROCEDURE — 3079F PR MOST RECENT DIASTOLIC BLOOD PRESSURE 80-89 MM HG: ICD-10-PCS | Mod: S$GLB,,, | Performed by: INTERNAL MEDICINE

## 2022-01-27 PROCEDURE — 1101F PR PT FALLS ASSESS DOC 0-1 FALLS W/OUT INJ PAST YR: ICD-10-PCS | Mod: S$GLB,,, | Performed by: INTERNAL MEDICINE

## 2022-01-27 PROCEDURE — 1125F AMNT PAIN NOTED PAIN PRSNT: CPT | Mod: S$GLB,,, | Performed by: INTERNAL MEDICINE

## 2022-01-27 PROCEDURE — 1159F PR MEDICATION LIST DOCUMENTED IN MEDICAL RECORD: ICD-10-PCS | Mod: S$GLB,,, | Performed by: INTERNAL MEDICINE

## 2022-01-27 PROCEDURE — 1160F RVW MEDS BY RX/DR IN RCRD: CPT | Mod: S$GLB,,, | Performed by: INTERNAL MEDICINE

## 2022-01-27 PROCEDURE — 1101F PT FALLS ASSESS-DOCD LE1/YR: CPT | Mod: S$GLB,,, | Performed by: INTERNAL MEDICINE

## 2022-01-27 PROCEDURE — 99213 OFFICE O/P EST LOW 20 MIN: CPT | Mod: S$GLB,,, | Performed by: INTERNAL MEDICINE

## 2022-01-27 PROCEDURE — 99213 PR OFFICE/OUTPT VISIT, EST, LEVL III, 20-29 MIN: ICD-10-PCS | Mod: S$GLB,,, | Performed by: INTERNAL MEDICINE

## 2022-01-27 PROCEDURE — 1160F PR REVIEW ALL MEDS BY PRESCRIBER/CLIN PHARMACIST DOCUMENTED: ICD-10-PCS | Mod: S$GLB,,, | Performed by: INTERNAL MEDICINE

## 2022-01-27 PROCEDURE — 3075F PR MOST RECENT SYSTOLIC BLOOD PRESS GE 130-139MM HG: ICD-10-PCS | Mod: S$GLB,,, | Performed by: INTERNAL MEDICINE

## 2022-01-27 PROCEDURE — 3075F SYST BP GE 130 - 139MM HG: CPT | Mod: S$GLB,,, | Performed by: INTERNAL MEDICINE

## 2022-01-27 PROCEDURE — 1125F PR PAIN SEVERITY QUANTIFIED, PAIN PRESENT: ICD-10-PCS | Mod: S$GLB,,, | Performed by: INTERNAL MEDICINE

## 2022-01-27 PROCEDURE — 3079F DIAST BP 80-89 MM HG: CPT | Mod: S$GLB,,, | Performed by: INTERNAL MEDICINE

## 2022-01-27 PROCEDURE — 3288F PR FALLS RISK ASSESSMENT DOCUMENTED: ICD-10-PCS | Mod: S$GLB,,, | Performed by: INTERNAL MEDICINE

## 2022-01-27 NOTE — PROGRESS NOTES
[facility] RHEUMATOLOGY           Follow-up visit    Notes dictated to M*Modal. Please forgive any unintended errors.  Subjective:       Patient ID:   NAME: Kennedy Thomas : 1940     81 y.o. male    Referring Doc: No ref. provider found  Other Physicians:    Chief Complaint:  Rheumatoid Arthritis      HPI/Interval History:  The patient is doing well.  He has had no issues of any red, hot, and/or swollen joints.    ROS:   GEN:    no fever, night sweats or weight loss  SKIN:   no rashes, bruising, or swelling, no Raynauds, no photosensitivity  HEENT: no changes in vision, no mouth ulcers, no sicca symptoms, no scalp tenderness, no jaw claudication.  CV:      no CP, PND, PATTERSON, orthopnea, no palpitations  PULM: no SOB, cough, hemoptysis, sputum or pleuritic pain  GI:        no dysphagia, no GERD, no hematemesis, no abdominal pain, nausea, vomiting, constipation, diarrhea, melanotic stools, BRBPR  :      no hematuria, dysuria  NEURO: no paresthesias, headaches, acute visual disturbances  MUSCULOSKELETAL:  As above  PSYCH:   No increased insomnia, no increased anxiety, no increased depression    Past Medical/Surgical History:  Past Medical History:   Diagnosis Date    Arthritis     Atrial fibrillation     CKD (chronic kidney disease)     COPD (chronic obstructive pulmonary disease)     Diabetes mellitus, type 2     Gout     Heart block     Hypertension     Pancreatitis     Peripheral vascular disease      Past Surgical History:   Procedure Laterality Date    APPENDECTOMY      CARDIAC PACEMAKER PLACEMENT  2010    EYE SURGERY      INGUINAL HERNIA REPAIR Left        Allergies:  Review of patient's allergies indicates:   Allergen Reactions    Statins-hmg-coa reductase inhibitors        Social/Family History:  Social History     Socioeconomic History    Marital status:    Tobacco Use    Smoking status: Former Smoker    Smokeless tobacco: Never Used   Substance and Sexual Activity    Alcohol  use: No    Drug use: No     Family History   Problem Relation Age of Onset    Diabetes Mellitus Mother      FAMILY HISTORY: negative for Connective Tissue Disease      Medications:    Current Outpatient Medications:     allopurinoL (ZYLOPRIM) 100 MG tablet, TAKE 1 TABLET(100 MG) BY MOUTH EVERY DAY, Disp: 90 tablet, Rfl: 3    benazepril (LOTENSIN) 40 MG tablet, Take 40 mg by mouth., Disp: , Rfl:     bumetanide (BUMEX) 1 MG tablet, , Disp: , Rfl:     clotrimazole-betamethasone 1-0.05% (LOTRISONE) cream, MADDISON EXT AA QD, Disp: , Rfl:     donepeziL (ARICEPT) 5 MG tablet, , Disp: , Rfl:     ergocalciferol, vitamin D2, (VITAMIN D2 ORAL), Take 5,000 Units by mouth once daily., Disp: , Rfl:     hydrOXYchloroQUINE (PLAQUENIL) 200 mg tablet, TAKE 1 TABLET(200 MG) BY MOUTH EVERY DAY, Disp: 90 tablet, Rfl: 1    metoprolol succinate (TOPROL-XL) 50 MG 24 hr tablet, , Disp: , Rfl:     potassium chloride (KLOR-CON) 10 MEQ TbSR, Take 10 mEq by mouth once daily., Disp: , Rfl:     SYMBICORT 160-4.5 mcg/actuation HFAA, as needed. , Disp: , Rfl: 0    terazosin (HYTRIN) 5 MG capsule, Take 5 mg by mouth., Disp: , Rfl:     warfarin (COUMADIN) 5 MG tablet, Take 7.5 mg by mouth. , Disp: , Rfl:       Objective:     Vitals:  Blood pressure 139/86, weight 106.1 kg (234 lb).    Physical Examination:   GEN: wn/wd male in no apparent distress  SKIN: no rashes, no sclerodactyly, no Raynaud's, no periungual erythema, no digital tip ulcerations, no nailbed pitting  HEAD: no alopecia, no scalp tenderness, no temporal artery tenderness or induration.  EYES: no pallor, no icterus, PERRLA  ENT:  no thrush, no mucosal dryness or ulcerations, adequate oral hygiene & dentition.  NECK: supple x 6, no masses, no thyromegaly, no lymphadenopathy.  CV:   S1 and S2 regular, no murmurs, gallop or rubs  CHEST: Normal respiratory effort;  normal breath sounds/no adventitious sounds. No signs of consolidation.  ABD: non-tender and non-distended; soft;  normal bowel sounds; no rebound/guarding or tenderness. No hepatosplenomegaly.  Musculoskeletal:  No evidence of active synovitis.  No progressive deformity  EXTREM: no clubbing, cyanosis or edema. normal pulses.  NEURO:  grossly intact; motor/sensory WNL; no tremors  PSYCH:  normal mood, affect and behavior    Labs:   Lab Results   Component Value Date    WBC 5.5 06/14/2021    HGB 13.6 06/14/2021    HCT 40.8 06/14/2021    MCV 90.7 06/14/2021     (L) 06/14/2021   CMP@  Sodium   Date Value Ref Range Status   06/14/2021 143 135 - 146 mmol/L Final   05/18/2017 141 134 - 144 mmol/L      Potassium   Date Value Ref Range Status   06/14/2021 4.3 3.5 - 5.3 mmol/L Final     Chloride   Date Value Ref Range Status   06/14/2021 102 98 - 110 mmol/L Final   05/18/2017 103 98 - 110 mmol/L      CO2   Date Value Ref Range Status   06/14/2021 35 (H) 20 - 32 mmol/L Final     Glucose   Date Value Ref Range Status   06/14/2021 126 65 - 139 mg/dL Final     Comment:               Non-fasting reference interval        05/18/2017 99 70 - 99 mg/dL      BUN   Date Value Ref Range Status   06/14/2021 21 7 - 25 mg/dL Final     Creatinine   Date Value Ref Range Status   06/14/2021 0.99 0.70 - 1.18 mg/dL Final     Comment:     For patients >49 years of age, the reference limit  for Creatinine is approximately 13% higher for people  identified as -American.        05/18/2017 1.10 0.60 - 1.40 mg/dL      Calcium   Date Value Ref Range Status   06/14/2021 9.7 8.6 - 10.3 mg/dL Final     Total Protein   Date Value Ref Range Status   02/08/2021 6.9 6.1 - 8.1 g/dL Final     Albumin   Date Value Ref Range Status   02/08/2021 4.3 3.6 - 5.1 g/dL Final   05/18/2017 4.7 3.1 - 4.7 g/dL      Total Bilirubin   Date Value Ref Range Status   02/08/2021 1.2 0.2 - 1.2 mg/dL Final     Alkaline Phosphatase   Date Value Ref Range Status   07/07/2020 56 35 - 144 U/L Final     AST   Date Value Ref Range Status   02/08/2021 16 10 - 35 U/L Final     ALT    Date Value Ref Range Status   02/08/2021 14 9 - 46 U/L Final     CRP   Date Value Ref Range Status   06/14/2021 2.2 <8.0 mg/L Final     Rheumatoid Factor   Date Value Ref Range Status   01/18/2018 <14 <14 IU/mL Final     Uric Acid   Date Value Ref Range Status   06/14/2021 6.2 4.0 - 8.0 mg/dL Final     Comment:     Therapeutic target for gout patients: <6.0 mg/dL             Radiology/Diagnostic Studies:    None    Assessment/Discussion/Plan:   81 y.o. male with chronic inflammatory arthritis-good control on Plaquenil 200 mg daily  2) gout-good control on allopurinol 100 mg daily    PLAN:  I will continue his medication without change.  Routine blood testing will be done in 3 months.    RTC:  I will see her back in 6 months        Electronically signed by Piero Veronica MD

## 2022-04-07 ENCOUNTER — HOSPITAL ENCOUNTER (OUTPATIENT)
Facility: HOSPITAL | Age: 82
Discharge: HOME | End: 2022-04-08
Attending: EMERGENCY MEDICINE | Admitting: INTERNAL MEDICINE
Payer: MEDICARE

## 2022-04-07 DIAGNOSIS — R07.9 CHEST PAIN: ICD-10-CM

## 2022-04-07 PROBLEM — R69 MULTIPLE CHRONIC DISEASES: Status: ACTIVE | Noted: 2022-04-07

## 2022-04-07 PROBLEM — R14.3 FLATULENCE: Status: ACTIVE | Noted: 2022-04-07

## 2022-04-07 PROBLEM — D61.818 PANCYTOPENIA: Status: ACTIVE | Noted: 2022-04-07

## 2022-04-07 LAB
ALBUMIN SERPL BCP-MCNC: 4.4 G/DL (ref 3.5–5.2)
ALP SERPL-CCNC: 64 U/L (ref 55–135)
ALT SERPL W/O P-5'-P-CCNC: 23 U/L (ref 10–44)
ANION GAP SERPL CALC-SCNC: 11 MMOL/L (ref 8–16)
AST SERPL-CCNC: 33 U/L (ref 10–40)
BASOPHILS # BLD AUTO: 0.05 K/UL (ref 0–0.2)
BASOPHILS NFR BLD: 1 % (ref 0–1.9)
BILIRUB SERPL-MCNC: 1.3 MG/DL (ref 0.1–1)
BILIRUB UR QL STRIP: NEGATIVE
BNP SERPL-MCNC: 189 PG/ML (ref 0–99)
BUN SERPL-MCNC: 19 MG/DL (ref 8–23)
CALCIUM SERPL-MCNC: 9.3 MG/DL (ref 8.7–10.5)
CHLORIDE SERPL-SCNC: 100 MMOL/L (ref 95–110)
CLARITY UR: CLEAR
CO2 SERPL-SCNC: 30 MMOL/L (ref 23–29)
COLOR UR: YELLOW
CREAT SERPL-MCNC: 0.8 MG/DL (ref 0.5–1.4)
DIFFERENTIAL METHOD: ABNORMAL
EOSINOPHIL # BLD AUTO: 0.1 K/UL (ref 0–0.5)
EOSINOPHIL NFR BLD: 2.7 % (ref 0–8)
ERYTHROCYTE [DISTWIDTH] IN BLOOD BY AUTOMATED COUNT: 13 % (ref 11.5–14.5)
EST. GFR  (AFRICAN AMERICAN): >60 ML/MIN/1.73 M^2
EST. GFR  (NON AFRICAN AMERICAN): >60 ML/MIN/1.73 M^2
GLUCOSE SERPL-MCNC: 100 MG/DL (ref 70–110)
GLUCOSE UR QL STRIP: NEGATIVE
HCT VFR BLD AUTO: 38.9 % (ref 40–54)
HGB BLD-MCNC: 13.5 G/DL (ref 14–18)
HGB UR QL STRIP: NEGATIVE
IMM GRANULOCYTES # BLD AUTO: 0.02 K/UL (ref 0–0.04)
IMM GRANULOCYTES NFR BLD AUTO: 0.4 % (ref 0–0.5)
KETONES UR QL STRIP: NEGATIVE
LEUKOCYTE ESTERASE UR QL STRIP: NEGATIVE
LIPASE SERPL-CCNC: 34 U/L (ref 4–60)
LYMPHOCYTES # BLD AUTO: 1.8 K/UL (ref 1–4.8)
LYMPHOCYTES NFR BLD: 35.3 % (ref 18–48)
MCH RBC QN AUTO: 31.2 PG (ref 27–31)
MCHC RBC AUTO-ENTMCNC: 34.7 G/DL (ref 32–36)
MCV RBC AUTO: 90 FL (ref 82–98)
MONOCYTES # BLD AUTO: 0.5 K/UL (ref 0.3–1)
MONOCYTES NFR BLD: 9.9 % (ref 4–15)
NEUTROPHILS # BLD AUTO: 2.6 K/UL (ref 1.8–7.7)
NEUTROPHILS NFR BLD: 50.7 % (ref 38–73)
NITRITE UR QL STRIP: NEGATIVE
NRBC BLD-RTO: 0 /100 WBC
PH UR STRIP: 6 [PH] (ref 5–8)
PLATELET # BLD AUTO: 100 K/UL (ref 150–450)
PMV BLD AUTO: 11 FL (ref 9.2–12.9)
POTASSIUM SERPL-SCNC: 3.7 MMOL/L (ref 3.5–5.1)
PROT SERPL-MCNC: 7.3 G/DL (ref 6–8.4)
PROT UR QL STRIP: NEGATIVE
RBC # BLD AUTO: 4.33 M/UL (ref 4.6–6.2)
SARS-COV-2 RDRP RESP QL NAA+PROBE: NEGATIVE
SODIUM SERPL-SCNC: 141 MMOL/L (ref 136–145)
SP GR UR STRIP: 1.02 (ref 1–1.03)
TROPONIN I SERPL DL<=0.01 NG/ML-MCNC: <0.03 NG/ML
URN SPEC COLLECT METH UR: NORMAL
UROBILINOGEN UR STRIP-ACNC: NEGATIVE EU/DL
WBC # BLD AUTO: 5.15 K/UL (ref 3.9–12.7)

## 2022-04-07 PROCEDURE — 25000003 PHARM REV CODE 250: Performed by: PHYSICIAN ASSISTANT

## 2022-04-07 PROCEDURE — 84484 ASSAY OF TROPONIN QUANT: CPT | Performed by: PHYSICIAN ASSISTANT

## 2022-04-07 PROCEDURE — G0378 HOSPITAL OBSERVATION PER HR: HCPCS

## 2022-04-07 PROCEDURE — 93005 ELECTROCARDIOGRAM TRACING: CPT | Performed by: INTERNAL MEDICINE

## 2022-04-07 PROCEDURE — 99285 EMERGENCY DEPT VISIT HI MDM: CPT | Mod: 25

## 2022-04-07 PROCEDURE — 63600175 PHARM REV CODE 636 W HCPCS: Performed by: STUDENT IN AN ORGANIZED HEALTH CARE EDUCATION/TRAINING PROGRAM

## 2022-04-07 PROCEDURE — U0002 COVID-19 LAB TEST NON-CDC: HCPCS | Performed by: NURSE PRACTITIONER

## 2022-04-07 PROCEDURE — 80053 COMPREHEN METABOLIC PANEL: CPT | Performed by: PHYSICIAN ASSISTANT

## 2022-04-07 PROCEDURE — 36415 COLL VENOUS BLD VENIPUNCTURE: CPT | Performed by: PHYSICIAN ASSISTANT

## 2022-04-07 PROCEDURE — 81003 URINALYSIS AUTO W/O SCOPE: CPT | Performed by: PHYSICIAN ASSISTANT

## 2022-04-07 PROCEDURE — 85025 COMPLETE CBC W/AUTO DIFF WBC: CPT | Performed by: PHYSICIAN ASSISTANT

## 2022-04-07 PROCEDURE — 96374 THER/PROPH/DIAG INJ IV PUSH: CPT

## 2022-04-07 PROCEDURE — 83880 ASSAY OF NATRIURETIC PEPTIDE: CPT | Performed by: PHYSICIAN ASSISTANT

## 2022-04-07 PROCEDURE — 93010 ELECTROCARDIOGRAM REPORT: CPT | Mod: ,,, | Performed by: INTERNAL MEDICINE

## 2022-04-07 PROCEDURE — 83690 ASSAY OF LIPASE: CPT | Performed by: PHYSICIAN ASSISTANT

## 2022-04-07 PROCEDURE — 93010 EKG 12-LEAD: ICD-10-PCS | Mod: ,,, | Performed by: INTERNAL MEDICINE

## 2022-04-07 PROCEDURE — 25000003 PHARM REV CODE 250: Performed by: STUDENT IN AN ORGANIZED HEALTH CARE EDUCATION/TRAINING PROGRAM

## 2022-04-07 RX ORDER — ONDANSETRON 2 MG/ML
4 INJECTION INTRAMUSCULAR; INTRAVENOUS EVERY 8 HOURS PRN
Status: DISCONTINUED | OUTPATIENT
Start: 2022-04-07 | End: 2022-04-08 | Stop reason: HOSPADM

## 2022-04-07 RX ORDER — TALC
6 POWDER (GRAM) TOPICAL NIGHTLY PRN
Status: DISCONTINUED | OUTPATIENT
Start: 2022-04-07 | End: 2022-04-08 | Stop reason: HOSPADM

## 2022-04-07 RX ORDER — METOPROLOL SUCCINATE 25 MG/1
25 TABLET, EXTENDED RELEASE ORAL 2 TIMES DAILY
Status: DISCONTINUED | OUTPATIENT
Start: 2022-04-08 | End: 2022-04-08 | Stop reason: HOSPADM

## 2022-04-07 RX ORDER — MAGNESIUM SULFATE HEPTAHYDRATE 40 MG/ML
2 INJECTION, SOLUTION INTRAVENOUS
Status: DISCONTINUED | OUTPATIENT
Start: 2022-04-07 | End: 2022-04-08 | Stop reason: HOSPADM

## 2022-04-07 RX ORDER — ACETAMINOPHEN 325 MG/1
650 TABLET ORAL EVERY 4 HOURS PRN
Status: DISCONTINUED | OUTPATIENT
Start: 2022-04-07 | End: 2022-04-08 | Stop reason: HOSPADM

## 2022-04-07 RX ORDER — POLYETHYLENE GLYCOL 3350 17 G/17G
17 POWDER, FOR SOLUTION ORAL 2 TIMES DAILY PRN
Status: DISCONTINUED | OUTPATIENT
Start: 2022-04-07 | End: 2022-04-08 | Stop reason: HOSPADM

## 2022-04-07 RX ORDER — NALOXONE HCL 0.4 MG/ML
0.02 VIAL (ML) INJECTION
Status: DISCONTINUED | OUTPATIENT
Start: 2022-04-07 | End: 2022-04-08 | Stop reason: HOSPADM

## 2022-04-07 RX ORDER — SODIUM CHLORIDE 0.9 % (FLUSH) 0.9 %
10 SYRINGE (ML) INJECTION EVERY 12 HOURS PRN
Status: DISCONTINUED | OUTPATIENT
Start: 2022-04-07 | End: 2022-04-08 | Stop reason: HOSPADM

## 2022-04-07 RX ORDER — WARFARIN 2.5 MG/1
7.5 TABLET ORAL DAILY
Status: DISCONTINUED | OUTPATIENT
Start: 2022-04-08 | End: 2022-04-08 | Stop reason: HOSPADM

## 2022-04-07 RX ORDER — ONDANSETRON 2 MG/ML
4 INJECTION INTRAMUSCULAR; INTRAVENOUS
Status: COMPLETED | OUTPATIENT
Start: 2022-04-07 | End: 2022-04-07

## 2022-04-07 RX ORDER — BUMETANIDE 1 MG/1
1 TABLET ORAL DAILY
Status: DISCONTINUED | OUTPATIENT
Start: 2022-04-08 | End: 2022-04-08 | Stop reason: HOSPADM

## 2022-04-07 RX ORDER — POTASSIUM CHLORIDE 20 MEQ/1
40 TABLET, EXTENDED RELEASE ORAL
Status: DISCONTINUED | OUTPATIENT
Start: 2022-04-07 | End: 2022-04-08 | Stop reason: HOSPADM

## 2022-04-07 RX ORDER — LISINOPRIL 20 MG/1
40 TABLET ORAL DAILY
Status: DISCONTINUED | OUTPATIENT
Start: 2022-04-08 | End: 2022-04-08 | Stop reason: HOSPADM

## 2022-04-07 RX ORDER — ASPIRIN 325 MG
325 TABLET ORAL
Status: COMPLETED | OUTPATIENT
Start: 2022-04-07 | End: 2022-04-07

## 2022-04-07 RX ORDER — ALLOPURINOL 100 MG/1
100 TABLET ORAL DAILY
Status: DISCONTINUED | OUTPATIENT
Start: 2022-04-08 | End: 2022-04-08 | Stop reason: HOSPADM

## 2022-04-07 RX ORDER — AMOXICILLIN 250 MG
1 CAPSULE ORAL 2 TIMES DAILY
Status: DISCONTINUED | OUTPATIENT
Start: 2022-04-08 | End: 2022-04-08 | Stop reason: HOSPADM

## 2022-04-07 RX ORDER — POTASSIUM CHLORIDE 750 MG/1
10 CAPSULE, EXTENDED RELEASE ORAL DAILY
Status: DISCONTINUED | OUTPATIENT
Start: 2022-04-08 | End: 2022-04-08 | Stop reason: HOSPADM

## 2022-04-07 RX ORDER — WARFARIN 2.5 MG/1
2.5 TABLET ORAL
Status: DISCONTINUED | OUTPATIENT
Start: 2022-04-11 | End: 2022-04-08 | Stop reason: HOSPADM

## 2022-04-07 RX ORDER — LIDOCAINE HYDROCHLORIDE 20 MG/ML
10 SOLUTION OROPHARYNGEAL ONCE
Status: COMPLETED | OUTPATIENT
Start: 2022-04-07 | End: 2022-04-07

## 2022-04-07 RX ORDER — FLUTICASONE FUROATE AND VILANTEROL 100; 25 UG/1; UG/1
1 POWDER RESPIRATORY (INHALATION) DAILY
Status: DISCONTINUED | OUTPATIENT
Start: 2022-04-08 | End: 2022-04-08 | Stop reason: HOSPADM

## 2022-04-07 RX ORDER — POTASSIUM CHLORIDE 20 MEQ/1
20 TABLET, EXTENDED RELEASE ORAL
Status: DISCONTINUED | OUTPATIENT
Start: 2022-04-07 | End: 2022-04-08 | Stop reason: HOSPADM

## 2022-04-07 RX ORDER — DOXAZOSIN 1 MG/1
4 TABLET ORAL NIGHTLY
Status: DISCONTINUED | OUTPATIENT
Start: 2022-04-08 | End: 2022-04-08 | Stop reason: HOSPADM

## 2022-04-07 RX ORDER — MAGNESIUM SULFATE 1 G/100ML
1 INJECTION INTRAVENOUS
Status: DISCONTINUED | OUTPATIENT
Start: 2022-04-07 | End: 2022-04-08 | Stop reason: HOSPADM

## 2022-04-07 RX ORDER — MAGNESIUM SULFATE HEPTAHYDRATE 40 MG/ML
4 INJECTION, SOLUTION INTRAVENOUS
Status: DISCONTINUED | OUTPATIENT
Start: 2022-04-07 | End: 2022-04-08 | Stop reason: HOSPADM

## 2022-04-07 RX ORDER — ACETAMINOPHEN 500 MG
2000 TABLET ORAL DAILY
Status: DISCONTINUED | OUTPATIENT
Start: 2022-04-08 | End: 2022-04-08

## 2022-04-07 RX ORDER — HYDROXYCHLOROQUINE SULFATE 200 MG/1
200 TABLET, FILM COATED ORAL DAILY
Status: DISCONTINUED | OUTPATIENT
Start: 2022-04-08 | End: 2022-04-08 | Stop reason: HOSPADM

## 2022-04-07 RX ORDER — MAG HYDROX/ALUMINUM HYD/SIMETH 200-200-20
30 SUSPENSION, ORAL (FINAL DOSE FORM) ORAL ONCE
Status: COMPLETED | OUTPATIENT
Start: 2022-04-07 | End: 2022-04-07

## 2022-04-07 RX ORDER — LANOLIN ALCOHOL/MO/W.PET/CERES
800 CREAM (GRAM) TOPICAL
Status: DISCONTINUED | OUTPATIENT
Start: 2022-04-07 | End: 2022-04-08 | Stop reason: HOSPADM

## 2022-04-07 RX ADMIN — ALUMINA, MAGNESIA, AND SIMETHICONE ORAL SUSPENSION REGULAR STRENGTH 30 ML: 1200; 1200; 120 SUSPENSION ORAL at 02:04

## 2022-04-07 RX ADMIN — ONDANSETRON 4 MG: 2 INJECTION INTRAMUSCULAR; INTRAVENOUS at 04:04

## 2022-04-07 RX ADMIN — LIDOCAINE HYDROCHLORIDE 10 ML: 20 SOLUTION ORAL; TOPICAL at 02:04

## 2022-04-07 RX ADMIN — ASPIRIN 325 MG ORAL TABLET 325 MG: 325 PILL ORAL at 04:04

## 2022-04-07 NOTE — HPI
Mr. Thomas is a 81 year old male with a history of NIDDM2 - diet controlled, COPD, CKDIII, PM, PAF coumadin, and RA who presents today with complaints of chest pain. It is moderate. It is associated with SOB. He denies fever, chills, N/V/D, dizziness or LOC. Chest pain began at rest, was retrosternal and radiated to the epigastric and RUQ region and described as a tightness. It began about 4 hour prior to arrival. While in the ED, he states he passed some gas and had relief of chest pain. He is currently chest pain free. Hospital medicine is consulted for admission for cardiac work up. He was previously a patient of Dr. Dykes and recently established with Dr. Zapata, but no recent cardiac testing.

## 2022-04-07 NOTE — H&P
[facility] Medicine  History & Physical    DOS: 04/07/2022  5:55 PM      Patient Name: Kennedy Thomas  MRN: [id number]  Patient Class: OP- Observation  Admission Date: 4/7/2022  Attending Physician: Dr. Kirby  Primary Care Provider: Fernando Eagle MD         Patient information was obtained from patient, spouse/SO, past medical records and ER records.     Subjective:     Principal Problem:Chest pain    Chief Complaint:   Chief Complaint   Patient presents with    Abdominal Pain     RUQ pain, onset 30 minutes ago        HPI: Mr. Thomas is a 81 year old male with a history of NIDDM2 - diet controlled, COPD, CKDIII, PM, PAF coumadin, and RA who presents today with complaints of chest pain. It is moderate. It is associated with SOB. He denies fever, chills, N/V/D, dizziness or LOC. Chest pain began at rest, was retrosternal and radiated to the epigastric and RUQ region and described as a tightness. It began about 4 hour prior to arrival. While in the ED, he states he passed some gas and had relief of chest pain. He is currently chest pain free. Hospital medicine is consulted for admission for cardiac work up. He was previously a patient of Dr. Dykes and recently established with Dr. Zapata, but no recent cardiac testing.       Past Medical History:   Diagnosis Date    Arthritis     Atrial fibrillation     CKD (chronic kidney disease)     COPD (chronic obstructive pulmonary disease)     Diabetes mellitus, type 2     Gout     Heart block     Hypertension     Pancreatitis     Peripheral vascular disease        Past Surgical History:   Procedure Laterality Date    APPENDECTOMY      CARDIAC PACEMAKER PLACEMENT  2010    EYE SURGERY      INGUINAL HERNIA REPAIR Left        Review of patient's allergies indicates:   Allergen Reactions    Statins-hmg-coa reductase inhibitors        No current facility-administered medications on file prior to encounter.     Current Outpatient Medications  on File Prior to Encounter   Medication Sig    allopurinoL (ZYLOPRIM) 100 MG tablet TAKE 1 TABLET(100 MG) BY MOUTH EVERY DAY (Patient taking differently: Take 100 mg by mouth once daily.)    benazepril (LOTENSIN) 40 MG tablet Take 40 mg by mouth once daily.    bumetanide (BUMEX) 1 MG tablet Take 1 mg by mouth once daily.    ergocalciferol, vitamin D2, (VITAMIN D2 ORAL) Take 5,000 Units by mouth once daily.    hydrOXYchloroQUINE (PLAQUENIL) 200 mg tablet TAKE 1 TABLET(200 MG) BY MOUTH EVERY DAY (Patient taking differently: Take 200 mg by mouth once daily.)    metoprolol succinate (TOPROL-XL) 50 MG 24 hr tablet Take 50 mg by mouth once daily.    potassium chloride (KLOR-CON) 10 MEQ TbSR Take 10 mEq by mouth once daily.    terazosin (HYTRIN) 5 MG capsule Take 5 mg by mouth every evening.    warfarin (COUMADIN) 5 MG tablet Take 7.5 mg by mouth Daily.    SYMBICORT 160-4.5 mcg/actuation HFAA Inhale 1 puff into the lungs as needed.    [DISCONTINUED] clotrimazole-betamethasone 1-0.05% (LOTRISONE) cream Apply 1 g topically 2 (two) times daily.    [DISCONTINUED] donepeziL (ARICEPT) 5 MG tablet Take 5 mg by mouth once daily.     Family History       Problem Relation (Age of Onset)    Diabetes Mellitus Mother          Tobacco Use    Smoking status: Former Smoker    Smokeless tobacco: Never Used   Substance and Sexual Activity    Alcohol use: No    Drug use: No    Sexual activity: Not on file     Review of Systems   Constitutional:  Negative for chills, diaphoresis and fever.   HENT:  Negative for congestion, ear pain, sore throat and trouble swallowing.    Eyes:  Negative for pain, discharge and visual disturbance.   Respiratory:  Negative for cough, chest tightness, shortness of breath and wheezing.    Cardiovascular:  Positive for chest pain. Negative for palpitations and leg swelling.   Gastrointestinal:  Positive for abdominal pain. Negative for abdominal distention, blood in stool, constipation, diarrhea,  nausea and vomiting.   Endocrine: Negative for polydipsia, polyphagia and polyuria.   Genitourinary:  Negative for dysuria, flank pain, frequency and urgency.   Musculoskeletal:  Negative for back pain, joint swelling, neck pain and neck stiffness.   Skin:  Negative for rash and wound.   Allergic/Immunologic: Negative for immunocompromised state.   Neurological:  Negative for dizziness, syncope, speech difficulty, weakness, light-headedness, numbness and headaches.   Hematological:  Negative for adenopathy.   Psychiatric/Behavioral:  Negative for confusion and suicidal ideas. The patient is not nervous/anxious.    All other systems reviewed and are negative.  Objective:     Vital Signs (Most Recent):  Temp: 97.5 °F (36.4 °C) (04/07/22 1216)  Pulse: 61 (04/07/22 1800)  Resp: 14 (04/07/22 1800)  BP: 127/61 (04/07/22 1800)  SpO2: (!) 94 % (04/07/22 1800)   Vital Signs (24h Range):  Temp:  [97.5 °F (36.4 °C)] 97.5 °F (36.4 °C)  Pulse:  [60-86] 61  Resp:  [14-26] 14  SpO2:  [91 %-97 %] 94 %  BP: (127-155)/(61-87) 127/61     Weight: 108.4 kg (239 lb)  Body mass index is 32.41 kg/m².    Physical Exam  Vitals and nursing note reviewed.   Constitutional:       Appearance: He is well-developed. He is obese.   HENT:      Head: Normocephalic and atraumatic.   Eyes:      Conjunctiva/sclera: Conjunctivae normal.      Pupils: Pupils are equal, round, and reactive to light.   Cardiovascular:      Rate and Rhythm: Normal rate and regular rhythm.      Heart sounds: Normal heart sounds.   Pulmonary:      Effort: Pulmonary effort is normal.      Breath sounds: Normal breath sounds.   Abdominal:      General: Bowel sounds are normal.      Palpations: Abdomen is soft.   Musculoskeletal:         General: Normal range of motion.      Cervical back: Normal range of motion and neck supple.   Skin:     General: Skin is warm and dry.      Capillary Refill: Capillary refill takes less than 2 seconds.      Comments: Bilateral venous stasis  dermatitis bilateral venous stasis dermatitis   Neurological:      Mental Status: He is alert and oriented to person, place, and time.   Psychiatric:         Behavior: Behavior normal.         Thought Content: Thought content normal.         Judgment: Judgment normal.         CRANIAL NERVES     CN III, IV, VI   Pupils are equal, round, and reactive to light.     Significant Labs: All pertinent labs within the past 24 hours have been reviewed.  CBC:   Recent Labs   Lab 04/07/22  1245   WBC 5.15   HGB 13.5*   HCT 38.9*   *     CMP:   Recent Labs   Lab 04/07/22  1245      K 3.7      CO2 30*      BUN 19   CREATININE 0.8   CALCIUM 9.3   PROT 7.3   ALBUMIN 4.4   BILITOT 1.3*   ALKPHOS 64   AST 33   ALT 23   ANIONGAP 11   EGFRNONAA >60.0     Troponin:   Recent Labs   Lab 04/07/22  1245   TROPONINI <0.030       Significant Imaging: I have reviewed all pertinent imaging results/findings within the past 24 hours.  EKG: I have reviewed all pertinent results/findings within the past 24 hours and my personal findings are: AV paced rhythm    X-Ray Chest AP Portable    Result Date: 4/7/2022  Chest single view CLINICAL DATA: Chest pain FINDINGS: Comparison April 2016. Heart size is upper normal. Cardiac pacemaker device is noted. The mediastinum is unremarkable. No infiltrates are identified. There is mild linear scarring at the left lung base. Severe arthritic changes are noted at the left shoulder. IMPRESSION: 1. No acute process. Chronic findings as above. Electronically signed by:  Ji Kee MD  4/7/2022 4:34 PM CDT Workstation: [id number]       Assessment/Plan:     * Chest pain  Admit to cardiology B  Trend troponin - first is negative  ASA  SL NTG PRN  Continue home meds  NPO after MN for NM stress in AM   EKG prn chest pain         Multiple chronic diseases  Continue appropriate home meds  Check INR and dose coumadin per clinic course       Flatulence  F/u with GI      Pancytopenia  Mild,  chronic, Multifactorial, monitor        VTE Risk Mitigation (From admission, onward)    None             Senia Oreilly, NP  Department of Hospital Medicine   [facility]

## 2022-04-07 NOTE — SUBJECTIVE & OBJECTIVE
Past Medical History:   Diagnosis Date    Arthritis     Atrial fibrillation     CKD (chronic kidney disease)     COPD (chronic obstructive pulmonary disease)     Diabetes mellitus, type 2     Gout     Heart block     Hypertension     Pancreatitis     Peripheral vascular disease        Past Surgical History:   Procedure Laterality Date    APPENDECTOMY      CARDIAC PACEMAKER PLACEMENT  2010    EYE SURGERY      INGUINAL HERNIA REPAIR Left        Review of patient's allergies indicates:   Allergen Reactions    Statins-hmg-coa reductase inhibitors        No current facility-administered medications on file prior to encounter.     Current Outpatient Medications on File Prior to Encounter   Medication Sig    allopurinoL (ZYLOPRIM) 100 MG tablet TAKE 1 TABLET(100 MG) BY MOUTH EVERY DAY (Patient taking differently: Take 100 mg by mouth once daily.)    benazepril (LOTENSIN) 40 MG tablet Take 40 mg by mouth once daily.    bumetanide (BUMEX) 1 MG tablet Take 1 mg by mouth once daily.    ergocalciferol, vitamin D2, (VITAMIN D2 ORAL) Take 5,000 Units by mouth once daily.    hydrOXYchloroQUINE (PLAQUENIL) 200 mg tablet TAKE 1 TABLET(200 MG) BY MOUTH EVERY DAY (Patient taking differently: Take 200 mg by mouth once daily.)    metoprolol succinate (TOPROL-XL) 50 MG 24 hr tablet Take 50 mg by mouth once daily.    potassium chloride (KLOR-CON) 10 MEQ TbSR Take 10 mEq by mouth once daily.    terazosin (HYTRIN) 5 MG capsule Take 5 mg by mouth every evening.    warfarin (COUMADIN) 5 MG tablet Take 7.5 mg by mouth Daily.    SYMBICORT 160-4.5 mcg/actuation HFAA Inhale 1 puff into the lungs as needed.    [DISCONTINUED] clotrimazole-betamethasone 1-0.05% (LOTRISONE) cream Apply 1 g topically 2 (two) times daily.    [DISCONTINUED] donepeziL (ARICEPT) 5 MG tablet Take 5 mg by mouth once daily.     Family History       Problem Relation (Age of Onset)    Diabetes Mellitus Mother          Tobacco Use    Smoking status: Former Smoker    Smokeless  tobacco: Never Used   Substance and Sexual Activity    Alcohol use: No    Drug use: No    Sexual activity: Not on file     Review of Systems   Constitutional:  Negative for chills, diaphoresis and fever.   HENT:  Negative for congestion, ear pain, sore throat and trouble swallowing.    Eyes:  Negative for pain, discharge and visual disturbance.   Respiratory:  Negative for cough, chest tightness, shortness of breath and wheezing.    Cardiovascular:  Positive for chest pain. Negative for palpitations and leg swelling.   Gastrointestinal:  Positive for abdominal pain. Negative for abdominal distention, blood in stool, constipation, diarrhea, nausea and vomiting.   Endocrine: Negative for polydipsia, polyphagia and polyuria.   Genitourinary:  Negative for dysuria, flank pain, frequency and urgency.   Musculoskeletal:  Negative for back pain, joint swelling, neck pain and neck stiffness.   Skin:  Negative for rash and wound.   Allergic/Immunologic: Negative for immunocompromised state.   Neurological:  Negative for dizziness, syncope, speech difficulty, weakness, light-headedness, numbness and headaches.   Hematological:  Negative for adenopathy.   Psychiatric/Behavioral:  Negative for confusion and suicidal ideas. The patient is not nervous/anxious.    All other systems reviewed and are negative.  Objective:     Vital Signs (Most Recent):  Temp: 97.5 °F (36.4 °C) (04/07/22 1216)  Pulse: 61 (04/07/22 1800)  Resp: 14 (04/07/22 1800)  BP: 127/61 (04/07/22 1800)  SpO2: (!) 94 % (04/07/22 1800)   Vital Signs (24h Range):  Temp:  [97.5 °F (36.4 °C)] 97.5 °F (36.4 °C)  Pulse:  [60-86] 61  Resp:  [14-26] 14  SpO2:  [91 %-97 %] 94 %  BP: (127-155)/(61-87) 127/61     Weight: 108.4 kg (239 lb)  Body mass index is 32.41 kg/m².    Physical Exam  Vitals and nursing note reviewed.   Constitutional:       Appearance: He is well-developed. He is obese.   HENT:      Head: Normocephalic and atraumatic.   Eyes:      Conjunctiva/sclera:  Conjunctivae normal.      Pupils: Pupils are equal, round, and reactive to light.   Cardiovascular:      Rate and Rhythm: Normal rate and regular rhythm.      Heart sounds: Normal heart sounds.   Pulmonary:      Effort: Pulmonary effort is normal.      Breath sounds: Normal breath sounds.   Abdominal:      General: Bowel sounds are normal.      Palpations: Abdomen is soft.   Musculoskeletal:         General: Normal range of motion.      Cervical back: Normal range of motion and neck supple.   Skin:     General: Skin is warm and dry.      Capillary Refill: Capillary refill takes less than 2 seconds.      Comments: Bilateral venous stasis dermatitis bilateral venous stasis dermatitis   Neurological:      Mental Status: He is alert and oriented to person, place, and time.   Psychiatric:         Behavior: Behavior normal.         Thought Content: Thought content normal.         Judgment: Judgment normal.         CRANIAL NERVES     CN III, IV, VI   Pupils are equal, round, and reactive to light.     Significant Labs: All pertinent labs within the past 24 hours have been reviewed.  CBC:   Recent Labs   Lab 04/07/22  1245   WBC 5.15   HGB 13.5*   HCT 38.9*   *     CMP:   Recent Labs   Lab 04/07/22  1245      K 3.7      CO2 30*      BUN 19   CREATININE 0.8   CALCIUM 9.3   PROT 7.3   ALBUMIN 4.4   BILITOT 1.3*   ALKPHOS 64   AST 33   ALT 23   ANIONGAP 11   EGFRNONAA >60.0     Troponin:   Recent Labs   Lab 04/07/22  1245   TROPONINI <0.030       Significant Imaging: I have reviewed all pertinent imaging results/findings within the past 24 hours.  EKG: I have reviewed all pertinent results/findings within the past 24 hours and my personal findings are: AV paced rhythm    X-Ray Chest AP Portable    Result Date: 4/7/2022  Chest single view CLINICAL DATA: Chest pain FINDINGS: Comparison April 2016. Heart size is upper normal. Cardiac pacemaker device is noted. The mediastinum is unremarkable. No  infiltrates are identified. There is mild linear scarring at the left lung base. Severe arthritic changes are noted at the left shoulder. IMPRESSION: 1. No acute process. Chronic findings as above. Electronically signed by:  Ji Kee MD  4/7/2022 4:34 PM CDT Workstation: [id number]

## 2022-04-07 NOTE — FIRST PROVIDER EVALUATION
Emergency Department TeleTriage Encounter Note      CHIEF COMPLAINT    Chief Complaint   Patient presents with    Abdominal Pain     RUQ pain, onset 30 minutes ago       VITAL SIGNS   Initial Vitals [04/07/22 1216]   BP Pulse Resp Temp SpO2   (!) 155/87 86 18 97.5 °F (36.4 °C) 97 %      MAP       --            ALLERGIES    Review of patient's allergies indicates:   Allergen Reactions    Statins-hmg-coa reductase inhibitors        PROVIDER TRIAGE NOTE      81-year-old male presents ER for evaluation of right-sided abdominal pain.  Patient states symptoms initially started in the epigastric region is now located to the right upper quadrant.  No vomiting or diarrhea.  Denies fever or chills    PE:  Patient alert and oriented. No acute distress    Initial orders will be placed and care will be transferred to an alternate provider when patient is roomed for a full evaluation. Any additional orders and the final disposition will be determined by that provider.        ORDERS  Labs Reviewed   CBC W/ AUTO DIFFERENTIAL   COMPREHENSIVE METABOLIC PANEL   LIPASE   URINALYSIS, REFLEX TO URINE CULTURE       ED Orders (720h ago, onward)    Start Ordered     Status Ordering Provider    04/07/22 1239 04/07/22 1238  CBC auto differential  STAT         Acknowledged NAYLA SHAVER    04/07/22 1239 04/07/22 1238  Comprehensive metabolic panel  STAT         Acknowledged NAYLA SHAVER    04/07/22 1239 04/07/22 1238  Lipase  STAT         Acknowledged NAYLA SHAVER    04/07/22 1239 04/07/22 1238  Urinalysis, Reflex to Urine Culture Urine, Clean Catch  STAT         Acknowledged NAYLA SHAVER    04/07/22 1239 04/07/22 1238  Insert Saline lock IV  Once         Acknowledged NAYLA SHAVER            Virtual Visit Note: The provider triage portion of this emergency department evaluation and documentation was performed via [employer], a HIPAA-compliant telemedicine application, in concert with a tele-presenter in the room. DUANE  face to face patient evaluation with one of my colleagues will occur once the patient is placed in an emergency department room.      DISCLAIMER: This note was prepared with "[employer]" voice recognition transcription software. Garbled syntax, mangled pronouns, and other bizarre constructions may be attributed to that software system.

## 2022-04-07 NOTE — ASSESSMENT & PLAN NOTE
Admit to cardiology B  Trend troponin - first is negative  ASA  SL NTG PRN  Continue home meds  NPO after MN for NM stress in AM   EKG prn chest pain

## 2022-04-07 NOTE — ED PROVIDER NOTES
Encounter Date: 4/7/2022       History     Chief Complaint   Patient presents with    Abdominal Pain     RUQ pain, onset 30 minutes ago     HPI   81-year-old man with history of atrial fibrillation on Coumadin, COPD, CKD, diabetes, hypertension, hyperlipidemia, peripheral vascular disease, pancreatitis presenting with chest pain.  Patient reports that about 4 hours ago he had retrosternal chest pain while rest.  The pain did radiate somewhat to the epigastric region right upper quadrant and by the time he arrived here he felt that the epigastric and right upper quadrant pain was worse and so this is what he had reported to tele triage.  On history now he reports that the main pain had started retrosternally and this is the pain that he has had intermittently since until a little while before I interviewed him.  He reports he does not have the pain currently.  Reports he was feeling fine before this episode.  Has never had pain like this before.  No nausea, vomiting, near syncope, shortness of breath, recent immobilization, leg pain, leg swelling, history of cancer..  Review of patient's allergies indicates:   Allergen Reactions    Statins-hmg-coa reductase inhibitors      Past Medical History:   Diagnosis Date    Arthritis     Atrial fibrillation     CKD (chronic kidney disease)     COPD (chronic obstructive pulmonary disease)     Diabetes mellitus, type 2     Gout     Heart block     Hypertension     Pancreatitis     Peripheral vascular disease      Past Surgical History:   Procedure Laterality Date    APPENDECTOMY      CARDIAC PACEMAKER PLACEMENT  2010    EYE SURGERY      INGUINAL HERNIA REPAIR Left      Family History   Problem Relation Age of Onset    Diabetes Mellitus Mother      Social History     Tobacco Use    Smoking status: Former Smoker    Smokeless tobacco: Never Used   Substance Use Topics    Alcohol use: No    Drug use: No     Review of Systems   Constitutional: Negative for fever.    HENT: Negative for sore throat.    Respiratory: Negative for shortness of breath.    Cardiovascular: Positive for chest pain.   Gastrointestinal: Positive for abdominal pain. Negative for nausea.   Genitourinary: Negative for dysuria.   Musculoskeletal: Negative for back pain.   Skin: Negative for rash.   Neurological: Negative for weakness.   Hematological: Does not bruise/bleed easily.       Physical Exam     Initial Vitals [04/07/22 1216]   BP Pulse Resp Temp SpO2   (!) 155/87 86 18 97.5 °F (36.4 °C) 97 %      MAP       --         Physical Exam    Nursing note and vitals reviewed.  Constitutional: He appears well-developed and well-nourished. He is not diaphoretic.   Answering questions in full sentences without increased work of breathing.    HENT:   Head: Normocephalic.   Eyes: Right eye exhibits no discharge. Left eye exhibits no discharge. No scleral icterus.   Neck: Neck supple. No tracheal deviation present.   Normal range of motion.  Cardiovascular: Normal rate and regular rhythm. Exam reveals no gallop and no friction rub.    No murmur heard.  Pulmonary/Chest: No stridor. No respiratory distress. He has no wheezes. He has no rhonchi. He has no rales. He exhibits no tenderness.   Abdominal: Abdomen is soft. Bowel sounds are normal. He exhibits no distension. There is no abdominal tenderness. There is no rebound and no guarding.   Musculoskeletal:         General: No edema.      Cervical back: Normal range of motion and neck supple.     Neurological: He is alert and oriented to person, place, and time.   Moving all extremities   Skin: Skin is warm and dry.         ED Course   Procedures  Labs Reviewed   CBC W/ AUTO DIFFERENTIAL - Abnormal; Notable for the following components:       Result Value    RBC 4.33 (*)     Hemoglobin 13.5 (*)     Hematocrit 38.9 (*)     MCH 31.2 (*)     Platelets 100 (*)     All other components within normal limits   COMPREHENSIVE METABOLIC PANEL - Abnormal; Notable for the  following components:    CO2 30 (*)     Total Bilirubin 1.3 (*)     All other components within normal limits   B-TYPE NATRIURETIC PEPTIDE - Abnormal; Notable for the following components:     (*)     All other components within normal limits   LIPASE   URINALYSIS, REFLEX TO URINE CULTURE    Narrative:     Specimen Source->Urine   B-TYPE NATRIURETIC PEPTIDE   TROPONIN I   TROPONIN I        ECG Results          EKG 12-lead (In process)  Result time 04/07/22 16:32:11    In process by Interface, Lab In [facility] (04/07/22 16:32:11)                 Narrative:    Test Reason : R07.9,    Vent. Rate : 063 BPM     Atrial Rate : 063 BPM     P-R Int : 198 ms          QRS Dur : 158 ms      QT Int : 454 ms       P-R-T Axes : 000 -57 140 degrees     QTc Int : 464 ms    AV dual-paced rhythm  Abnormal ECG  No previous ECGs available    Referred By: [email or web address]   SELF           Confirmed By:                             Imaging Results          X-Ray Chest AP Portable (Final result)  Result time 04/07/22 16:34:13    Final result by Ji Kee MD (04/07/22 16:34:13)                 Narrative:    Chest single view    CLINICAL DATA: Chest pain    FINDINGS: Comparison April 2016. Heart size is upper normal. Cardiac pacemaker device is noted. The mediastinum is unremarkable. No infiltrates are identified. There is mild linear scarring at the left lung base. Severe arthritic changes are noted at the left shoulder.    IMPRESSION:  1. No acute process. Chronic findings as above.    Electronically signed by:  Ji Kee MD  4/7/2022 4:34 PM CDT Workstation: [id number]                               Medications   aluminum-magnesium hydroxide-simethicone 200-200-20 mg/5 mL suspension 30 mL (30 mLs Oral Given 4/7/22 1223)     And   LIDOcaine HCl 2% oral solution 10 mL (10 mLs Oral Given 4/7/22 0743)   aspirin tablet 325 mg (325 mg Oral Given 4/7/22 1100)   ondansetron injection 4 mg (4 mg Intravenous Given 4/7/22 9350)                 Attending Attestation:   Physician Attestation Statement for Resident:  As the supervising MD   Physician Attestation Statement: I have personally seen and examined this patient.   I agree with the above history. -:   As the supervising MD I agree with the above PE.    As the supervising MD I agree with the above treatment, course, plan, and disposition.                       MDM  80 yo presenting with chest pain and upper abd pain. Hx of afib on coumadin, htn, pvd, dm, copd, ckd     Differential: acs, pancreatitis, gerd, cholelithiasis, pe, ptx, pneumonia    Vitals within acceptable limits on presentation     Clinical picture concerning for ACS.  Per tele triaged as patient reported epigastric and right upper quadrant pain labs evaluating for abdominal abnormalities such as CBC, CMP, lipase, UA were completed and these were all within acceptable limits.  Patient's abdominal exam is soft, nontender, nonsurgical abdomen. On history now, pt and wife reporting that  Pain initially in the retrosternal area with some radiation to the epigastric and right upper quadrant region. It has now subsided.  Heart score 5 due to risk factors, age, moderately suspicious story. Cardiac workup ordered. Anticipate admission. Pt and wife agreeable to plan.   Aleida Albarado MD  [facility] HO-3  4:13 PM 4/7/22     UPDATE:   Cardiac labs including troponin, BNP, chest x-ray, EKG within acceptable limits, no acute STEMI.  Patient reports he remains without chest pain currently.  Vital stable.  Have talked to the hospitalist who agrees to admit patient for high risk chest pain.  Aleida Albarado MD  [facility] HO-3  5:25 PM 4/7/22      Clinical Impression:   Final diagnoses:  [R07.9] Chest pain          ED Disposition Condition    Admit               Aleida Albarado MD  Resident  04/07/22 1722       Kameron Wiley MD  04/07/22 5335

## 2022-04-08 ENCOUNTER — CLINICAL SUPPORT (OUTPATIENT)
Dept: CARDIOLOGY | Facility: HOSPITAL | Age: 82
End: 2022-04-08
Payer: MEDICARE

## 2022-04-08 ENCOUNTER — HOSPITAL ENCOUNTER (OUTPATIENT)
Dept: RADIOLOGY | Facility: HOSPITAL | Age: 82
Discharge: HOME | End: 2022-04-08
Payer: MEDICARE

## 2022-04-08 VITALS
TEMPERATURE: 98 F | RESPIRATION RATE: 16 BRPM | DIASTOLIC BLOOD PRESSURE: 65 MMHG | SYSTOLIC BLOOD PRESSURE: 113 MMHG | HEIGHT: 72 IN | WEIGHT: 230.81 LBS | BODY MASS INDEX: 31.26 KG/M2 | OXYGEN SATURATION: 93 % | HEART RATE: 68 BPM

## 2022-04-08 PROBLEM — R07.9 CHEST PAIN: Status: RESOLVED | Noted: 2022-04-07 | Resolved: 2022-04-08

## 2022-04-08 PROBLEM — R14.3 FLATULENCE: Status: RESOLVED | Noted: 2022-04-07 | Resolved: 2022-04-08

## 2022-04-08 LAB
ANION GAP SERPL CALC-SCNC: 8 MMOL/L (ref 8–16)
BASOPHILS # BLD AUTO: 0.04 K/UL (ref 0–0.2)
BASOPHILS NFR BLD: 0.9 % (ref 0–1.9)
BUN SERPL-MCNC: 20 MG/DL (ref 8–23)
CALCIUM SERPL-MCNC: 8.7 MG/DL (ref 8.7–10.5)
CHLORIDE SERPL-SCNC: 101 MMOL/L (ref 95–110)
CO2 SERPL-SCNC: 29 MMOL/L (ref 23–29)
CREAT SERPL-MCNC: 0.9 MG/DL (ref 0.5–1.4)
CV PHARM DOSE: 0.4 MG
CV STRESS BASE HR: 61 BPM
DIASTOLIC BLOOD PRESSURE: 78 MMHG
DIFFERENTIAL METHOD: ABNORMAL
EOSINOPHIL # BLD AUTO: 0.2 K/UL (ref 0–0.5)
EOSINOPHIL NFR BLD: 3.6 % (ref 0–8)
ERYTHROCYTE [DISTWIDTH] IN BLOOD BY AUTOMATED COUNT: 13.1 % (ref 11.5–14.5)
EST. GFR  (AFRICAN AMERICAN): >60 ML/MIN/1.73 M^2
EST. GFR  (NON AFRICAN AMERICAN): >60 ML/MIN/1.73 M^2
GLUCOSE SERPL-MCNC: 105 MG/DL (ref 70–110)
HCT VFR BLD AUTO: 35.9 % (ref 40–54)
HGB BLD-MCNC: 12.1 G/DL (ref 14–18)
IMM GRANULOCYTES # BLD AUTO: 0.01 K/UL (ref 0–0.04)
IMM GRANULOCYTES NFR BLD AUTO: 0.2 % (ref 0–0.5)
INR PPP: 2.2
LYMPHOCYTES # BLD AUTO: 1.1 K/UL (ref 1–4.8)
LYMPHOCYTES NFR BLD: 24.3 % (ref 18–48)
MAGNESIUM SERPL-MCNC: 2.3 MG/DL (ref 1.6–2.6)
MCH RBC QN AUTO: 30.6 PG (ref 27–31)
MCHC RBC AUTO-ENTMCNC: 33.7 G/DL (ref 32–36)
MCV RBC AUTO: 91 FL (ref 82–98)
MONOCYTES # BLD AUTO: 0.5 K/UL (ref 0.3–1)
MONOCYTES NFR BLD: 11.1 % (ref 4–15)
NEUTROPHILS # BLD AUTO: 2.7 K/UL (ref 1.8–7.7)
NEUTROPHILS NFR BLD: 59.9 % (ref 38–73)
NRBC BLD-RTO: 0 /100 WBC
OHS CV CPX 1 MINUTE RECOVERY HEART RATE: 61 BPM
OHS CV CPX 85 PERCENT MAX PREDICTED HEART RATE MALE: 120
OHS CV CPX MAX PREDICTED HEART RATE: 141
OHS CV CPX PATIENT IS FEMALE: 0
OHS CV CPX PATIENT IS MALE: 1
OHS CV CPX PEAK DIASTOLIC BLOOD PRESSURE: 68 MMHG
OHS CV CPX PEAK HEAR RATE: 62 BPM
OHS CV CPX PEAK RATE PRESSURE PRODUCT: 7750
OHS CV CPX PEAK SYSTOLIC BLOOD PRESSURE: 125 MMHG
OHS CV CPX PERCENT MAX PREDICTED HEART RATE ACHIEVED: 44
OHS CV CPX RATE PRESSURE PRODUCT PRESENTING: 8479
PLATELET # BLD AUTO: 90 K/UL (ref 150–450)
PMV BLD AUTO: 10.9 FL (ref 9.2–12.9)
POTASSIUM SERPL-SCNC: 4 MMOL/L (ref 3.5–5.1)
PROTHROMBIN TIME: 22.9 SEC (ref 11.4–13.7)
RBC # BLD AUTO: 3.95 M/UL (ref 4.6–6.2)
SODIUM SERPL-SCNC: 138 MMOL/L (ref 136–145)
SYSTOLIC BLOOD PRESSURE: 139 MMHG
TROPONIN I SERPL DL<=0.01 NG/ML-MCNC: <0.03 NG/ML
TROPONIN I SERPL DL<=0.01 NG/ML-MCNC: <0.03 NG/ML
WBC # BLD AUTO: 4.49 K/UL (ref 3.9–12.7)

## 2022-04-08 PROCEDURE — 85025 COMPLETE CBC W/AUTO DIFF WBC: CPT | Performed by: NURSE PRACTITIONER

## 2022-04-08 PROCEDURE — 25000003 PHARM REV CODE 250: Performed by: NURSE PRACTITIONER

## 2022-04-08 PROCEDURE — G0378 HOSPITAL OBSERVATION PER HR: HCPCS

## 2022-04-08 PROCEDURE — 80048 BASIC METABOLIC PNL TOTAL CA: CPT | Performed by: NURSE PRACTITIONER

## 2022-04-08 PROCEDURE — 93018 CV STRESS TEST I&R ONLY: CPT | Mod: ,,, | Performed by: INTERNAL MEDICINE

## 2022-04-08 PROCEDURE — A9502 TC99M TETROFOSMIN: HCPCS

## 2022-04-08 PROCEDURE — 93016 CV STRESS TEST SUPVJ ONLY: CPT | Mod: ,,, | Performed by: INTERNAL MEDICINE

## 2022-04-08 PROCEDURE — 93018 NUCLEAR STRESS TEST (CUPID ONLY): ICD-10-PCS | Mod: ,,, | Performed by: INTERNAL MEDICINE

## 2022-04-08 PROCEDURE — 84484 ASSAY OF TROPONIN QUANT: CPT | Performed by: NURSE PRACTITIONER

## 2022-04-08 PROCEDURE — 93016 NUCLEAR STRESS TEST (CUPID ONLY): ICD-10-PCS | Mod: ,,, | Performed by: INTERNAL MEDICINE

## 2022-04-08 PROCEDURE — 84484 ASSAY OF TROPONIN QUANT: CPT | Mod: 91 | Performed by: NURSE PRACTITIONER

## 2022-04-08 PROCEDURE — 85610 PROTHROMBIN TIME: CPT | Performed by: NURSE PRACTITIONER

## 2022-04-08 PROCEDURE — 83735 ASSAY OF MAGNESIUM: CPT | Performed by: NURSE PRACTITIONER

## 2022-04-08 PROCEDURE — 36415 COLL VENOUS BLD VENIPUNCTURE: CPT | Performed by: NURSE PRACTITIONER

## 2022-04-08 PROCEDURE — 93017 CV STRESS TEST TRACING ONLY: CPT

## 2022-04-08 RX ORDER — REGADENOSON 0.08 MG/ML
0.4 INJECTION, SOLUTION INTRAVENOUS
Status: DISCONTINUED | OUTPATIENT
Start: 2022-04-08 | End: 2022-04-11 | Stop reason: HOSPADM

## 2022-04-08 RX ORDER — CHOLECALCIFEROL (VITAMIN D3) 50 MCG
2000 TABLET ORAL DAILY
Status: DISCONTINUED | OUTPATIENT
Start: 2022-04-08 | End: 2022-04-08 | Stop reason: HOSPADM

## 2022-04-08 RX ADMIN — POTASSIUM CHLORIDE 10 MEQ: 750 CAPSULE, EXTENDED RELEASE ORAL at 12:04

## 2022-04-08 RX ADMIN — METOPROLOL SUCCINATE 25 MG: 25 TABLET, EXTENDED RELEASE ORAL at 12:04

## 2022-04-08 RX ADMIN — HYDROXYCHLOROQUINE SULFATE 200 MG: 200 TABLET, FILM COATED ORAL at 12:04

## 2022-04-08 RX ADMIN — Medication 2000 UNITS: at 12:04

## 2022-04-08 RX ADMIN — SENNOSIDES AND DOCUSATE SODIUM 1 TABLET: 50; 8.6 TABLET ORAL at 12:04

## 2022-04-08 RX ADMIN — LISINOPRIL 40 MG: 20 TABLET ORAL at 12:04

## 2022-04-08 RX ADMIN — BUMETANIDE 1 MG: 1 TABLET ORAL at 12:04

## 2022-04-08 NOTE — PLAN OF CARE
"[facility]  Initial Discharge Assessment       Primary Care Provider: Fernando Eagle MD    Admission Diagnosis: Chest pain [R07.9]    Admission Date: 4/7/2022  Expected Discharge Date: 4/9/2022    Discharge Barriers Identified: None    Payor: [employer]ARE / Plan: [employer] 65 / Product Type: Medicare Advantage /     Extended Emergency Contact Information  Primary Emergency Contact: Cynthia Thomasie  Address: [address] [place], [place] [id number] [place] [address]  Home Phone: [phone]  Relation: Spouse    Discharge Plan A: Home with family  Discharge Plan B: Home      [id number] - [place], [place]  [address] & [facility]  [address] [facility] [address]  Phone: [phone] Fax: [phone]    Assessment performed at bedside with patient and wife, Gayla. Patient reports he is independent in his care. He denies having home health.  Reports taking Coumadin and having labs checked at [facility]. He reports wanting to go home at discharge.  No needs verbalized.     Initial Assessment (most recent)     Adult Discharge Assessment - 04/08/22 1021        Discharge Assessment    Assessment Type Discharge Planning Assessment     Confirmed/corrected address, phone number and insurance Yes     Confirmed Demographics Correct on Facesheet     Source of Information patient;family   pateint's wife Gayla Thomas    When was your last doctors appointment? --   patietn reports "a while ago"    Does patient/caregiver understand observation status Yes     Communicated NANCIE with patient/caregiver Date not available/Unable to determine     Reason For Admission Chest pain     Lives With spouse     Do you expect to return to your current living situation? Yes     Do you have help at home or someone to help you manage your care at home? Yes     Who are your caregiver(s) and their phone number(s)? Gayla Thomas [phone]     Prior to " hospitilization cognitive status: Alert/Oriented     Current cognitive status: Alert/Oriented     Walking or Climbing Stairs Difficulty none     Dressing/Bathing Difficulty none     Equipment Currently Used at Home none     Readmission within 30 days? No     Patient currently being followed by outpatient case management? No     Do you currently have service(s) that help you manage your care at home? No     Do you take prescription medications? Yes     Do you have any problems affording any of your prescribed medications? No     Is the patient taking medications as prescribed? yes     Who is going to help you get home at discharge? Wife: Gayla Thomas     How do you get to doctors appointments? family or friend will provide     Are you on dialysis? No     Do you take coumadin? Yes     Who monitors your labs? Dr. Eagle [facility] reports he goes to [facility] to have labs monitored     Discharge Plan A Home with family     Discharge Plan B Home     DME Needed Upon Discharge  none     Discharge Plan discussed with: Patient;Spouse/sig other     Discharge Barriers Identified None

## 2022-04-08 NOTE — NURSING
patient AAOx4. States he is independent. Declines bed alarm. Educated on patient safety. Family at bedside.

## 2022-04-08 NOTE — DISCHARGE SUMMARY
[facility]  Discharge Summary      Patient Name: Kennedy Thomas  MRN: [id number]  Patient Class: OP- Observation  Admission Date: 4/7/2022  Hospital Length of Stay: 0 days  Discharge Date and Time:  04/08/2022 6:43 PM  Attending Physician: No att. providers found   Discharging Provider: Albaro Laboy MD  Primary Care Provider: Fernando Eagle MD      HPI:   Mr. Thomas is a 81 year old male with a history of NIDDM2 - diet controlled, COPD, CKDIII, PM, PAF coumadin, and RA who presents today with complaints of chest pain. It is moderate. It is associated with SOB. He denies fever, chills, N/V/D, dizziness or LOC. Chest pain began at rest, was retrosternal and radiated to the epigastric and RUQ region and described as a tightness. It began about 4 hour prior to arrival. While in the ED, he states he passed some gas and had relief of chest pain. He is currently chest pain free. Hospital medicine is consulted for admission for cardiac work up. He was previously a patient of Dr. Dykes and recently established with Dr. Zapata, but no recent cardiac testing.       * No surgery found *      Hospital Course:   Patient admitted with chest pain  Serial Cardiac enzymes/EKG were normal  Pt had lexiscan stress test which came back as normal  Later pt was discharged to home       Goals of Care Treatment Preferences:  Code Status: Full Code      Consults:     No new Assessment & Plan notes have been filed under this hospital service since the last note was generated.  Service: Hospital Medicine    Final Active Diagnoses:    Diagnosis Date Noted POA    Pancytopenia [D61.818] 04/07/2022 Yes    Multiple chronic diseases [R69] 04/07/2022 Yes      Problems Resolved During this Admission:    Diagnosis Date Noted Date Resolved POA    PRINCIPAL PROBLEM:  Chest pain [R07.9] 04/07/2022 04/08/2022 Yes    Flatulence [R14.3] 04/07/2022 04/08/2022 Yes       Discharged Condition: good    Disposition: Home or Self  Care    Follow Up:    Patient Instructions:   No discharge procedures on file.    Significant Diagnostic Studies: Labs:   CMP   Recent Labs   Lab 04/07/22  1245 04/08/22  0419    138   K 3.7 4.0    101   CO2 30* 29    105   BUN 19 20   CREATININE 0.8 0.9   CALCIUM 9.3 8.7   PROT 7.3  --    ALBUMIN 4.4  --    BILITOT 1.3*  --    ALKPHOS 64  --    AST 33  --    ALT 23  --    ANIONGAP 11 8   ESTGFRAFRICA >60.0 >60.0   EGFRNONAA >60.0 >60.0    and CBC   Recent Labs   Lab 04/07/22  1245 04/08/22  0419   WBC 5.15 4.49   HGB 13.5* 12.1*   HCT 38.9* 35.9*   * 90*       Pending Diagnostic Studies:     None         Medications:  Reconciled Home Medications:      Medication List      CONTINUE taking these medications    allopurinoL 100 MG tablet  Commonly known as: ZYLOPRIM  TAKE 1 TABLET(100 MG) BY MOUTH EVERY DAY     benazepriL 40 MG tablet  Commonly known as: LOTENSIN  Take 40 mg by mouth once daily.     bumetanide 1 MG tablet  Commonly known as: BUMEX  Take 1 mg by mouth once daily.     hydrOXYchloroQUINE 200 mg tablet  Commonly known as: PLAQUENIL  TAKE 1 TABLET(200 MG) BY MOUTH EVERY DAY     metoprolol succinate 50 MG 24 hr tablet  Commonly known as: TOPROL-XL  Take 50 mg by mouth once daily.     potassium chloride 10 MEQ Tbsr  Commonly known as: KLOR-CON  Take 10 mEq by mouth once daily.     SYMBICORT 160-4.5 mcg/actuation Hfaa  Generic drug: budesonide-formoterol 160-4.5 mcg  Inhale 1 puff into the lungs as needed.     terazosin 5 MG capsule  Commonly known as: HYTRIN  Take 5 mg by mouth every evening.     VITAMIN D2 ORAL  Take 5,000 Units by mouth once daily.     warfarin 5 MG tablet  Commonly known as: COUMADIN  Take 7.5 mg by mouth Daily.        STOP taking these medications    clotrimazole-betamethasone 1-0.05% cream  Commonly known as: LOTRISONE     donepeziL 5 MG tablet  Commonly known as: ARICEPT            Indwelling Lines/Drains at time of discharge:   Lines/Drains/Airways      None               Physical Exam   Constitutional: He is oriented to person, place, and time.   Cardiovascular: Normal rate.   Neurological: He is alert and oriented to person, place, and time.     Time spent on the discharge of patient: 23  minutes         Albaro Laboy MD  Department of Hospital Medicine  [facility]

## 2022-04-08 NOTE — HOSPITAL COURSE
Patient admitted with chest pain  Serial Cardiac enzymes/EKG were normal  Pt had lexiscan stress test which came back as normal  Later pt was discharged to home

## 2022-04-08 NOTE — PLAN OF CARE
04/08/22 1509   Final Note   Assessment Type Final Discharge Note   Anticipated Discharge Disposition Home   Post-Acute Status   Discharge Delays None known at this time   Orders reviewed - pt discharging home this date with no case management needs noted.

## 2022-04-08 NOTE — PLAN OF CARE
Medicare Outpatient Observation Notice was signed, explained and given to patient/caregiver on 04/08/2022 at 9:39am     addressed any questions or concerns.    Medicare Outpatient Observation Notice will be scanned into patient's medical record

## 2022-04-22 ENCOUNTER — TELEPHONE (OUTPATIENT)
Dept: SURGERY | Facility: CLINIC | Age: 82
End: 2022-04-22
Payer: MEDICARE

## 2022-04-22 NOTE — TELEPHONE ENCOUNTER
Pt states he will call back when he knows whats going on also states he had ultrasound done on 0422/22 and does not have results yet advised to call back when ready to proceed with surgical consult

## 2022-04-26 ENCOUNTER — LAB VISIT (OUTPATIENT)
Dept: LAB | Facility: HOSPITAL | Age: 82
End: 2022-04-26
Attending: INTERNAL MEDICINE
Payer: MEDICARE

## 2022-04-26 DIAGNOSIS — Z79.899 ENCOUNTER FOR LONG-TERM (CURRENT) DRUG USE: ICD-10-CM

## 2022-04-26 DIAGNOSIS — M10.9 GOUT, UNSPECIFIED CAUSE, UNSPECIFIED CHRONICITY, UNSPECIFIED SITE: ICD-10-CM

## 2022-04-26 DIAGNOSIS — M05.79 RHEUMATOID ARTHRITIS INVOLVING MULTIPLE SITES WITH POSITIVE RHEUMATOID FACTOR: ICD-10-CM

## 2022-04-26 LAB
ALBUMIN SERPL BCP-MCNC: 4.4 G/DL (ref 3.5–5.2)
ALP SERPL-CCNC: 64 U/L (ref 55–135)
ALT SERPL W/O P-5'-P-CCNC: 19 U/L (ref 10–44)
ANION GAP SERPL CALC-SCNC: 10 MMOL/L (ref 8–16)
ANION GAP SERPL CALC-SCNC: 10 MMOL/L (ref 8–16)
AST SERPL-CCNC: 23 U/L (ref 10–40)
BASOPHILS # BLD AUTO: 0.06 K/UL (ref 0–0.2)
BASOPHILS NFR BLD: 1.3 % (ref 0–1.9)
BILIRUB SERPL-MCNC: 1.2 MG/DL (ref 0.1–1)
BUN SERPL-MCNC: 17 MG/DL (ref 8–23)
BUN SERPL-MCNC: 17 MG/DL (ref 8–23)
CALCIUM SERPL-MCNC: 9.1 MG/DL (ref 8.7–10.5)
CALCIUM SERPL-MCNC: 9.1 MG/DL (ref 8.7–10.5)
CHLORIDE SERPL-SCNC: 104 MMOL/L (ref 95–110)
CHLORIDE SERPL-SCNC: 104 MMOL/L (ref 95–110)
CO2 SERPL-SCNC: 27 MMOL/L (ref 23–29)
CO2 SERPL-SCNC: 27 MMOL/L (ref 23–29)
CREAT SERPL-MCNC: 0.9 MG/DL (ref 0.5–1.4)
CREAT SERPL-MCNC: 0.9 MG/DL (ref 0.5–1.4)
CRP SERPL-MCNC: 0.16 MG/DL
DIFFERENTIAL METHOD: ABNORMAL
EOSINOPHIL # BLD AUTO: 0.2 K/UL (ref 0–0.5)
EOSINOPHIL NFR BLD: 4.1 % (ref 0–8)
ERYTHROCYTE [DISTWIDTH] IN BLOOD BY AUTOMATED COUNT: 13.2 % (ref 11.5–14.5)
EST. GFR  (AFRICAN AMERICAN): >60 ML/MIN/1.73 M^2
EST. GFR  (AFRICAN AMERICAN): >60 ML/MIN/1.73 M^2
EST. GFR  (NON AFRICAN AMERICAN): >60 ML/MIN/1.73 M^2
EST. GFR  (NON AFRICAN AMERICAN): >60 ML/MIN/1.73 M^2
GLUCOSE SERPL-MCNC: 113 MG/DL (ref 70–110)
GLUCOSE SERPL-MCNC: 113 MG/DL (ref 70–110)
HCT VFR BLD AUTO: 37.9 % (ref 40–54)
HGB BLD-MCNC: 12.9 G/DL (ref 14–18)
IMM GRANULOCYTES # BLD AUTO: 0.01 K/UL (ref 0–0.04)
IMM GRANULOCYTES NFR BLD AUTO: 0.2 % (ref 0–0.5)
LYMPHOCYTES # BLD AUTO: 1.5 K/UL (ref 1–4.8)
LYMPHOCYTES NFR BLD: 32 % (ref 18–48)
MCH RBC QN AUTO: 30.8 PG (ref 27–31)
MCHC RBC AUTO-ENTMCNC: 34 G/DL (ref 32–36)
MCV RBC AUTO: 91 FL (ref 82–98)
MONOCYTES # BLD AUTO: 0.5 K/UL (ref 0.3–1)
MONOCYTES NFR BLD: 10.6 % (ref 4–15)
NEUTROPHILS # BLD AUTO: 2.4 K/UL (ref 1.8–7.7)
NEUTROPHILS NFR BLD: 51.8 % (ref 38–73)
NRBC BLD-RTO: 0 /100 WBC
PLATELET # BLD AUTO: 110 K/UL (ref 150–450)
PLATELET BLD QL SMEAR: ABNORMAL
PMV BLD AUTO: 10.2 FL (ref 9.2–12.9)
POTASSIUM SERPL-SCNC: 4.2 MMOL/L (ref 3.5–5.1)
POTASSIUM SERPL-SCNC: 4.2 MMOL/L (ref 3.5–5.1)
PROT SERPL-MCNC: 7.4 G/DL (ref 6–8.4)
RBC # BLD AUTO: 4.19 M/UL (ref 4.6–6.2)
SODIUM SERPL-SCNC: 141 MMOL/L (ref 136–145)
SODIUM SERPL-SCNC: 141 MMOL/L (ref 136–145)
URATE SERPL-MCNC: 6.2 MG/DL (ref 3.4–7)
URATE SERPL-MCNC: 6.2 MG/DL (ref 3.4–7)
WBC # BLD AUTO: 4.62 K/UL (ref 3.9–12.7)

## 2022-04-26 PROCEDURE — 85025 COMPLETE CBC W/AUTO DIFF WBC: CPT | Performed by: INTERNAL MEDICINE

## 2022-04-26 PROCEDURE — 84550 ASSAY OF BLOOD/URIC ACID: CPT | Performed by: INTERNAL MEDICINE

## 2022-04-26 PROCEDURE — 80053 COMPREHEN METABOLIC PANEL: CPT | Performed by: INTERNAL MEDICINE

## 2022-04-26 PROCEDURE — 86140 C-REACTIVE PROTEIN: CPT | Performed by: INTERNAL MEDICINE

## 2022-04-26 PROCEDURE — 36415 COLL VENOUS BLD VENIPUNCTURE: CPT | Performed by: INTERNAL MEDICINE

## 2022-05-22 PROCEDURE — 99285 EMERGENCY DEPT VISIT HI MDM: CPT

## 2022-05-23 ENCOUNTER — HOSPITAL ENCOUNTER (EMERGENCY)
Facility: HOSPITAL | Age: 82
Discharge: HOME | End: 2022-05-23
Attending: EMERGENCY MEDICINE
Payer: MEDICARE

## 2022-05-23 VITALS
BODY MASS INDEX: 31.15 KG/M2 | SYSTOLIC BLOOD PRESSURE: 134 MMHG | RESPIRATION RATE: 18 BRPM | TEMPERATURE: 98 F | DIASTOLIC BLOOD PRESSURE: 64 MMHG | OXYGEN SATURATION: 95 % | WEIGHT: 230 LBS | HEIGHT: 72 IN | HEART RATE: 61 BPM

## 2022-05-23 DIAGNOSIS — R19.7 DIARRHEA, UNSPECIFIED TYPE: Primary | ICD-10-CM

## 2022-05-23 DIAGNOSIS — R11.0 NAUSEA: ICD-10-CM

## 2022-05-23 LAB
ALBUMIN SERPL BCP-MCNC: 4 G/DL (ref 3.5–5.2)
ALP SERPL-CCNC: 57 U/L (ref 55–135)
ALT SERPL W/O P-5'-P-CCNC: 20 U/L (ref 10–44)
ANION GAP SERPL CALC-SCNC: 12 MMOL/L (ref 8–16)
AST SERPL-CCNC: 29 U/L (ref 10–40)
BASOPHILS # BLD AUTO: 0.03 K/UL (ref 0–0.2)
BASOPHILS NFR BLD: 0.7 % (ref 0–1.9)
BILIRUB SERPL-MCNC: 1 MG/DL (ref 0.1–1)
BILIRUB UR QL STRIP: NEGATIVE
BUN SERPL-MCNC: 13 MG/DL (ref 8–23)
CALCIUM SERPL-MCNC: 8.7 MG/DL (ref 8.7–10.5)
CHLORIDE SERPL-SCNC: 105 MMOL/L (ref 95–110)
CK SERPL-CCNC: 137 U/L (ref 20–200)
CLARITY UR: CLEAR
CO2 SERPL-SCNC: 24 MMOL/L (ref 23–29)
COLOR UR: YELLOW
CREAT SERPL-MCNC: 0.8 MG/DL (ref 0.5–1.4)
DIFFERENTIAL METHOD: ABNORMAL
EOSINOPHIL # BLD AUTO: 0.1 K/UL (ref 0–0.5)
EOSINOPHIL NFR BLD: 2.6 % (ref 0–8)
ERYTHROCYTE [DISTWIDTH] IN BLOOD BY AUTOMATED COUNT: 13.2 % (ref 11.5–14.5)
EST. GFR  (AFRICAN AMERICAN): >60 ML/MIN/1.73 M^2
EST. GFR  (NON AFRICAN AMERICAN): >60 ML/MIN/1.73 M^2
GLUCOSE SERPL-MCNC: 113 MG/DL (ref 70–110)
GLUCOSE UR QL STRIP: NEGATIVE
HCT VFR BLD AUTO: 37.1 % (ref 40–54)
HGB BLD-MCNC: 12.7 G/DL (ref 14–18)
HGB UR QL STRIP: NEGATIVE
IMM GRANULOCYTES # BLD AUTO: 0.01 K/UL (ref 0–0.04)
IMM GRANULOCYTES NFR BLD AUTO: 0.2 % (ref 0–0.5)
KETONES UR QL STRIP: NEGATIVE
LACTATE SERPL-SCNC: 1.2 MMOL/L (ref 0.5–1.9)
LEUKOCYTE ESTERASE UR QL STRIP: NEGATIVE
LIPASE SERPL-CCNC: 35 U/L (ref 4–60)
LYMPHOCYTES # BLD AUTO: 1.1 K/UL (ref 1–4.8)
LYMPHOCYTES NFR BLD: 25.8 % (ref 18–48)
MCH RBC QN AUTO: 30.8 PG (ref 27–31)
MCHC RBC AUTO-ENTMCNC: 34.2 G/DL (ref 32–36)
MCV RBC AUTO: 90 FL (ref 82–98)
MONOCYTES # BLD AUTO: 0.5 K/UL (ref 0.3–1)
MONOCYTES NFR BLD: 11.7 % (ref 4–15)
NEUTROPHILS # BLD AUTO: 2.5 K/UL (ref 1.8–7.7)
NEUTROPHILS NFR BLD: 59 % (ref 38–73)
NITRITE UR QL STRIP: NEGATIVE
NRBC BLD-RTO: 0 /100 WBC
PH UR STRIP: 6 [PH] (ref 5–8)
PLATELET # BLD AUTO: 111 K/UL (ref 150–450)
PMV BLD AUTO: 10.9 FL (ref 9.2–12.9)
POTASSIUM SERPL-SCNC: 3.4 MMOL/L (ref 3.5–5.1)
PROT SERPL-MCNC: 6.7 G/DL (ref 6–8.4)
PROT UR QL STRIP: NEGATIVE
RBC # BLD AUTO: 4.12 M/UL (ref 4.6–6.2)
SARS-COV-2 RDRP RESP QL NAA+PROBE: NEGATIVE
SODIUM SERPL-SCNC: 141 MMOL/L (ref 136–145)
SP GR UR STRIP: >1.03 (ref 1–1.03)
URN SPEC COLLECT METH UR: ABNORMAL
UROBILINOGEN UR STRIP-ACNC: NEGATIVE EU/DL
WBC # BLD AUTO: 4.27 K/UL (ref 3.9–12.7)

## 2022-05-23 PROCEDURE — 81003 URINALYSIS AUTO W/O SCOPE: CPT | Performed by: EMERGENCY MEDICINE

## 2022-05-23 PROCEDURE — 80053 COMPREHEN METABOLIC PANEL: CPT | Performed by: EMERGENCY MEDICINE

## 2022-05-23 PROCEDURE — 83690 ASSAY OF LIPASE: CPT | Performed by: EMERGENCY MEDICINE

## 2022-05-23 PROCEDURE — 63600175 PHARM REV CODE 636 W HCPCS: Performed by: EMERGENCY MEDICINE

## 2022-05-23 PROCEDURE — 83605 ASSAY OF LACTIC ACID: CPT | Performed by: EMERGENCY MEDICINE

## 2022-05-23 PROCEDURE — 25500020 PHARM REV CODE 255: Performed by: EMERGENCY MEDICINE

## 2022-05-23 PROCEDURE — 96361 HYDRATE IV INFUSION ADD-ON: CPT

## 2022-05-23 PROCEDURE — 82550 ASSAY OF CK (CPK): CPT | Performed by: EMERGENCY MEDICINE

## 2022-05-23 PROCEDURE — 85025 COMPLETE CBC W/AUTO DIFF WBC: CPT | Performed by: EMERGENCY MEDICINE

## 2022-05-23 PROCEDURE — 25000003 PHARM REV CODE 250: Performed by: EMERGENCY MEDICINE

## 2022-05-23 PROCEDURE — U0002 COVID-19 LAB TEST NON-CDC: HCPCS | Performed by: EMERGENCY MEDICINE

## 2022-05-23 PROCEDURE — 96374 THER/PROPH/DIAG INJ IV PUSH: CPT | Mod: 59

## 2022-05-23 RX ORDER — ONDANSETRON 2 MG/ML
4 INJECTION INTRAMUSCULAR; INTRAVENOUS
Status: COMPLETED | OUTPATIENT
Start: 2022-05-23 | End: 2022-05-23

## 2022-05-23 RX ORDER — POTASSIUM CHLORIDE 750 MG/1
10 CAPSULE, EXTENDED RELEASE ORAL ONCE
Status: COMPLETED | OUTPATIENT
Start: 2022-05-23 | End: 2022-05-23

## 2022-05-23 RX ORDER — ONDANSETRON 4 MG/1
4 TABLET, ORALLY DISINTEGRATING ORAL EVERY 6 HOURS PRN
Qty: 12 TABLET | Refills: 0 | Status: SHIPPED | OUTPATIENT
Start: 2022-05-23

## 2022-05-23 RX ADMIN — ONDANSETRON 4 MG: 2 INJECTION INTRAMUSCULAR; INTRAVENOUS at 01:05

## 2022-05-23 RX ADMIN — IOHEXOL 100 ML: 350 INJECTION, SOLUTION INTRAVENOUS at 01:05

## 2022-05-23 RX ADMIN — POTASSIUM CHLORIDE 10 MEQ: 750 CAPSULE, EXTENDED RELEASE ORAL at 03:05

## 2022-05-23 RX ADMIN — SODIUM CHLORIDE 1000 ML: 0.9 INJECTION, SOLUTION INTRAVENOUS at 01:05

## 2022-05-23 NOTE — ED NOTES
"PT STATES, "GURGLING IN MY STOMACH." DENIES ANY PAIN. REPORTS N/V/D 5 DAYS AGO BUT DENIES ANY SINCE THEN. NO ACUTE DISTRESS NOTED. STABLE CONDITION. FAMILY MEMBER AT BEDSIDE.   "

## 2022-05-23 NOTE — ED PROVIDER NOTES
Encounter Date: 5/22/2022       History     Chief Complaint   Patient presents with    Diarrhea     Pt states he was vomiting a few days ago and the last two days he has been having diarrhea.  Pt denies any dark stool or emesis.     81-year-old male presents complaining of nausea vomiting which started 3 days ago patient also reports that he has had diarrhea for the past 2 days patient denies dark stools patient has a past medical history of diabetes, hypertension, atrial fibrillation, chronic kidney disease patient reports that he feels a bubbling sensation in his abdomen which he feels most strongly in the left upper quadrant patient reports he is passing gas normally patient reports normal urination patient has no other acute complaints at this time.        Review of patient's allergies indicates:   Allergen Reactions    Statins-hmg-coa reductase inhibitors      Past Medical History:   Diagnosis Date    Arthritis     Atrial fibrillation     CKD (chronic kidney disease)     COPD (chronic obstructive pulmonary disease)     Diabetes mellitus, type 2     Gout     Heart block     Hypertension     Pancreatitis     Peripheral vascular disease      Past Surgical History:   Procedure Laterality Date    APPENDECTOMY      CARDIAC PACEMAKER PLACEMENT  2010    EYE SURGERY      INGUINAL HERNIA REPAIR Left      Family History   Problem Relation Age of Onset    Diabetes Mellitus Mother      Social History     Tobacco Use    Smoking status: Former Smoker    Smokeless tobacco: Never Used   Substance Use Topics    Alcohol use: No    Drug use: No     Review of Systems   Constitutional: Negative for fever.   HENT: Negative for congestion, rhinorrhea, sore throat and trouble swallowing.    Eyes: Negative for visual disturbance.   Respiratory: Negative for cough, chest tightness, shortness of breath and wheezing.    Cardiovascular: Negative for chest pain, palpitations and leg swelling.   Gastrointestinal: Positive  for diarrhea, nausea and vomiting. Negative for abdominal distention, abdominal pain and constipation.   Genitourinary: Negative for difficulty urinating, dysuria, flank pain and frequency.   Musculoskeletal: Negative for arthralgias, back pain, joint swelling and neck pain.   Skin: Negative for color change and rash.   Neurological: Negative for dizziness, syncope, speech difficulty, weakness, numbness and headaches.   All other systems reviewed and are negative.      Physical Exam     Initial Vitals [05/23/22 0016]   BP Pulse Resp Temp SpO2   (!) 144/74 74 18 98.2 °F (36.8 °C) 97 %      MAP       --         Physical Exam    Nursing note and vitals reviewed.  Constitutional: He appears well-developed and well-nourished. He is not diaphoretic. No distress.   HENT:   Head: Normocephalic and atraumatic.   Right Ear: External ear normal.   Left Ear: External ear normal.   Nose: Nose normal.   Mouth/Throat: Oropharynx is clear and moist. No oropharyngeal exudate.   Eyes: Conjunctivae and EOM are normal. Pupils are equal, round, and reactive to light. Right eye exhibits no discharge. Left eye exhibits no discharge. No scleral icterus.   Neck: Neck supple. No thyromegaly present. No tracheal deviation present. No JVD present.   Normal range of motion.  Cardiovascular: Normal rate, regular rhythm, normal heart sounds and intact distal pulses. Exam reveals no gallop and no friction rub.    No murmur heard.  Pulmonary/Chest: Breath sounds normal. No stridor. No respiratory distress. He has no wheezes. He has no rhonchi. He has no rales. He exhibits no tenderness.   Abdominal: Abdomen is soft. Bowel sounds are normal. He exhibits no distension and no mass. There is no abdominal tenderness. There is no rebound and no guarding.   Musculoskeletal:         General: No tenderness or edema. Normal range of motion.      Cervical back: Normal range of motion and neck supple.     Lymphadenopathy:     He has no cervical adenopathy.    Neurological: He is alert and oriented to person, place, and time. He has normal strength. No cranial nerve deficit or sensory deficit.   Skin: Skin is warm and dry. No rash noted. No erythema.         ED Course   Procedures  Labs Reviewed   CBC W/ AUTO DIFFERENTIAL - Abnormal; Notable for the following components:       Result Value    RBC 4.12 (*)     Hemoglobin 12.7 (*)     Hematocrit 37.1 (*)     Platelets 111 (*)     All other components within normal limits   COMPREHENSIVE METABOLIC PANEL - Abnormal; Notable for the following components:    Potassium 3.4 (*)     Glucose 113 (*)     All other components within normal limits   URINALYSIS, REFLEX TO URINE CULTURE - Abnormal; Notable for the following components:    Specific Gravity, UA >1.030 (*)     All other components within normal limits    Narrative:     Specimen Source->Urine   CULTURE, STOOL   CLOSTRIDIUM DIFFICILE   SARS-COV-2 RNA AMPLIFICATION, QUAL   LIPASE   LACTIC ACID, PLASMA   CK   STOOL EXAM-OVA,CYSTS,PARASITES   WBC, STOOL   OCCULT BLOOD X 1, STOOL          Imaging Results          CT Abdomen Pelvis With Contrast (Final result)  Result time 05/23/22 02:29:27    Final result by Eduardo Kaplan MD (05/23/22 02:29:27)                 Narrative:    EXAM DESCRIPTION:  CT ABDOMEN PELVIS WITH CONTRAST  RadLex: CT ABDOMEN PELVIS WITH IV CONTRAST    CLINICAL HISTORY:  81 years  Male;  Bowel obstruction suspected    TECHNOLOGIST NOTES:    TECHNIQUE: Helical CT imaging was obtained of the abdomen and pelvis with multiplanar reconstructions. This exam was performed according to our departmental dose-optimization program, which includes automated exposure control, adjustment of the mA and/or kV according to patient size and/or use of iterative reconstruction techniques.    COMPARISON: None currently available    FINDINGS:  Abdomen:  No evidence of acute disease in the visualized lung bases. There is calcification in the coronary arteries. There are cardiac pacing  leads.  The liver, spleen, pancreas, adrenal glands and kidneys show no acute abnormality. No evidence of acute pancreatitis.    There are multiple small calcified gallstones. There is no gallbladder wall thickening. No pericholecystic fluid.  There is no gross biliary duct dilatation.  No significant hydronephrosis bilaterally.  Incidental left renal cyst. There is a small fat-containing umbilical hernia.    No free air.    There is no significant free fluid.    There is no significant aneurysmal enlargement of the abdominal aorta.    Pelvis:  There is no evidence of bowel obstruction.    No herniated bowel loops.  . No focal inflammatory changes . There is colonic diverticulosis but no evidence of acute diverticulitis.  Evaluation of the bowel is limited when there is no oral contrast or when the bowel is incompletely opacified with enteric contrast.. There is no significant free fluid in the pelvis. The bladder is grossly unremarkable.  Small amount of fat in the inguinal canals bilaterally. There is left L5 spondylolysis. The prostate is 5.1 cm.        IMPRESSION:  1.  No evidence of an acute process.  2.  No bowel obstruction, herniated bowel loops or focal inflammatory changes.  3.  Cholelithiasis  4.  Colonic diverticulosis but no acute diverticulitis    Electronically signed by:  Eduardo Kaplan MD  5/23/2022 2:29 AM CDT Workstation: [id number]                             X-Ray Chest AP Portable (In process)                  Medications   potassium chloride CR capsule 10 mEq (has no administration in time range)   sodium chloride 0.9% bolus 1,000 mL (1,000 mLs Intravenous New Bag 5/23/22 0113)   ondansetron injection 4 mg (4 mg Intravenous Given 5/23/22 0115)   iohexoL (OMNIPAQUE 350) injection 100 mL (100 mLs Intravenous Given 5/23/22 0154)     Medical Decision Making:   History:   Old Medical Records: I decided to obtain old medical records.  Initial Assessment:   Emergent evaluation of a 81-year-old male  presenting with abdominal discomfort nausea vomiting and diarrhea differential diagnosis includes gastritis, electrolyte abnormality, endocrine dysfunction, dehydration, obstruction, perforation, infection            Attending Attestation:             Attending ED Notes:   Patient's imaging shows no significant acute abnormalities patient's blood work shows mildly low potassium which was supplemented in the emergency department patient is tolerating p.o. in ER, patient is advised to hydrate well he will be started on a course of Zofran to take as needed patient is to follow up with GI in the next 2-3 days for further evaluation and management patient is cautioned to return immediately to the emergency department for any worsening or any further concerns.    I had a detailed discussion with the patient and/or guardian regarding: The historical points, exam findings, and diagnostic results supporting the discharge diagnosis, lab results, pertinent radiology results, and the need for outpatient follow-up, for definitive care with a family practitioner and to return to the emergency department if symptoms worsen or persist or if there are any questions or concerns that arise at home. All questions have been answered in detail. Strict return to Emergency Department precautions have been provided.     A dictation software program was used for this note.  Please expect some simple typographical  errors in this note.     This patient was seen during the context of the Covid 19 global pandemic where local, state, hospital guidelines, were followed to the best of ability given the circumstances of the pandemic.                   Clinical Impression:   Final diagnoses:  [R19.7] Diarrhea, unspecified type (Primary)  [R11.0] Nausea          ED Disposition Condition    Discharge Stable        ED Prescriptions     Medication Sig Dispense Start Date End Date Auth. Provider    ondansetron (ZOFRAN-ODT) 4 MG TbDL Take 1 tablet (4 mg  total) by mouth every 6 (six) hours as needed. 12 tablet 5/23/2022  Kameron Wiley MD        Follow-up Information     Follow up With Specialties Details Why Contact Info Additional Information    [facility] - Emergency Dept Emergency Medicine  If symptoms worsen [address] [facility] [address]  [phone] 1st floor    Puneet CORONA Rocky, MD Gastroenterology Schedule an appointment as soon as possible for a visit in 2 days  [address] [facility] [address]  [phone]              Kameron Wiley MD  05/23/22 0306       Kameron Wiley MD  05/23/22 0304

## 2022-05-24 ENCOUNTER — OFFICE VISIT (OUTPATIENT)
Dept: SURGERY | Facility: CLINIC | Age: 82
End: 2022-05-24
Payer: MEDICARE

## 2022-05-24 VITALS
WEIGHT: 229.75 LBS | HEART RATE: 64 BPM | TEMPERATURE: 99 F | DIASTOLIC BLOOD PRESSURE: 69 MMHG | HEIGHT: 72 IN | BODY MASS INDEX: 31.12 KG/M2 | SYSTOLIC BLOOD PRESSURE: 139 MMHG

## 2022-05-24 DIAGNOSIS — K80.20 CALCULUS OF GALLBLADDER WITHOUT CHOLECYSTITIS WITHOUT OBSTRUCTION: Primary | ICD-10-CM

## 2022-05-24 PROCEDURE — 3075F SYST BP GE 130 - 139MM HG: CPT | Mod: CPTII,S$GLB,, | Performed by: SURGERY

## 2022-05-24 PROCEDURE — 1159F MED LIST DOCD IN RCRD: CPT | Mod: CPTII,S$GLB,, | Performed by: SURGERY

## 2022-05-24 PROCEDURE — 99999 PR PBB SHADOW E&M-EST. PATIENT-LVL III: CPT | Mod: PBBFAC,,, | Performed by: SURGERY

## 2022-05-24 PROCEDURE — 3288F PR FALLS RISK ASSESSMENT DOCUMENTED: ICD-10-PCS | Mod: CPTII,S$GLB,, | Performed by: SURGERY

## 2022-05-24 PROCEDURE — 1159F PR MEDICATION LIST DOCUMENTED IN MEDICAL RECORD: ICD-10-PCS | Mod: CPTII,S$GLB,, | Performed by: SURGERY

## 2022-05-24 PROCEDURE — 1101F PT FALLS ASSESS-DOCD LE1/YR: CPT | Mod: CPTII,S$GLB,, | Performed by: SURGERY

## 2022-05-24 PROCEDURE — 1101F PR PT FALLS ASSESS DOC 0-1 FALLS W/OUT INJ PAST YR: ICD-10-PCS | Mod: CPTII,S$GLB,, | Performed by: SURGERY

## 2022-05-24 PROCEDURE — 1160F RVW MEDS BY RX/DR IN RCRD: CPT | Mod: CPTII,S$GLB,, | Performed by: SURGERY

## 2022-05-24 PROCEDURE — 3075F PR MOST RECENT SYSTOLIC BLOOD PRESS GE 130-139MM HG: ICD-10-PCS | Mod: CPTII,S$GLB,, | Performed by: SURGERY

## 2022-05-24 PROCEDURE — 3078F DIAST BP <80 MM HG: CPT | Mod: CPTII,S$GLB,, | Performed by: SURGERY

## 2022-05-24 PROCEDURE — 1126F PR PAIN SEVERITY QUANTIFIED, NO PAIN PRESENT: ICD-10-PCS | Mod: CPTII,S$GLB,, | Performed by: SURGERY

## 2022-05-24 PROCEDURE — 3288F FALL RISK ASSESSMENT DOCD: CPT | Mod: CPTII,S$GLB,, | Performed by: SURGERY

## 2022-05-24 PROCEDURE — 3078F PR MOST RECENT DIASTOLIC BLOOD PRESSURE < 80 MM HG: ICD-10-PCS | Mod: CPTII,S$GLB,, | Performed by: SURGERY

## 2022-05-24 PROCEDURE — 99203 PR OFFICE/OUTPT VISIT, NEW, LEVL III, 30-44 MIN: ICD-10-PCS | Mod: S$GLB,,, | Performed by: SURGERY

## 2022-05-24 PROCEDURE — 1126F AMNT PAIN NOTED NONE PRSNT: CPT | Mod: CPTII,S$GLB,, | Performed by: SURGERY

## 2022-05-24 PROCEDURE — 99203 OFFICE O/P NEW LOW 30 MIN: CPT | Mod: S$GLB,,, | Performed by: SURGERY

## 2022-05-24 PROCEDURE — 1160F PR REVIEW ALL MEDS BY PRESCRIBER/CLIN PHARMACIST DOCUMENTED: ICD-10-PCS | Mod: CPTII,S$GLB,, | Performed by: SURGERY

## 2022-05-24 PROCEDURE — 99999 PR PBB SHADOW E&M-EST. PATIENT-LVL III: ICD-10-PCS | Mod: PBBFAC,,, | Performed by: SURGERY

## 2022-05-24 RX ORDER — CHOLECALCIFEROL (VITAMIN D3) 25 MCG
5000 TABLET ORAL
COMMUNITY

## 2022-06-03 NOTE — PROGRESS NOTES
Subjective:       Patient ID: Kennedy Thomas is a 81 y.o. male.    Chief Complaint: Gall Bladder Problem      HPI 81-year-old male referred for evaluation of cholelithiasis.  Patient had episode of nausea vomiting diarrhea and CT scan revealed evidence of cholelithiasis.  All of the symptoms have now resolved.  Laboratory studies were unremarkable.    Past Medical History:   Diagnosis Date    Arthritis     Atrial fibrillation     CKD (chronic kidney disease)     COPD (chronic obstructive pulmonary disease)     Diabetes mellitus, type 2     Gout     Heart block     Hypertension     Pancreatitis     Peripheral vascular disease      Past Surgical History:   Procedure Laterality Date    APPENDECTOMY      CARDIAC PACEMAKER PLACEMENT  2010    EYE SURGERY      INGUINAL HERNIA REPAIR Left          Current Outpatient Medications:     allopurinoL (ZYLOPRIM) 100 MG tablet, TAKE 1 TABLET(100 MG) BY MOUTH EVERY DAY (Patient taking differently: Take 100 mg by mouth once daily.), Disp: 90 tablet, Rfl: 3    benazepril (LOTENSIN) 40 MG tablet, Take 40 mg by mouth once daily., Disp: , Rfl:     bumetanide (BUMEX) 1 MG tablet, Take 1 mg by mouth once daily., Disp: , Rfl:     hydrOXYchloroQUINE (PLAQUENIL) 200 mg tablet, TAKE 1 TABLET(200 MG) BY MOUTH EVERY DAY, Disp: 90 tablet, Rfl: 1    metoprolol succinate (TOPROL-XL) 50 MG 24 hr tablet, Take 50 mg by mouth once daily., Disp: , Rfl:     potassium chloride (KLOR-CON) 10 MEQ TbSR, Take 10 mEq by mouth once daily., Disp: , Rfl:     SYMBICORT 160-4.5 mcg/actuation HFAA, Inhale 1 puff into the lungs as needed., Disp: , Rfl: 0    terazosin (HYTRIN) 5 MG capsule, Take 5 mg by mouth every evening., Disp: , Rfl:     vitamin D (VITAMIN D3) 1000 units Tab, Take 5,000 Units by mouth., Disp: , Rfl:     warfarin (COUMADIN) 5 MG tablet, Take 7.5 mg by mouth Daily., Disp: , Rfl:     ondansetron (ZOFRAN-ODT) 4 MG TbDL, Take 1 tablet (4 mg total) by mouth every 6 (six) hours  as needed. (Patient not taking: Reported on 5/24/2022), Disp: 12 tablet, Rfl: 0    Review of patient's allergies indicates:   Allergen Reactions    Statins-hmg-coa reductase inhibitors        Family History   Problem Relation Age of Onset    Diabetes Mellitus Mother      Social History     Socioeconomic History    Marital status:    Tobacco Use    Smoking status: Former Smoker    Smokeless tobacco: Never Used   Substance and Sexual Activity    Alcohol use: No    Drug use: No       Review of Systems   Respiratory: Negative.    Cardiovascular: Negative.    Gastrointestinal: Negative.      Objective:     Physical Exam  Constitutional:       General: He is not in acute distress.     Appearance: He is well-developed.   HENT:      Head: Normocephalic and atraumatic.   Eyes:      General: No scleral icterus.     Conjunctiva/sclera: Conjunctivae normal.      Pupils: Pupils are equal, round, and reactive to light.   Neck:      Thyroid: No thyromegaly.   Cardiovascular:      Rate and Rhythm: Normal rate and regular rhythm.      Heart sounds: Normal heart sounds. No murmur heard.  Pulmonary:      Effort: Pulmonary effort is normal. No respiratory distress.      Breath sounds: Normal breath sounds. No wheezing or rales.   Abdominal:      General: Bowel sounds are normal. There is no distension.      Palpations: Abdomen is soft.      Tenderness: There is no abdominal tenderness.      Hernia: No hernia is present.   Musculoskeletal:         General: No tenderness. Normal range of motion.      Cervical back: Normal range of motion and neck supple.   Lymphadenopathy:      Cervical: No cervical adenopathy.   Skin:     General: Skin is warm and dry.      Findings: No erythema or rash.   Neurological:      Mental Status: He is alert and oriented to person, place, and time.   Psychiatric:         Behavior: Behavior normal.         Judgment: Judgment normal.       Assessment:     Cholelithiasis  Plan:      1. Discussed the  option of laparoscopic cholecystectomy versus observation.  The patient is asymptomatic at this time.  He would like to observe for now.

## 2022-07-07 ENCOUNTER — HOSPITAL ENCOUNTER (INPATIENT)
Facility: HOSPITAL | Age: 82
LOS: 2 days | Discharge: HOME | DRG: 419 | End: 2022-07-13
Attending: EMERGENCY MEDICINE | Admitting: INTERNAL MEDICINE
Payer: MEDICARE

## 2022-07-07 DIAGNOSIS — K80.51: ICD-10-CM

## 2022-07-07 DIAGNOSIS — R10.13 EPIGASTRIC PAIN: ICD-10-CM

## 2022-07-07 DIAGNOSIS — K80.51 CALCULUS OF BILE DUCT WITHOUT CHOLECYSTITIS WITH OBSTRUCTION: ICD-10-CM

## 2022-07-07 DIAGNOSIS — R10.10 PAIN OF UPPER ABDOMEN: Primary | ICD-10-CM

## 2022-07-07 DIAGNOSIS — E80.6 HYPERBILIRUBINEMIA: ICD-10-CM

## 2022-07-07 LAB
ALBUMIN SERPL BCP-MCNC: 4.4 G/DL (ref 3.5–5.2)
ALP SERPL-CCNC: 98 U/L (ref 55–135)
ALT SERPL W/O P-5'-P-CCNC: 98 U/L (ref 10–44)
ANION GAP SERPL CALC-SCNC: 9 MMOL/L (ref 8–16)
AST SERPL-CCNC: 147 U/L (ref 10–40)
BASOPHILS # BLD AUTO: 0.04 K/UL (ref 0–0.2)
BASOPHILS NFR BLD: 0.8 % (ref 0–1.9)
BILIRUB SERPL-MCNC: 4.5 MG/DL (ref 0.1–1)
BUN SERPL-MCNC: 18 MG/DL (ref 8–23)
CALCIUM SERPL-MCNC: 9.2 MG/DL (ref 8.7–10.5)
CHLORIDE SERPL-SCNC: 102 MMOL/L (ref 95–110)
CO2 SERPL-SCNC: 27 MMOL/L (ref 23–29)
CREAT SERPL-MCNC: 0.9 MG/DL (ref 0.5–1.4)
DIFFERENTIAL METHOD: ABNORMAL
EOSINOPHIL # BLD AUTO: 0.1 K/UL (ref 0–0.5)
EOSINOPHIL NFR BLD: 1 % (ref 0–8)
ERYTHROCYTE [DISTWIDTH] IN BLOOD BY AUTOMATED COUNT: 13.4 % (ref 11.5–14.5)
EST. GFR  (AFRICAN AMERICAN): >60 ML/MIN/1.73 M^2
EST. GFR  (NON AFRICAN AMERICAN): >60 ML/MIN/1.73 M^2
GLUCOSE SERPL-MCNC: 119 MG/DL (ref 70–110)
HCT VFR BLD AUTO: 35.3 % (ref 40–54)
HGB BLD-MCNC: 12 G/DL (ref 14–18)
IMM GRANULOCYTES # BLD AUTO: 0.01 K/UL (ref 0–0.04)
IMM GRANULOCYTES NFR BLD AUTO: 0.2 % (ref 0–0.5)
LIPASE SERPL-CCNC: 73 U/L (ref 4–60)
LYMPHOCYTES # BLD AUTO: 0.8 K/UL (ref 1–4.8)
LYMPHOCYTES NFR BLD: 16 % (ref 18–48)
MCH RBC QN AUTO: 31.4 PG (ref 27–31)
MCHC RBC AUTO-ENTMCNC: 34 G/DL (ref 32–36)
MCV RBC AUTO: 92 FL (ref 82–98)
MONOCYTES # BLD AUTO: 0.5 K/UL (ref 0.3–1)
MONOCYTES NFR BLD: 10 % (ref 4–15)
NEUTROPHILS # BLD AUTO: 3.5 K/UL (ref 1.8–7.7)
NEUTROPHILS NFR BLD: 72 % (ref 38–73)
NRBC BLD-RTO: 0 /100 WBC
PLATELET # BLD AUTO: 104 K/UL (ref 150–450)
PMV BLD AUTO: 10.4 FL (ref 9.2–12.9)
POTASSIUM SERPL-SCNC: 3.7 MMOL/L (ref 3.5–5.1)
PROT SERPL-MCNC: 7.3 G/DL (ref 6–8.4)
RBC # BLD AUTO: 3.82 M/UL (ref 4.6–6.2)
SODIUM SERPL-SCNC: 138 MMOL/L (ref 136–145)
TROPONIN I SERPL DL<=0.01 NG/ML-MCNC: <0.03 NG/ML
WBC # BLD AUTO: 4.81 K/UL (ref 3.9–12.7)

## 2022-07-07 PROCEDURE — 85025 COMPLETE CBC W/AUTO DIFF WBC: CPT | Performed by: PHYSICIAN ASSISTANT

## 2022-07-07 PROCEDURE — 80053 COMPREHEN METABOLIC PANEL: CPT | Performed by: PHYSICIAN ASSISTANT

## 2022-07-07 PROCEDURE — 82962 GLUCOSE BLOOD TEST: CPT

## 2022-07-07 PROCEDURE — 83690 ASSAY OF LIPASE: CPT | Performed by: PHYSICIAN ASSISTANT

## 2022-07-07 PROCEDURE — 96375 TX/PRO/DX INJ NEW DRUG ADDON: CPT

## 2022-07-07 PROCEDURE — 99285 EMERGENCY DEPT VISIT HI MDM: CPT | Mod: 25

## 2022-07-07 PROCEDURE — 84484 ASSAY OF TROPONIN QUANT: CPT | Performed by: PHYSICIAN ASSISTANT

## 2022-07-08 PROBLEM — K80.51 CALCULUS OF BILE DUCT WITHOUT CHOLECYSTITIS WITH OBSTRUCTION: Status: ACTIVE | Noted: 2022-07-08

## 2022-07-08 LAB
ALBUMIN SERPL BCP-MCNC: 4.2 G/DL (ref 3.5–5.2)
ALP SERPL-CCNC: 101 U/L (ref 55–135)
ALT SERPL W/O P-5'-P-CCNC: 137 U/L (ref 10–44)
ANION GAP SERPL CALC-SCNC: 4 MMOL/L (ref 8–16)
AST SERPL-CCNC: 185 U/L (ref 10–40)
BACTERIA #/AREA URNS HPF: NEGATIVE /HPF
BASOPHILS # BLD AUTO: 0.03 K/UL (ref 0–0.2)
BASOPHILS NFR BLD: 0.6 % (ref 0–1.9)
BILIRUB SERPL-MCNC: 6.1 MG/DL (ref 0.1–1)
BILIRUB UR QL STRIP: ABNORMAL
BUN SERPL-MCNC: 18 MG/DL (ref 8–23)
CALCIUM SERPL-MCNC: 8.8 MG/DL (ref 8.7–10.5)
CHLORIDE SERPL-SCNC: 103 MMOL/L (ref 95–110)
CLARITY UR: CLEAR
CO2 SERPL-SCNC: 30 MMOL/L (ref 23–29)
COLOR UR: YELLOW
CREAT SERPL-MCNC: 0.9 MG/DL (ref 0.5–1.4)
DIFFERENTIAL METHOD: ABNORMAL
EOSINOPHIL # BLD AUTO: 0.1 K/UL (ref 0–0.5)
EOSINOPHIL NFR BLD: 1.1 % (ref 0–8)
ERYTHROCYTE [DISTWIDTH] IN BLOOD BY AUTOMATED COUNT: 13.5 % (ref 11.5–14.5)
EST. GFR  (AFRICAN AMERICAN): >60 ML/MIN/1.73 M^2
EST. GFR  (NON AFRICAN AMERICAN): >60 ML/MIN/1.73 M^2
GLUCOSE SERPL-MCNC: 104 MG/DL (ref 70–110)
GLUCOSE SERPL-MCNC: 107 MG/DL (ref 70–110)
GLUCOSE SERPL-MCNC: 87 MG/DL (ref 70–110)
GLUCOSE UR QL STRIP: NEGATIVE
HCT VFR BLD AUTO: 35 % (ref 40–54)
HGB BLD-MCNC: 11.8 G/DL (ref 14–18)
HGB UR QL STRIP: NEGATIVE
HYALINE CASTS #/AREA URNS LPF: 5 /LPF
IMM GRANULOCYTES # BLD AUTO: 0.01 K/UL (ref 0–0.04)
IMM GRANULOCYTES NFR BLD AUTO: 0.2 % (ref 0–0.5)
INR PPP: 2.5
KETONES UR QL STRIP: NEGATIVE
LEUKOCYTE ESTERASE UR QL STRIP: NEGATIVE
LYMPHOCYTES # BLD AUTO: 0.9 K/UL (ref 1–4.8)
LYMPHOCYTES NFR BLD: 19.4 % (ref 18–48)
MCH RBC QN AUTO: 31.5 PG (ref 27–31)
MCHC RBC AUTO-ENTMCNC: 33.7 G/DL (ref 32–36)
MCV RBC AUTO: 93 FL (ref 82–98)
MICROSCOPIC COMMENT: ABNORMAL
MONOCYTES # BLD AUTO: 0.5 K/UL (ref 0.3–1)
MONOCYTES NFR BLD: 11.2 % (ref 4–15)
NEUTROPHILS # BLD AUTO: 3.1 K/UL (ref 1.8–7.7)
NEUTROPHILS NFR BLD: 67.5 % (ref 38–73)
NITRITE UR QL STRIP: NEGATIVE
NRBC BLD-RTO: 0 /100 WBC
PH UR STRIP: 6 [PH] (ref 5–8)
PLATELET # BLD AUTO: 95 K/UL (ref 150–450)
PMV BLD AUTO: 10.7 FL (ref 9.2–12.9)
POTASSIUM SERPL-SCNC: 3.8 MMOL/L (ref 3.5–5.1)
PROT SERPL-MCNC: 7.2 G/DL (ref 6–8.4)
PROT UR QL STRIP: ABNORMAL
PROTHROMBIN TIME: 25.5 SEC (ref 11.4–13.7)
RBC # BLD AUTO: 3.75 M/UL (ref 4.6–6.2)
RBC #/AREA URNS HPF: 4 /HPF (ref 0–4)
SARS-COV-2 RDRP RESP QL NAA+PROBE: NEGATIVE
SODIUM SERPL-SCNC: 137 MMOL/L (ref 136–145)
SP GR UR STRIP: >1.03 (ref 1–1.03)
SQUAMOUS #/AREA URNS HPF: 1 /HPF
URN SPEC COLLECT METH UR: ABNORMAL
UROBILINOGEN UR STRIP-ACNC: >=8 EU/DL
WBC # BLD AUTO: 4.65 K/UL (ref 3.9–12.7)
WBC #/AREA URNS HPF: 0 /HPF (ref 0–5)

## 2022-07-08 PROCEDURE — 85025 COMPLETE CBC W/AUTO DIFF WBC: CPT | Performed by: INTERNAL MEDICINE

## 2022-07-08 PROCEDURE — 80053 COMPREHEN METABOLIC PANEL: CPT | Performed by: INTERNAL MEDICINE

## 2022-07-08 PROCEDURE — 25000003 PHARM REV CODE 250: Performed by: INTERNAL MEDICINE

## 2022-07-08 PROCEDURE — 80074 ACUTE HEPATITIS PANEL: CPT | Performed by: EMERGENCY MEDICINE

## 2022-07-08 PROCEDURE — 63600175 PHARM REV CODE 636 W HCPCS: Performed by: EMERGENCY MEDICINE

## 2022-07-08 PROCEDURE — 25500020 PHARM REV CODE 255: Performed by: EMERGENCY MEDICINE

## 2022-07-08 PROCEDURE — 99214 PR OFFICE/OUTPT VISIT, EST, LEVL IV, 30-39 MIN: ICD-10-PCS | Mod: ,,, | Performed by: SURGERY

## 2022-07-08 PROCEDURE — 93010 EKG 12-LEAD: ICD-10-PCS | Mod: ,,, | Performed by: INTERNAL MEDICINE

## 2022-07-08 PROCEDURE — 81001 URINALYSIS AUTO W/SCOPE: CPT | Performed by: PHYSICIAN ASSISTANT

## 2022-07-08 PROCEDURE — 93010 ELECTROCARDIOGRAM REPORT: CPT | Mod: ,,, | Performed by: INTERNAL MEDICINE

## 2022-07-08 PROCEDURE — U0002 COVID-19 LAB TEST NON-CDC: HCPCS | Performed by: EMERGENCY MEDICINE

## 2022-07-08 PROCEDURE — G0378 HOSPITAL OBSERVATION PER HR: HCPCS

## 2022-07-08 PROCEDURE — 85610 PROTHROMBIN TIME: CPT | Performed by: INTERNAL MEDICINE

## 2022-07-08 PROCEDURE — 99214 OFFICE O/P EST MOD 30 MIN: CPT | Mod: ,,, | Performed by: SURGERY

## 2022-07-08 PROCEDURE — 93005 ELECTROCARDIOGRAM TRACING: CPT | Performed by: INTERNAL MEDICINE

## 2022-07-08 RX ORDER — TALC
6 POWDER (GRAM) TOPICAL NIGHTLY PRN
Status: DISCONTINUED | OUTPATIENT
Start: 2022-07-08 | End: 2022-07-08

## 2022-07-08 RX ORDER — METOPROLOL SUCCINATE 50 MG/1
50 TABLET, EXTENDED RELEASE ORAL DAILY
Status: DISCONTINUED | OUTPATIENT
Start: 2022-07-08 | End: 2022-07-08

## 2022-07-08 RX ORDER — WARFARIN SODIUM 5 MG/1
7.5 TABLET ORAL
Status: ON HOLD | COMMUNITY
End: 2022-07-13 | Stop reason: HOSPADM

## 2022-07-08 RX ORDER — ACETAMINOPHEN 325 MG/1
650 TABLET ORAL EVERY 8 HOURS PRN
Status: DISCONTINUED | OUTPATIENT
Start: 2022-07-08 | End: 2022-07-13 | Stop reason: HOSPADM

## 2022-07-08 RX ORDER — MAGNESIUM SULFATE HEPTAHYDRATE 40 MG/ML
2 INJECTION, SOLUTION INTRAVENOUS
Status: DISCONTINUED | OUTPATIENT
Start: 2022-07-08 | End: 2022-07-13 | Stop reason: HOSPADM

## 2022-07-08 RX ORDER — LANOLIN ALCOHOL/MO/W.PET/CERES
800 CREAM (GRAM) TOPICAL
Status: DISCONTINUED | OUTPATIENT
Start: 2022-07-08 | End: 2022-07-13 | Stop reason: HOSPADM

## 2022-07-08 RX ORDER — TAMSULOSIN HYDROCHLORIDE 0.4 MG/1
0.4 CAPSULE ORAL DAILY
Status: DISCONTINUED | OUTPATIENT
Start: 2022-07-08 | End: 2022-07-13 | Stop reason: HOSPADM

## 2022-07-08 RX ORDER — METOPROLOL SUCCINATE 25 MG/1
25 TABLET, EXTENDED RELEASE ORAL 2 TIMES DAILY
Status: DISCONTINUED | OUTPATIENT
Start: 2022-07-08 | End: 2022-07-13 | Stop reason: HOSPADM

## 2022-07-08 RX ORDER — ONDANSETRON 2 MG/ML
4 INJECTION INTRAMUSCULAR; INTRAVENOUS EVERY 8 HOURS PRN
Status: DISCONTINUED | OUTPATIENT
Start: 2022-07-08 | End: 2022-07-11

## 2022-07-08 RX ORDER — TALC
6 POWDER (GRAM) TOPICAL NIGHTLY PRN
Status: DISCONTINUED | OUTPATIENT
Start: 2022-07-08 | End: 2022-07-13 | Stop reason: HOSPADM

## 2022-07-08 RX ORDER — ALLOPURINOL 100 MG/1
100 TABLET ORAL DAILY
Status: DISCONTINUED | OUTPATIENT
Start: 2022-07-08 | End: 2022-07-13 | Stop reason: HOSPADM

## 2022-07-08 RX ORDER — HYDROMORPHONE HYDROCHLORIDE 1 MG/ML
0.5 INJECTION, SOLUTION INTRAMUSCULAR; INTRAVENOUS; SUBCUTANEOUS EVERY 6 HOURS PRN
Status: DISCONTINUED | OUTPATIENT
Start: 2022-07-08 | End: 2022-07-08

## 2022-07-08 RX ORDER — POTASSIUM CHLORIDE 20 MEQ/1
40 TABLET, EXTENDED RELEASE ORAL
Status: DISCONTINUED | OUTPATIENT
Start: 2022-07-08 | End: 2022-07-13 | Stop reason: HOSPADM

## 2022-07-08 RX ORDER — MAGNESIUM SULFATE 1 G/100ML
1 INJECTION INTRAVENOUS
Status: DISCONTINUED | OUTPATIENT
Start: 2022-07-08 | End: 2022-07-13 | Stop reason: HOSPADM

## 2022-07-08 RX ORDER — HYDROMORPHONE HYDROCHLORIDE 1 MG/ML
0.5 INJECTION, SOLUTION INTRAMUSCULAR; INTRAVENOUS; SUBCUTANEOUS
Status: COMPLETED | OUTPATIENT
Start: 2022-07-08 | End: 2022-07-08

## 2022-07-08 RX ORDER — WARFARIN 2.5 MG/1
7.5 TABLET ORAL DAILY
Status: DISCONTINUED | OUTPATIENT
Start: 2022-07-08 | End: 2022-07-08

## 2022-07-08 RX ORDER — LISINOPRIL 20 MG/1
40 TABLET ORAL DAILY
Status: DISCONTINUED | OUTPATIENT
Start: 2022-07-08 | End: 2022-07-13 | Stop reason: HOSPADM

## 2022-07-08 RX ORDER — INSULIN ASPART 100 [IU]/ML
1-6 INJECTION, SOLUTION INTRAVENOUS; SUBCUTANEOUS
Status: DISCONTINUED | OUTPATIENT
Start: 2022-07-08 | End: 2022-07-13 | Stop reason: HOSPADM

## 2022-07-08 RX ORDER — POTASSIUM CHLORIDE 20 MEQ/1
20 TABLET, EXTENDED RELEASE ORAL
Status: DISCONTINUED | OUTPATIENT
Start: 2022-07-08 | End: 2022-07-13 | Stop reason: HOSPADM

## 2022-07-08 RX ORDER — MORPHINE SULFATE 4 MG/ML
4 INJECTION, SOLUTION INTRAMUSCULAR; INTRAVENOUS EVERY 4 HOURS PRN
Status: DISCONTINUED | OUTPATIENT
Start: 2022-07-08 | End: 2022-07-08

## 2022-07-08 RX ORDER — HYDROXYCHLOROQUINE SULFATE 200 MG/1
200 TABLET, FILM COATED ORAL DAILY
Status: DISCONTINUED | OUTPATIENT
Start: 2022-07-08 | End: 2022-07-13 | Stop reason: HOSPADM

## 2022-07-08 RX ORDER — FLUTICASONE FUROATE AND VILANTEROL 100; 25 UG/1; UG/1
1 POWDER RESPIRATORY (INHALATION) DAILY
Status: DISCONTINUED | OUTPATIENT
Start: 2022-07-08 | End: 2022-07-09

## 2022-07-08 RX ORDER — BUMETANIDE 1 MG/1
1 TABLET ORAL DAILY
Status: DISCONTINUED | OUTPATIENT
Start: 2022-07-08 | End: 2022-07-13 | Stop reason: HOSPADM

## 2022-07-08 RX ORDER — HYDROMORPHONE HYDROCHLORIDE 1 MG/ML
0.5 INJECTION, SOLUTION INTRAMUSCULAR; INTRAVENOUS; SUBCUTANEOUS EVERY 6 HOURS PRN
Status: DISCONTINUED | OUTPATIENT
Start: 2022-07-08 | End: 2022-07-13 | Stop reason: HOSPADM

## 2022-07-08 RX ORDER — MORPHINE SULFATE 2 MG/ML
2 INJECTION, SOLUTION INTRAMUSCULAR; INTRAVENOUS EVERY 4 HOURS PRN
Status: DISCONTINUED | OUTPATIENT
Start: 2022-07-08 | End: 2022-07-08

## 2022-07-08 RX ORDER — MAGNESIUM SULFATE HEPTAHYDRATE 40 MG/ML
4 INJECTION, SOLUTION INTRAVENOUS
Status: DISCONTINUED | OUTPATIENT
Start: 2022-07-08 | End: 2022-07-13 | Stop reason: HOSPADM

## 2022-07-08 RX ADMIN — HYDROMORPHONE HYDROCHLORIDE 0.5 MG: 1 INJECTION, SOLUTION INTRAMUSCULAR; INTRAVENOUS; SUBCUTANEOUS at 01:07

## 2022-07-08 RX ADMIN — IOHEXOL 100 ML: 350 INJECTION, SOLUTION INTRAVENOUS at 12:07

## 2022-07-08 RX ADMIN — ALLOPURINOL 100 MG: 100 TABLET ORAL at 09:07

## 2022-07-08 RX ADMIN — Medication 5000 UNITS: at 09:07

## 2022-07-08 RX ADMIN — TAMSULOSIN HYDROCHLORIDE 0.4 MG: 0.4 CAPSULE ORAL at 09:07

## 2022-07-08 RX ADMIN — METOPROLOL SUCCINATE 25 MG: 25 TABLET, EXTENDED RELEASE ORAL at 10:07

## 2022-07-08 RX ADMIN — METOPROLOL SUCCINATE 25 MG: 25 TABLET, FILM COATED, EXTENDED RELEASE ORAL at 09:07

## 2022-07-08 RX ADMIN — BUMETANIDE 1 MG: 1 TABLET ORAL at 09:07

## 2022-07-08 RX ADMIN — HYDROXYCHLOROQUINE SULFATE 200 MG: 200 TABLET, FILM COATED ORAL at 09:07

## 2022-07-08 RX ADMIN — LISINOPRIL 40 MG: 20 TABLET ORAL at 01:07

## 2022-07-08 NOTE — CONSULTS
[facility] - Emergency Dept  General Surgery  Consult Note    Patient Name: Kennedy Thomas  MRN: [id number]  Code Status: Full Code  Admission Date: 7/7/2022  Hospital Length of Stay: 0 days  Attending Physician: Jaja Berg MD  Primary Care Provider: Fernando Eagle MD    Patient information was obtained from patient and ER records.     Inpatient consult to General Surgery  Consult performed by: Santa Cardona MD  Consult ordered by: Jaja Berg MD  Reason for consult: choledocholithiasis  Assessment/Recommendations: Lap latonia when appropriate        Subjective:     Principal Problem: Calculus of bile duct without cholecystitis with obstruction    History of Present Illness: 81-year-old male presents with generalized abdominal pain.  He reports the pain was slightly worse on the right but also on the left and somewhat general throughout his abdomen.  He did report associated nausea.  He does not describe the pain in any way but reports it is uncomfortable.  He did not vomit.  He presented to the ER after having a few days this pain.  He reports the pain has happened in the past 1 time specifically he recalls was associated with gallstones.  He does report the pain was slightly worse on the right side at that episode but not this episode.  He had talked to a surgeon about having his gallbladder removed in the past.  He takes Coumadin for AFib and is currently on this at this time.      No current facility-administered medications on file prior to encounter.     Current Outpatient Medications on File Prior to Encounter   Medication Sig    allopurinoL (ZYLOPRIM) 100 MG tablet TAKE 1 TABLET(100 MG) BY MOUTH EVERY DAY (Patient taking differently: Take 100 mg by mouth nightly.)    bumetanide (BUMEX) 1 MG tablet Take 1 mg by mouth every morning. Early am, at 0500    hydrOXYchloroQUINE (PLAQUENIL) 200 mg tablet TAKE 1 TABLET(200 MG) BY MOUTH EVERY DAY (Patient taking differently: Take  200 mg by mouth once daily.)    metoprolol succinate (TOPROL-XL) 50 MG 24 hr tablet Take 50 mg by mouth once daily.    potassium chloride (KLOR-CON) 10 MEQ TbSR Take 10 mEq by mouth once daily.    terazosin (HYTRIN) 5 MG capsule Take 5 mg by mouth every evening.    vitamin D (VITAMIN D3) 1000 units Tab Take 5,000 Units by mouth.    warfarin (COUMADIN) 5 MG tablet Take 10 mg by mouth every Monday and Thursday.    warfarin (COUMADIN) 5 MG tablet Take 7.5 mg by mouth every Tue, Wed, Fri, Sat, Sun.    benazepril (LOTENSIN) 40 MG tablet Take 40 mg by mouth with lunch. At noon    ondansetron (ZOFRAN-ODT) 4 MG TbDL Take 1 tablet (4 mg total) by mouth every 6 (six) hours as needed. (Patient not taking: Reported on 5/24/2022)    SYMBICORT 160-4.5 mcg/actuation HFAA Inhale 1 puff into the lungs as needed.    [DISCONTINUED] donepeziL (ARICEPT) 5 MG tablet Take 5 mg by mouth once daily.       Review of patient's allergies indicates:   Allergen Reactions    Statins-hmg-coa reductase inhibitors        Past Medical History:   Diagnosis Date    Arthritis     Atrial fibrillation     CKD (chronic kidney disease)     COPD (chronic obstructive pulmonary disease)     Diabetes mellitus, type 2     Gout     Heart block     Hypertension     Pancreatitis     Peripheral vascular disease      Past Surgical History:   Procedure Laterality Date    APPENDECTOMY      CARDIAC PACEMAKER PLACEMENT  2010    EYE SURGERY      INGUINAL HERNIA REPAIR Left      Family History       Problem Relation (Age of Onset)    Diabetes Mellitus Mother          Tobacco Use    Smoking status: Former Smoker    Smokeless tobacco: Never Used   Substance and Sexual Activity    Alcohol use: No    Drug use: No    Sexual activity: Not on file     Review of Systems   Constitutional: Negative.    HENT: Negative.     Eyes: Negative.    Respiratory: Negative.     Cardiovascular: Negative.    Gastrointestinal:  Positive for abdominal distention and  abdominal pain.   Endocrine: Negative.    Genitourinary: Negative.    Musculoskeletal: Negative.    Skin: Negative.    Allergic/Immunologic: Negative.    Neurological: Negative.    Hematological: Negative.    All other systems reviewed and are negative.  Objective:     Vital Signs (Most Recent):  Temp: 98.5 °F (36.9 °C) (07/08/22 1300)  Pulse: 61 (07/08/22 1300)  Resp: (!) 32 (07/08/22 1300)  BP: (!) 147/68 (07/08/22 1300)  SpO2: 95 % (07/08/22 1300)   Vital Signs (24h Range):  Temp:  [97.8 °F (36.6 °C)-98.5 °F (36.9 °C)] 98.5 °F (36.9 °C)  Pulse:  [60-64] 61  Resp:  [15-32] 32  SpO2:  [92 %-96 %] 95 %  BP: (124-160)/(67-86) 147/68     Weight: 104.5 kg (230 lb 6.1 oz)  Body mass index is 31.25 kg/m².    Physical Exam  Vitals and nursing note reviewed.   Constitutional:       Appearance: He is well-developed.   HENT:      Head: Normocephalic and atraumatic.      Right Ear: External ear normal.      Left Ear: External ear normal.      Nose: Nose normal.   Eyes:      General: Scleral icterus present.      Conjunctiva/sclera: Conjunctivae normal.      Pupils: Pupils are equal, round, and reactive to light.   Cardiovascular:      Rate and Rhythm: Normal rate. Rhythm irregular.      Heart sounds: Normal heart sounds.   Pulmonary:      Effort: Pulmonary effort is normal.      Breath sounds: Normal breath sounds.   Abdominal:      General: Bowel sounds are normal. There is no distension.      Palpations: Abdomen is soft.      Tenderness: There is no abdominal tenderness.   Musculoskeletal:         General: Normal range of motion.      Cervical back: Normal range of motion and neck supple.   Skin:     General: Skin is warm and dry.      Capillary Refill: Capillary refill takes less than 2 seconds.   Neurological:      Mental Status: He is alert and oriented to person, place, and time.   Psychiatric:         Behavior: Behavior normal.         Thought Content: Thought content normal.         Judgment: Judgment normal.        Significant Labs:  I have reviewed all pertinent lab results within the past 24 hours.  CBC:   Recent Labs   Lab 07/08/22 0407   WBC 4.65   RBC 3.75*   HGB 11.8*   HCT 35.0*   PLT 95*   MCV 93   MCH 31.5*   MCHC 33.7     BMP:   Recent Labs   Lab 07/08/22 0407         K 3.8      CO2 30*   BUN 18   CREATININE 0.9   CALCIUM 8.8       Significant Diagnostics:  I have reviewed all pertinent imaging results/findings within the past 24 hours.  CT images independently reviewed.  Obvious gallstones.  Diverticulosis.  Other findings as per report    Us report reviewed      Assessment/Plan:     * Calculus of bile duct without cholecystitis with obstruction  Choledocholithiasis  On coumadin will need cholecystectomy +- ERCP depending on clinical course  Would hold blood thinners for now and prepare for surgery    I have spoken to Dr. Berg about the case.      VTE Risk Mitigation (From admission, onward)         Ordered     warfarin (COUMADIN) tablet 7.5 mg  Daily         07/08/22 0346     IP VTE HIGH RISK PATIENT  Once         07/08/22 0346     Place sequential compression device  Until discontinued         07/08/22 0346     Reason for No Pharmacological VTE Prophylaxis  Once        Question:  Reasons:  Answer:  Already adequately anticoagulated on oral Anticoagulants    07/08/22 0346                Thank you for your consult. I will follow-up with patient. Please contact us if you have any additional questions.    Santa Cardona MD  General Surgery  [facility] - Emergency Dept

## 2022-07-08 NOTE — CONSULTS
GASTROENTEROLOGY INPATIENT CONSULT NOTE  Patient Name: Kennedy Thomas  Patient MRN: [id number]  Patient : 1940    Admit Date: 2022  Service date: 2022    Reason for Consult: abd pain    PCP: Fernando Eagle MD    Chief Complaint   Patient presents with    Flank Pain     Right lower back pain and right flank pain starting today, denies n/v/d       HPI: Patient is a 81 y.o. male with history of AFib on Coumadin presented with acute onset moderate to severe right upper quadrant and right lower quadrant pain associated with nausea without vomiting.  Patient underwent ultrasound and CT imaging showing stones with normal CBD diameter.  LFTs were noted to be moderately elevated with rising bilirubin 1 with AST ALT and normal alkaline phosphatase.  Today patient noted that his pain completely resolved.  Reports similar events over the past month.     Past Medical History:  Past Medical History:   Diagnosis Date    Arthritis     Atrial fibrillation     CKD (chronic kidney disease)     COPD (chronic obstructive pulmonary disease)     Diabetes mellitus, type 2     Gout     Heart block     Hypertension     Pancreatitis     Peripheral vascular disease         Past Surgical History:  Past Surgical History:   Procedure Laterality Date    APPENDECTOMY      CARDIAC PACEMAKER PLACEMENT      EYE SURGERY      INGUINAL HERNIA REPAIR Left         Home Medications:  (Not in a hospital admission)      Inpatient Medications:   allopurinoL  100 mg Oral Daily    bumetanide  1 mg Oral Daily    fluticasone furoate-vilanteroL  1 puff Inhalation Daily    hydrOXYchloroQUINE  200 mg Oral Daily    lisinopriL  40 mg Oral Daily    metoprolol succinate  50 mg Oral Daily    tamsulosin  0.4 mg Oral Daily    vitamin D  5,000 Units Oral Daily    warfarin  7.5 mg Oral Daily     acetaminophen, calcium chloride IVPB, calcium chloride IVPB, calcium chloride IVPB, dextrose 10%, dextrose 10%, insulin aspart U-100,  magnesium oxide, magnesium sulfate IVPB, magnesium sulfate IVPB, magnesium sulfate IVPB, magnesium sulfate IVPB, melatonin, morphine, morphine, ondansetron, potassium chloride, potassium chloride, potassium chloride, potassium chloride, sodium phosphate IVPB, sodium phosphate IVPB, sodium phosphate IVPB, sodium phosphate IVPB, sodium phosphate IVPB    Review of patient's allergies indicates:   Allergen Reactions    Statins-hmg-coa reductase inhibitors        Social History:   Social History     Occupational History    Not on file   Tobacco Use    Smoking status: Former Smoker    Smokeless tobacco: Never Used   Substance and Sexual Activity    Alcohol use: No    Drug use: No    Sexual activity: Not on file       Family History:   Family History   Problem Relation Age of Onset    Diabetes Mellitus Mother        Review of Systems:  A 10 point review of systems was performed and was normal, except as mentioned in the HPI, including constitutional, HEENT, heme, lymph, cardiovascular, respiratory, gastrointestinal, genitourinary, neurologic, endocrine, psychiatric and musculoskeletal.      OBJECTIVE:    Physical Exam:  24 Hour Vital Sign Ranges: Temp:  [97.8 °F (36.6 °C)] 97.8 °F (36.6 °C)  Pulse:  [60-64] 62  Resp:  [15-26] 22  SpO2:  [92 %-96 %] 94 %  BP: (124-149)/(67-86) 149/71  Most recent vitals: BP (!) 149/71   Pulse 62   Temp 97.8 °F (36.6 °C) (Oral)   Resp (!) 22   Ht 6' (1.829 m)   Wt 104.5 kg (230 lb 6.1 oz)   SpO2 (!) 94%   BMI 31.25 kg/m²    GEN: well-developed, well-nourished, awake and alert, non-toxic appearing adult  HEENT: PERRL, sclera anicteric, oral mucosa pink and moist without lesion  NECK: trachea midline; Good ROM  CV: regular rate and rhythm, no murmurs or gallops  RESP: clear to auscultation bilaterally, no wheezes, rhonci or rales  ABD: soft, non-tender, non-distended, normal bowel sounds  EXT: no swelling or edema, 2+ pulses distally  SKIN: no rashes or jaundice  PSYCH: normal  affect    Labs:   Recent Labs     07/07/22 2119 07/08/22  0407   WBC 4.81 4.65   MCV 92 93   * 95*     Recent Labs     07/07/22 2119 07/08/22 0407    137   K 3.7 3.8    103   CO2 27 30*   BUN 18 18   * 104     No results for input(s): ALB in the last 72 hours.    Invalid input(s): ALKP, SGOT, SGPT, TBIL, DBIL, TPRO  Recent Labs     07/08/22 0407   INR 2.5         Radiology Review:  CT Abdomen Pelvis With Contrast   Final Result      US Abdomen Limited   Final Result            IMPRESSION / RECOMMENDATIONS:  Biliary colic  Elevated LFTS  Thrombocytopenia    -cannot get mrcp  -trend LFTS   -considering EUS  -consult gen surg  -recommend hep panel    Thank you for this consult.    Bobby Jennings  7/8/2022  11:18 AM

## 2022-07-08 NOTE — PLAN OF CARE
[facility] - Emergency Dept  Initial Discharge Assessment       Primary Care Provider: Fernando Eagle MD    Admission Diagnosis: Calculus of bile duct with obstruction and without cholangitis or cholecystitis [K80.51]    Admission Date: 7/7/2022  Expected Discharge Date:     Discharge Barriers Identified: None    Payor: [employer] / Plan: [employer] CHOICES 65 / Product Type: Medicare Advantage /     Extended Emergency Contact Information  Primary Emergency Contact: Gayla Thomas  Address: [address] [place] [address] [id number] [place] [address]  Home Phone: [phone]  Mobile Phone: [phone]  Relation: Spouse    Discharge Plan A: Home with family  Discharge Plan B: Home with family      [id number] - [place], [place] - [address] AT [facility] [address] [facility]  [address] [facility] [address]  Phone: [phone] Fax: [phone]      Initial Assessment (most recent)     Adult Discharge Assessment - 07/08/22 0936        Discharge Assessment    Assessment Type Discharge Planning Assessment     Confirmed/corrected address, phone number and insurance Yes     Confirmed Demographics Correct on Facesheet     Source of Information patient   Assessment completed at bedside    When was your last doctors appointment? 06/24/22     Does patient/caregiver understand observation status Yes     Lives With spouse     Facility Arrived From: Home     Do you expect to return to your current living situation? Yes     Do you have help at home or someone to help you manage your care at home? Yes     Who are your caregiver(s) and their phone number(s)? Gayla Thomas (Spouse)   [phone] (Home Phone)     Prior to hospitilization cognitive status: Alert/Oriented     Current cognitive status: Alert/Oriented     Walking or Climbing Stairs Difficulty none     Dressing/Bathing Difficulty none     Equipment Currently Used at Home oxygen   Nocturnal oxygen     Readmission within 30 days? No     Patient currently being followed by outpatient case management? No     Do you currently have service(s) that help you manage your care at home? No     Do you take prescription medications? Yes     Do you have prescription coverage? Yes     Coverage PHN     Do you have any problems affording any of your prescribed medications? No     Who is going to help you get home at discharge? Gayla Thomas (Spouse)   [phone] (Home Phone)     How do you get to doctors appointments? car, drives self     Are you on dialysis? No     Do you take coumadin? Yes     Who monitors your labs? Dr. Zapata     Discharge Plan A Home with family     Discharge Plan B Home with family     DME Needed Upon Discharge  none     Discharge Plan discussed with: Patient     Discharge Barriers Identified None        Relationship/Environment    Name(s) of Who Lives With Patient Gayla Thomas (Spouse)   [phone] (Home Phone)

## 2022-07-08 NOTE — ASSESSMENT & PLAN NOTE
admit, obs currently, cannot get MRCP because of above PPM; GI consultation for ERCP; clears currently; analgesia prn; INR with AM labs for review

## 2022-07-08 NOTE — HPI
81-year-old male presents with generalized abdominal pain.  He reports the pain was slightly worse on the right but also on the left and somewhat general throughout his abdomen.  He did report associated nausea.  He does not describe the pain in any way but reports it is uncomfortable.  He did not vomit.  He presented to the ER after having a few days this pain.  He reports the pain has happened in the past 1 time specifically he recalls was associated with gallstones.  He does report the pain was slightly worse on the right side at that episode but not this episode.  He had talked to a surgeon about having his gallbladder removed in the past.  He takes Coumadin for AFib and is currently on this at this time.

## 2022-07-08 NOTE — H&P
"[facility] - Emergency Dept  Hospital Medicine  History & Physical    Patient Name: Kennedy Thomas  MRN: [id number]  Patient Class: OP- Observation  Admission Date: 7/7/2022  Attending Physician: Carlos Kimball MD  Primary Care Provider: Fernando Eagle MD         Patient information was obtained from patient, spouse/SO, past medical records, ER records and ED MD.     Subjective:     Principal Problem:Calculus of bile duct without cholecystitis with obstruction    Chief Complaint:   Chief Complaint   Patient presents with    Flank Pain     Right lower back pain and right flank pain starting today, denies n/v/d        HPI: 81 year old male with history DM 2 (diet controlled), HTN, PAF, COPD, Cholelithiasis and has not been vaccinated against COVID presented to ED complaining of month history of right sided upper abdominal/flank pain 8/10 sharp stabbing waxing and waning own accord with radiations around to his back. He reported that he saw a General Surgeon (Zack), who recommended removing his GB. However the patient stated he "Was not ready yet" for the procedure. No N/V. No change in BM. He stated he has "An old pacemaker" for heart block, which is not MRI compatible. He is on warfarin for PAF.    In ED: Labs reviewed and noted below: no leukocytosis with mild normocytic anemia; normal electrolytes and renal function; elevated bilirubin, transaminases and lipase; troponin is normal. U/A:  2+ bilirubin. CT Abdomen reviewed: cholelithiasis. No evidence of cholecystitis on U/S. % paced. Discussed with ED MD: admit, obs currently, cannot get MRCP because of above PPM; GI consultation for ERCP; clears currently; analgesia prn      Past Medical History:   Diagnosis Date    Arthritis     Atrial fibrillation     CKD (chronic kidney disease)     COPD (chronic obstructive pulmonary disease)     Diabetes mellitus, type 2     Gout     Heart block     Hypertension     Pancreatitis     " Peripheral vascular disease        Past Surgical History:   Procedure Laterality Date    APPENDECTOMY      CARDIAC PACEMAKER PLACEMENT  2010    EYE SURGERY      INGUINAL HERNIA REPAIR Left        Review of patient's allergies indicates:   Allergen Reactions    Statins-hmg-coa reductase inhibitors        No current facility-administered medications on file prior to encounter.     Current Outpatient Medications on File Prior to Encounter   Medication Sig    allopurinoL (ZYLOPRIM) 100 MG tablet TAKE 1 TABLET(100 MG) BY MOUTH EVERY DAY (Patient taking differently: Take 100 mg by mouth once daily.)    benazepril (LOTENSIN) 40 MG tablet Take 40 mg by mouth once daily.    bumetanide (BUMEX) 1 MG tablet Take 1 mg by mouth once daily.    hydrOXYchloroQUINE (PLAQUENIL) 200 mg tablet TAKE 1 TABLET(200 MG) BY MOUTH EVERY DAY    metoprolol succinate (TOPROL-XL) 50 MG 24 hr tablet Take 50 mg by mouth once daily.    ondansetron (ZOFRAN-ODT) 4 MG TbDL Take 1 tablet (4 mg total) by mouth every 6 (six) hours as needed. (Patient not taking: Reported on 5/24/2022)    potassium chloride (KLOR-CON) 10 MEQ TbSR Take 10 mEq by mouth once daily.    SYMBICORT 160-4.5 mcg/actuation HFAA Inhale 1 puff into the lungs as needed.    terazosin (HYTRIN) 5 MG capsule Take 5 mg by mouth every evening.    vitamin D (VITAMIN D3) 1000 units Tab Take 5,000 Units by mouth.    warfarin (COUMADIN) 5 MG tablet Take 7.5 mg by mouth Daily.    [DISCONTINUED] donepeziL (ARICEPT) 5 MG tablet Take 5 mg by mouth once daily.     Family History       Problem Relation (Age of Onset)    Diabetes Mellitus Mother          Tobacco Use    Smoking status: Former Smoker    Smokeless tobacco: Never Used   Substance and Sexual Activity    Alcohol use: No    Drug use: No    Sexual activity: Not on file     Review of Systems   Constitutional: Negative.    HENT: Negative.     Eyes: Negative.    Respiratory: Negative.     Cardiovascular: Negative.     Gastrointestinal:  Positive for abdominal distention and abdominal pain.   Endocrine: Negative.    Genitourinary: Negative.    Musculoskeletal: Negative.    Skin: Negative.    Allergic/Immunologic: Negative.    Neurological: Negative.    Hematological: Negative.    All other systems reviewed and are negative.  Objective:     Vital Signs (Most Recent):  Temp: 97.8 °F (36.6 °C) (07/07/22 2052)  Pulse: 60 (07/08/22 0102)  Resp: 18 (07/08/22 0102)  BP: 124/72 (07/08/22 0102)  SpO2: 96 % (07/08/22 0102) Vital Signs (24h Range):  Temp:  [97.8 °F (36.6 °C)] 97.8 °F (36.6 °C)  Pulse:  [60-64] 60  Resp:  [18] 18  SpO2:  [95 %-96 %] 96 %  BP: (124-144)/(72-86) 124/72     Weight: 104.5 kg (230 lb 6.1 oz)  Body mass index is 31.25 kg/m².    Physical Exam  Vitals and nursing note reviewed.   Constitutional:       Appearance: He is well-developed.   HENT:      Head: Normocephalic and atraumatic.      Right Ear: External ear normal.      Left Ear: External ear normal.      Nose: Nose normal.   Eyes:      Conjunctiva/sclera: Conjunctivae normal.      Pupils: Pupils are equal, round, and reactive to light.   Cardiovascular:      Rate and Rhythm: Normal rate. Rhythm irregular.      Heart sounds: Normal heart sounds.   Pulmonary:      Effort: Pulmonary effort is normal.      Breath sounds: Normal breath sounds.   Abdominal:      General: Bowel sounds are normal.      Palpations: Abdomen is soft.      Comments: Soft with voluntary guarding, no peritoneal; BS+   Musculoskeletal:         General: Normal range of motion.      Cervical back: Normal range of motion and neck supple.   Skin:     General: Skin is warm and dry.      Capillary Refill: Capillary refill takes less than 2 seconds.   Neurological:      Mental Status: He is alert and oriented to person, place, and time.   Psychiatric:         Behavior: Behavior normal.         Thought Content: Thought content normal.         Judgment: Judgment normal.         CRANIAL NERVES     CN  III, IV, VI   Pupils are equal, round, and reactive to light.     Significant Labs: All pertinent labs within the past 24 hours have been reviewed.  CBC:   Recent Labs   Lab 07/07/22 2119   WBC 4.81   HGB 12.0*   HCT 35.3*   *     CMP:   Recent Labs   Lab 07/07/22 2119      K 3.7      CO2 27   *   BUN 18   CREATININE 0.9   CALCIUM 9.2   PROT 7.3   ALBUMIN 4.4   BILITOT 4.5*   ALKPHOS 98   *   ALT 98*   ANIONGAP 9   EGFRNONAA >60.0     Cardiac Markers: No results for input(s): CKMB, MYOGLOBIN, BNP, TROPISTAT in the last 48 hours.  Troponin:   Recent Labs   Lab 07/07/22 2119   TROPONINI <0.030       Significant Imaging: I have reviewed all pertinent imaging results/findings within the past 24 hours.    Assessment/Plan:     * Calculus of bile duct without cholecystitis with obstruction  admit, obs currently, cannot get MRCP because of above PPM; GI consultation for ERCP; clears currently; analgesia prn; INR with AM labs for review        VTE Risk Mitigation (From admission, onward)         Ordered     warfarin (COUMADIN) tablet 7.5 mg  Daily         07/08/22 0346     IP VTE HIGH RISK PATIENT  Once         07/08/22 0346     Place sequential compression device  Until discontinued         07/08/22 0346     Reason for No Pharmacological VTE Prophylaxis  Once        Question:  Reasons:  Answer:  Already adequately anticoagulated on oral Anticoagulants    07/08/22 0346                   Carlos Kimball MD  Department of Hospital Medicine   [facility] - Emergency Dept

## 2022-07-08 NOTE — SUBJECTIVE & OBJECTIVE
No current facility-administered medications on file prior to encounter.     Current Outpatient Medications on File Prior to Encounter   Medication Sig    allopurinoL (ZYLOPRIM) 100 MG tablet TAKE 1 TABLET(100 MG) BY MOUTH EVERY DAY (Patient taking differently: Take 100 mg by mouth nightly.)    bumetanide (BUMEX) 1 MG tablet Take 1 mg by mouth every morning. Early am, at 0500    hydrOXYchloroQUINE (PLAQUENIL) 200 mg tablet TAKE 1 TABLET(200 MG) BY MOUTH EVERY DAY (Patient taking differently: Take 200 mg by mouth once daily.)    metoprolol succinate (TOPROL-XL) 50 MG 24 hr tablet Take 50 mg by mouth once daily.    potassium chloride (KLOR-CON) 10 MEQ TbSR Take 10 mEq by mouth once daily.    terazosin (HYTRIN) 5 MG capsule Take 5 mg by mouth every evening.    vitamin D (VITAMIN D3) 1000 units Tab Take 5,000 Units by mouth.    warfarin (COUMADIN) 5 MG tablet Take 10 mg by mouth every Monday and Thursday.    warfarin (COUMADIN) 5 MG tablet Take 7.5 mg by mouth every Tue, Wed, Fri, Sat, Sun.    benazepril (LOTENSIN) 40 MG tablet Take 40 mg by mouth with lunch. At noon    ondansetron (ZOFRAN-ODT) 4 MG TbDL Take 1 tablet (4 mg total) by mouth every 6 (six) hours as needed. (Patient not taking: Reported on 5/24/2022)    SYMBICORT 160-4.5 mcg/actuation HFAA Inhale 1 puff into the lungs as needed.    [DISCONTINUED] donepeziL (ARICEPT) 5 MG tablet Take 5 mg by mouth once daily.       Review of patient's allergies indicates:   Allergen Reactions    Statins-hmg-coa reductase inhibitors        Past Medical History:   Diagnosis Date    Arthritis     Atrial fibrillation     CKD (chronic kidney disease)     COPD (chronic obstructive pulmonary disease)     Diabetes mellitus, type 2     Gout     Heart block     Hypertension     Pancreatitis     Peripheral vascular disease      Past Surgical History:   Procedure Laterality Date    APPENDECTOMY      CARDIAC PACEMAKER PLACEMENT  2010    EYE SURGERY      INGUINAL HERNIA REPAIR Left       Family History       Problem Relation (Age of Onset)    Diabetes Mellitus Mother          Tobacco Use    Smoking status: Former Smoker    Smokeless tobacco: Never Used   Substance and Sexual Activity    Alcohol use: No    Drug use: No    Sexual activity: Not on file     Review of Systems   Constitutional: Negative.    HENT: Negative.     Eyes: Negative.    Respiratory: Negative.     Cardiovascular: Negative.    Gastrointestinal:  Positive for abdominal distention and abdominal pain.   Endocrine: Negative.    Genitourinary: Negative.    Musculoskeletal: Negative.    Skin: Negative.    Allergic/Immunologic: Negative.    Neurological: Negative.    Hematological: Negative.    All other systems reviewed and are negative.  Objective:     Vital Signs (Most Recent):  Temp: 98.5 °F (36.9 °C) (07/08/22 1300)  Pulse: 61 (07/08/22 1300)  Resp: (!) 32 (07/08/22 1300)  BP: (!) 147/68 (07/08/22 1300)  SpO2: 95 % (07/08/22 1300)   Vital Signs (24h Range):  Temp:  [97.8 °F (36.6 °C)-98.5 °F (36.9 °C)] 98.5 °F (36.9 °C)  Pulse:  [60-64] 61  Resp:  [15-32] 32  SpO2:  [92 %-96 %] 95 %  BP: (124-160)/(67-86) 147/68     Weight: 104.5 kg (230 lb 6.1 oz)  Body mass index is 31.25 kg/m².    Physical Exam  Vitals and nursing note reviewed.   Constitutional:       Appearance: He is well-developed.   HENT:      Head: Normocephalic and atraumatic.      Right Ear: External ear normal.      Left Ear: External ear normal.      Nose: Nose normal.   Eyes:      General: Scleral icterus present.      Conjunctiva/sclera: Conjunctivae normal.      Pupils: Pupils are equal, round, and reactive to light.   Cardiovascular:      Rate and Rhythm: Normal rate. Rhythm irregular.      Heart sounds: Normal heart sounds.   Pulmonary:      Effort: Pulmonary effort is normal.      Breath sounds: Normal breath sounds.   Abdominal:      General: Bowel sounds are normal. There is no distension.      Palpations: Abdomen is soft.      Tenderness: There is no  abdominal tenderness.   Musculoskeletal:         General: Normal range of motion.      Cervical back: Normal range of motion and neck supple.   Skin:     General: Skin is warm and dry.      Capillary Refill: Capillary refill takes less than 2 seconds.   Neurological:      Mental Status: He is alert and oriented to person, place, and time.   Psychiatric:         Behavior: Behavior normal.         Thought Content: Thought content normal.         Judgment: Judgment normal.       Significant Labs:  I have reviewed all pertinent lab results within the past 24 hours.  CBC:   Recent Labs   Lab 07/08/22  0407   WBC 4.65   RBC 3.75*   HGB 11.8*   HCT 35.0*   PLT 95*   MCV 93   MCH 31.5*   MCHC 33.7     BMP:   Recent Labs   Lab 07/08/22 0407         K 3.8      CO2 30*   BUN 18   CREATININE 0.9   CALCIUM 8.8       Significant Diagnostics:  I have reviewed all pertinent imaging results/findings within the past 24 hours.  CT images independently reviewed.  Obvious gallstones.  Diverticulosis.  Other findings as per report    Us report reviewed

## 2022-07-08 NOTE — ASSESSMENT & PLAN NOTE
Choledocholithiasis  On coumadin will need cholecystectomy +- ERCP depending on clinical course  Would hold blood thinners for now and prepare for surgery    I have spoken to Dr. Berg about the case.

## 2022-07-08 NOTE — FIRST PROVIDER EVALUATION
Emergency Department TeleTriage Encounter Note      CHIEF COMPLAINT    Chief Complaint   Patient presents with    Flank Pain     Right lower back pain and right flank pain starting today, denies n/v/d       VITAL SIGNS   Initial Vitals [07/07/22 2052]   BP Pulse Resp Temp SpO2   (!) 144/86 64 18 97.8 °F (36.6 °C) 95 %      MAP       --            ALLERGIES    Review of patient's allergies indicates:   Allergen Reactions    Statins-hmg-coa reductase inhibitors        PROVIDER TRIAGE NOTE  81-year-old male to the emergency department for evaluation of lower back discomfort which radiates around the flank into the abdomen.  Pain began today.  Denies nausea vomiting or diarrhea.  Vital signs normal.  Afebrile.      ORDERS  Labs Reviewed   CBC W/ AUTO DIFFERENTIAL   COMPREHENSIVE METABOLIC PANEL   LIPASE   TROPONIN I   URINALYSIS, REFLEX TO URINE CULTURE       ED Orders (720h ago, onward)    Start Ordered     Status Ordering Provider    07/07/22 2110 07/07/22 2109  Saline lock IV  Once         Ordered JANET MORAN    07/07/22 2110 07/07/22 2109  CBC Auto Differential  STAT         Ordered JANET MORAN    07/07/22 2110 07/07/22 2109  Comprehensive Metabolic Panel  STAT         Ordered JANET MORAN    07/07/22 2110 07/07/22 2109  Lipase  STAT         Ordered JANET MORAN    07/07/22 2110 07/07/22 2109  Troponin I  STAT         Ordered JANET MORAN    07/07/22 2110 07/07/22 2109  Urinalysis, Reflex to Urine Culture  STAT         Ordered JANET MORAN            Virtual Visit Note: The provider triage portion of this emergency department evaluation and documentation was performed via [employer], a HIPAA-compliant telemedicine application, in concert with a tele-presenter in the room. A face to face patient evaluation with one of my colleagues will occur once the patient is placed in an emergency department room.      DISCLAIMER: This note was prepared with M*Modal voice recognition  transcription software. Garbled syntax, mangled pronouns, and other bizarre constructions may be attributed to that software system.

## 2022-07-08 NOTE — PLAN OF CARE
07/08/22 0941   LORA Message   Medicare Outpatient and Observation Notification regarding financial responsibility Given to patient/caregiver;Explained to patient/caregiver;Signed/date by patient/caregiver   Date LORA was signed 07/08/22   Time LORA was signed 0940

## 2022-07-08 NOTE — ED PROVIDER NOTES
Encounter Date: 7/7/2022       History     Chief Complaint   Patient presents with    Flank Pain     Right lower back pain and right flank pain starting today, denies n/v/d     HPI     Seen and evaluated.  Presents with a chief complaint of back and flank pain with a feeling of distention across his abdomen.  This is diffuse and generalized across the abdomen.  He states when this flares up his abdomen feels tight.  This is an acute episodic process.  He has had similar processes over the last month or so.  Previously it evaluation has been stable.  Here tonight he notes worsening pain.  This is an acute worsening of a subacute ongoing condition.  He has no associated fever chills.  The symptoms are moderate to severe persistent.  Sometimes belching her passing gas improves the symptoms.  There is no other clear alleviating or exacerbating factors.  Has no associated nausea vomiting or diarrhea.    Review of patient's allergies indicates:   Allergen Reactions    Statins-hmg-coa reductase inhibitors      Past Medical History:   Diagnosis Date    Arthritis     Atrial fibrillation     CKD (chronic kidney disease)     COPD (chronic obstructive pulmonary disease)     Diabetes mellitus, type 2     Gout     Heart block     Hypertension     Pancreatitis     Peripheral vascular disease      Past Surgical History:   Procedure Laterality Date    APPENDECTOMY      CARDIAC PACEMAKER PLACEMENT  2010    EYE SURGERY      INGUINAL HERNIA REPAIR Left      Family History   Problem Relation Age of Onset    Diabetes Mellitus Mother      Social History     Tobacco Use    Smoking status: Former Smoker    Smokeless tobacco: Never Used   Substance Use Topics    Alcohol use: No    Drug use: No     Review of Systems   Constitutional: Negative for fever.   HENT: Negative for sore throat.    Respiratory: Negative for shortness of breath.    Cardiovascular: Negative for chest pain.   Gastrointestinal: Positive for abdominal  pain. Negative for nausea.   Genitourinary: Negative for dysuria.   Musculoskeletal: Negative for back pain.   Skin: Negative for rash.   Neurological: Negative for weakness.   Psychiatric/Behavioral: Negative for confusion.   All other systems reviewed and are negative.      Physical Exam     Initial Vitals [07/07/22 2052]   BP Pulse Resp Temp SpO2   (!) 144/86 64 18 97.8 °F (36.6 °C) 95 %      MAP       --         Physical Exam    Nursing note and vitals reviewed.  Constitutional: He appears well-developed and well-nourished.   HENT:   Head: Normocephalic and atraumatic.   Eyes: Conjunctivae are normal.   Cardiovascular: Normal rate and regular rhythm.   Pulmonary/Chest: He has no wheezes.   Abdominal: Abdomen is soft. There is abdominal tenderness.   Musculoskeletal:         General: Normal range of motion.     Neurological: He is alert and oriented to person, place, and time.   Skin: Skin is warm and dry.   Psychiatric: He has a normal mood and affect. His speech is normal.         ED Course   Procedures  Labs Reviewed   CBC W/ AUTO DIFFERENTIAL - Abnormal; Notable for the following components:       Result Value    RBC 3.82 (*)     Hemoglobin 12.0 (*)     Hematocrit 35.3 (*)     MCH 31.4 (*)     Platelets 104 (*)     Lymph # 0.8 (*)     Lymph % 16.0 (*)     All other components within normal limits   COMPREHENSIVE METABOLIC PANEL - Abnormal; Notable for the following components:    Glucose 119 (*)     Total Bilirubin 4.5 (*)      (*)     ALT 98 (*)     All other components within normal limits   LIPASE - Abnormal; Notable for the following components:    Lipase 73 (*)     All other components within normal limits   URINALYSIS, REFLEX TO URINE CULTURE - Abnormal; Notable for the following components:    Specific Gravity, UA >1.030 (*)     Protein, UA 1+ (*)     Bilirubin (UA) 2+ (*)     Urobilinogen, UA >=8.0 (*)     All other components within normal limits    Narrative:     Specimen Source->Urine    URINALYSIS MICROSCOPIC - Abnormal; Notable for the following components:    Hyaline Casts, UA 5 (*)     All other components within normal limits    Narrative:     Specimen Source->Urine   TROPONIN I   HEPATITIS PANEL, ACUTE   SARS-COV-2 RNA AMPLIFICATION, QUAL          Imaging Results          CT Abdomen Pelvis With Contrast (Final result)  Result time 07/08/22 02:01:18    Final result by Milo Hartmann MD (07/08/22 02:01:18)                 Narrative:    EXAM DESCRIPTION:  CT ABDOMEN PELVIS WITH CONTRAST    CLINICAL HISTORY:  81 years Male, Abdominal pain, acute, nonlocalized    TECHNIQUE: Helical CT axial images are obtained from the lung bases to the pubic symphysis with IV contrast. No oral contrast was administered. Multiplanar reconstruction. This exam was performed according to our departmental dose-optimization program, which includes automated exposure control, adjustment of the mA and/or kV according to patient size and/or use of iterative reconstruction technique.    COMPARISON: CT abdomen pelvis 5/23/2022, right upper quadrant ultrasound 7/7/2022      FINDINGS:  LUNG BASES: No basilar consolidation or effusions.    LIVER:  Normal in size. Mild diffuse decreased attenuation. Segment 4A simple cyst measuring 1.2 cm, no further workup is warranted. No suspicious hepatic masses. Subcentimeter periportal lymph nodes, of doubtful clinical significance.    HEPATOBILIARY: Cholelithiasis.  No intra- or extrahepatic ductal dilatation.    SPLEEN: Normal size.    PANCREAS: Mild fatty atrophic changes. No focal abnormality.    ADRENAL GLANDS: Normal size. No adrenal masses.    KIDNEYS:  Bilateral kidneys are normal in size without obstructing calculi or hydronephrosis. No nephrolithiasis. Right midpole 2.0 cm simple renal cyst, no further workup is warranted. No focal solid mass.    BOWEL AND MESENTERY: No small or large bowel dilatation. Pandiverticulosis.  The appendix is not identified. No abnormal  mesenteric lymphadenopathy.  No pneumoperitoneum. Trace pelvic free fluid.    RETROPERITONEUM: Normal caliber abdominal aorta without aneurysm. Moderate ASVD.  No abnormal retroperitoneal lymphadenopathy.    PELVIS:  Mild prostatomegaly impressing upon the base of the urinary bladder. Otherwise suboptimally distended urinary bladder.    ABDOMINAL WALL: The abdominal wall is intact.    BONES: No suspicious osseous lytic or blastic lesions seen.      IMPRESSION:  1.  No acute intra-abdominal or pelvic disease.  2.  Trace pelvic free fluid, nonspecific.  3.  Cholelithiasis.  4.  Pandiverticulosis without diverticulitis.  5.  Mild prostatomegaly.    Electronically signed by:  Milo Hartmann MD  7/8/2022 2:01 AM CDT Workstation: [id number]                             US Abdomen Limited (Final result)  Result time 07/08/22 00:00:42    Final result by Estebna Barcenas DO (07/08/22 00:00:42)                 Narrative:    CLINICAL HISTORY: Generalized abdominal pain    COMPARISON: CT abdomen pelvis 5/23/2022    TECHNIQUE: Routine transabdominal ultrasonography was performed with attention to the right upper quadrant.    FINDINGS:  Portions of the liver and gallbladder are obscured by shadowing from overlying bowel gas, limiting evaluation. The visualized liver parenchyma is homogeneous in echotexture. Normal flow is seen in the main portal vein.    There is no intra- or extrahepatic biliary dilatation. The common bile duct measures 5    mm in diameter. There are echogenic shadowing and nonshadowing material in the gallbladder lumen suggestive of biliary sludge and cholelithiasis. No significant gallbladder wall thickening or pericholecystic fluid. The sonographic Padilla's sign is negative.    Limited evaluation of the head and body of the pancreas is unremarkable.    The right kidney measures 11.6 cm and appears unremarkable with no evidence of shadowing renal stone or hydronephrosis. The visualized portions of the IVC  and aorta are unremarkable. There is no evidence of ascites.    IMPRESSION:  Biliary sludge and cholelithiasis without sonographic evidence of acute cholecystitis.    Electronically signed by:  Esteban Barcenas DO  7/8/2022 12:00 AM CDT Workstation: [id number]                               Medications   HYDROmorphone injection 0.5 mg (0.5 mg Intravenous Given 7/8/22 0102)   iohexoL (OMNIPAQUE 350) injection 100 mL (100 mLs Intravenous Given 7/8/22 0054)     Medical Decision Making:   Initial Assessment:   Seen and evaluated presented with chief complaint of abdominal distention and pain.  Chart review shows normally he has stable bilirubin in hepatic enzymes.  Here tonight he has an acutely elevated bilirubin to 4.5.  CT is stable, ultrasound shows sludge.  Discussed with GI.  Patient will likely need MRCP.  He will be admitted by Hospital Medicine for further evaluation and treatment                      Clinical Impression:   Final diagnoses:  [R10.10] Pain of upper abdomen (Primary)  [E80.6] Hyperbilirubinemia                 Marino Gan Jr., MD  07/08/22 0250

## 2022-07-08 NOTE — HPI
"81 year old male with history DM 2 (diet controlled), HTN, PAF, COPD, Cholelithiasis and has not been vaccinated against COVID presented to ED complaining of month history of right sided upper abdominal/flank pain 8/10 sharp stabbing waxing and waning own accord with radiations around to his back. He reported that he saw a General Surgeon (Zack), who recommended removing his GB. However the patient stated he "Was not ready yet" for the procedure. No N/V. No change in BM. He stated he has "An old pacemaker" for heart block, which is not MRI compatible. He is on warfarin for PAF.    In ED: Labs reviewed and noted below: no leukocytosis with mild normocytic anemia; normal electrolytes and renal function; elevated bilirubin, transaminases and lipase; troponin is normal. U/A:  2+ bilirubin. CT Abdomen reviewed: cholelithiasis. No evidence of cholecystitis on U/S. % paced. Discussed with ED MD: admit, obs currently, cannot get MRCP because of above PPM; GI consultation for ERCP; clears currently; analgesia prn  "

## 2022-07-08 NOTE — SUBJECTIVE & OBJECTIVE
Past Medical History:   Diagnosis Date    Arthritis     Atrial fibrillation     CKD (chronic kidney disease)     COPD (chronic obstructive pulmonary disease)     Diabetes mellitus, type 2     Gout     Heart block     Hypertension     Pancreatitis     Peripheral vascular disease        Past Surgical History:   Procedure Laterality Date    APPENDECTOMY      CARDIAC PACEMAKER PLACEMENT  2010    EYE SURGERY      INGUINAL HERNIA REPAIR Left        Review of patient's allergies indicates:   Allergen Reactions    Statins-hmg-coa reductase inhibitors        No current facility-administered medications on file prior to encounter.     Current Outpatient Medications on File Prior to Encounter   Medication Sig    allopurinoL (ZYLOPRIM) 100 MG tablet TAKE 1 TABLET(100 MG) BY MOUTH EVERY DAY (Patient taking differently: Take 100 mg by mouth once daily.)    benazepril (LOTENSIN) 40 MG tablet Take 40 mg by mouth once daily.    bumetanide (BUMEX) 1 MG tablet Take 1 mg by mouth once daily.    hydrOXYchloroQUINE (PLAQUENIL) 200 mg tablet TAKE 1 TABLET(200 MG) BY MOUTH EVERY DAY    metoprolol succinate (TOPROL-XL) 50 MG 24 hr tablet Take 50 mg by mouth once daily.    ondansetron (ZOFRAN-ODT) 4 MG TbDL Take 1 tablet (4 mg total) by mouth every 6 (six) hours as needed. (Patient not taking: Reported on 5/24/2022)    potassium chloride (KLOR-CON) 10 MEQ TbSR Take 10 mEq by mouth once daily.    SYMBICORT 160-4.5 mcg/actuation HFAA Inhale 1 puff into the lungs as needed.    terazosin (HYTRIN) 5 MG capsule Take 5 mg by mouth every evening.    vitamin D (VITAMIN D3) 1000 units Tab Take 5,000 Units by mouth.    warfarin (COUMADIN) 5 MG tablet Take 7.5 mg by mouth Daily.    [DISCONTINUED] donepeziL (ARICEPT) 5 MG tablet Take 5 mg by mouth once daily.     Family History       Problem Relation (Age of Onset)    Diabetes Mellitus Mother          Tobacco Use    Smoking status: Former Smoker    Smokeless tobacco: Never Used   Substance and Sexual  Activity    Alcohol use: No    Drug use: No    Sexual activity: Not on file     Review of Systems   Constitutional: Negative.    HENT: Negative.     Eyes: Negative.    Respiratory: Negative.     Cardiovascular: Negative.    Gastrointestinal:  Positive for abdominal distention and abdominal pain.   Endocrine: Negative.    Genitourinary: Negative.    Musculoskeletal: Negative.    Skin: Negative.    Allergic/Immunologic: Negative.    Neurological: Negative.    Hematological: Negative.    All other systems reviewed and are negative.  Objective:     Vital Signs (Most Recent):  Temp: 97.8 °F (36.6 °C) (07/07/22 2052)  Pulse: 60 (07/08/22 0102)  Resp: 18 (07/08/22 0102)  BP: 124/72 (07/08/22 0102)  SpO2: 96 % (07/08/22 0102) Vital Signs (24h Range):  Temp:  [97.8 °F (36.6 °C)] 97.8 °F (36.6 °C)  Pulse:  [60-64] 60  Resp:  [18] 18  SpO2:  [95 %-96 %] 96 %  BP: (124-144)/(72-86) 124/72     Weight: 104.5 kg (230 lb 6.1 oz)  Body mass index is 31.25 kg/m².    Physical Exam  Vitals and nursing note reviewed.   Constitutional:       Appearance: He is well-developed.   HENT:      Head: Normocephalic and atraumatic.      Right Ear: External ear normal.      Left Ear: External ear normal.      Nose: Nose normal.   Eyes:      Conjunctiva/sclera: Conjunctivae normal.      Pupils: Pupils are equal, round, and reactive to light.   Cardiovascular:      Rate and Rhythm: Normal rate. Rhythm irregular.      Heart sounds: Normal heart sounds.   Pulmonary:      Effort: Pulmonary effort is normal.      Breath sounds: Normal breath sounds.   Abdominal:      General: Bowel sounds are normal.      Palpations: Abdomen is soft.      Comments: Soft with voluntary guarding, no peritoneal; BS+   Musculoskeletal:         General: Normal range of motion.      Cervical back: Normal range of motion and neck supple.   Skin:     General: Skin is warm and dry.      Capillary Refill: Capillary refill takes less than 2 seconds.   Neurological:      Mental  Status: He is alert and oriented to person, place, and time.   Psychiatric:         Behavior: Behavior normal.         Thought Content: Thought content normal.         Judgment: Judgment normal.         CRANIAL NERVES     CN III, IV, VI   Pupils are equal, round, and reactive to light.     Significant Labs: All pertinent labs within the past 24 hours have been reviewed.  CBC:   Recent Labs   Lab 07/07/22 2119   WBC 4.81   HGB 12.0*   HCT 35.3*   *     CMP:   Recent Labs   Lab 07/07/22 2119      K 3.7      CO2 27   *   BUN 18   CREATININE 0.9   CALCIUM 9.2   PROT 7.3   ALBUMIN 4.4   BILITOT 4.5*   ALKPHOS 98   *   ALT 98*   ANIONGAP 9   EGFRNONAA >60.0     Cardiac Markers: No results for input(s): CKMB, MYOGLOBIN, BNP, TROPISTAT in the last 48 hours.  Troponin:   Recent Labs   Lab 07/07/22 2119   TROPONINI <0.030       Significant Imaging: I have reviewed all pertinent imaging results/findings within the past 24 hours.

## 2022-07-09 LAB
ALBUMIN SERPL BCP-MCNC: 3.9 G/DL (ref 3.5–5.2)
ALP SERPL-CCNC: 95 U/L (ref 55–135)
ALT SERPL W/O P-5'-P-CCNC: 99 U/L (ref 10–44)
ANION GAP SERPL CALC-SCNC: 6 MMOL/L (ref 8–16)
AST SERPL-CCNC: 75 U/L (ref 10–40)
BASOPHILS # BLD AUTO: 0.05 K/UL (ref 0–0.2)
BASOPHILS NFR BLD: 1.1 % (ref 0–1.9)
BILIRUB SERPL-MCNC: 2.5 MG/DL (ref 0.1–1)
BUN SERPL-MCNC: 16 MG/DL (ref 8–23)
CALCIUM SERPL-MCNC: 8.7 MG/DL (ref 8.7–10.5)
CHLORIDE SERPL-SCNC: 102 MMOL/L (ref 95–110)
CO2 SERPL-SCNC: 29 MMOL/L (ref 23–29)
CREAT SERPL-MCNC: 0.8 MG/DL (ref 0.5–1.4)
DIFFERENTIAL METHOD: ABNORMAL
EOSINOPHIL # BLD AUTO: 0.2 K/UL (ref 0–0.5)
EOSINOPHIL NFR BLD: 3.3 % (ref 0–8)
ERYTHROCYTE [DISTWIDTH] IN BLOOD BY AUTOMATED COUNT: 13.6 % (ref 11.5–14.5)
EST. GFR  (AFRICAN AMERICAN): >60 ML/MIN/1.73 M^2
EST. GFR  (NON AFRICAN AMERICAN): >60 ML/MIN/1.73 M^2
GLUCOSE SERPL-MCNC: 98 MG/DL (ref 70–110)
HAV IGM SERPL QL IA: NEGATIVE
HBV CORE IGM SERPL QL IA: NEGATIVE
HBV SURFACE AG SERPL QL IA: NEGATIVE
HCT VFR BLD AUTO: 33.3 % (ref 40–54)
HCV AB S/CO SERPL IA: 0.2 S/CO RATIO (ref 0–0.9)
HCV AB SERPL QL IA: NORMAL
HGB BLD-MCNC: 11.3 G/DL (ref 14–18)
IMM GRANULOCYTES # BLD AUTO: 0.01 K/UL (ref 0–0.04)
IMM GRANULOCYTES NFR BLD AUTO: 0.2 % (ref 0–0.5)
INR PPP: 3.1
INR PPP: 3.1
LYMPHOCYTES # BLD AUTO: 1.1 K/UL (ref 1–4.8)
LYMPHOCYTES NFR BLD: 23.9 % (ref 18–48)
MCH RBC QN AUTO: 31.5 PG (ref 27–31)
MCHC RBC AUTO-ENTMCNC: 33.9 G/DL (ref 32–36)
MCV RBC AUTO: 93 FL (ref 82–98)
MONOCYTES # BLD AUTO: 0.5 K/UL (ref 0.3–1)
MONOCYTES NFR BLD: 10.4 % (ref 4–15)
NEUTROPHILS # BLD AUTO: 2.8 K/UL (ref 1.8–7.7)
NEUTROPHILS NFR BLD: 61.1 % (ref 38–73)
NRBC BLD-RTO: 0 /100 WBC
PLATELET # BLD AUTO: 94 K/UL (ref 150–450)
PMV BLD AUTO: 10.9 FL (ref 9.2–12.9)
POTASSIUM SERPL-SCNC: 3.8 MMOL/L (ref 3.5–5.1)
PROT SERPL-MCNC: 6.6 G/DL (ref 6–8.4)
PROTHROMBIN TIME: 30.4 SEC (ref 11.4–13.7)
PROTHROMBIN TIME: 30.4 SEC (ref 11.4–13.7)
RBC # BLD AUTO: 3.59 M/UL (ref 4.6–6.2)
SODIUM SERPL-SCNC: 137 MMOL/L (ref 136–145)
WBC # BLD AUTO: 4.52 K/UL (ref 3.9–12.7)

## 2022-07-09 PROCEDURE — 25000003 PHARM REV CODE 250: Performed by: INTERNAL MEDICINE

## 2022-07-09 PROCEDURE — 80053 COMPREHEN METABOLIC PANEL: CPT | Performed by: INTERNAL MEDICINE

## 2022-07-09 PROCEDURE — 85610 PROTHROMBIN TIME: CPT | Performed by: INTERNAL MEDICINE

## 2022-07-09 PROCEDURE — 96365 THER/PROPH/DIAG IV INF INIT: CPT

## 2022-07-09 PROCEDURE — 85025 COMPLETE CBC W/AUTO DIFF WBC: CPT | Performed by: INTERNAL MEDICINE

## 2022-07-09 PROCEDURE — 36415 COLL VENOUS BLD VENIPUNCTURE: CPT | Performed by: INTERNAL MEDICINE

## 2022-07-09 PROCEDURE — G0378 HOSPITAL OBSERVATION PER HR: HCPCS

## 2022-07-09 PROCEDURE — 63600175 PHARM REV CODE 636 W HCPCS: Performed by: INTERNAL MEDICINE

## 2022-07-09 RX ADMIN — PHYTONADIONE 5 MG: 10 INJECTION, EMULSION INTRAMUSCULAR; INTRAVENOUS; SUBCUTANEOUS at 10:07

## 2022-07-09 RX ADMIN — LISINOPRIL 40 MG: 20 TABLET ORAL at 09:07

## 2022-07-09 RX ADMIN — METOPROLOL SUCCINATE 25 MG: 25 TABLET, EXTENDED RELEASE ORAL at 08:07

## 2022-07-09 RX ADMIN — ALLOPURINOL 100 MG: 100 TABLET ORAL at 09:07

## 2022-07-09 RX ADMIN — HYDROXYCHLOROQUINE SULFATE 200 MG: 200 TABLET, FILM COATED ORAL at 09:07

## 2022-07-09 RX ADMIN — Medication 5000 UNITS: at 09:07

## 2022-07-09 RX ADMIN — TAMSULOSIN HYDROCHLORIDE 0.4 MG: 0.4 CAPSULE ORAL at 09:07

## 2022-07-09 RX ADMIN — METOPROLOL SUCCINATE 25 MG: 25 TABLET, EXTENDED RELEASE ORAL at 09:07

## 2022-07-09 RX ADMIN — BUMETANIDE 1 MG: 1 TABLET ORAL at 09:07

## 2022-07-09 NOTE — PROGRESS NOTES
[facility]  General Surgery  Progress Note    Subjective:     History of Present Illness:  81-year-old male presents with generalized abdominal pain.  He reports the pain was slightly worse on the right but also on the left and somewhat general throughout his abdomen.  He did report associated nausea.  He does not describe the pain in any way but reports it is uncomfortable.  He did not vomit.  He presented to the ER after having a few days this pain.  He reports the pain has happened in the past 1 time specifically he recalls was associated with gallstones.  He does report the pain was slightly worse on the right side at that episode but not this episode.  He had talked to a surgeon about having his gallbladder removed in the past.  He takes Coumadin for AFib and is currently on this at this time.      Post-Op Info:  * No surgery found *         Interval History: feels better today    Medications:  Continuous Infusions:  Scheduled Meds:   allopurinoL  100 mg Oral Daily    bumetanide  1 mg Oral Daily    fluticasone furoate-vilanteroL  1 puff Inhalation Daily    hydrOXYchloroQUINE  200 mg Oral Daily    lisinopriL  40 mg Oral Daily    metoprolol succinate  25 mg Oral BID    tamsulosin  0.4 mg Oral Daily    vitamin D  5,000 Units Oral Daily     PRN Meds:acetaminophen, calcium chloride IVPB, calcium chloride IVPB, calcium chloride IVPB, dextrose 10%, dextrose 10%, HYDROmorphone, insulin aspart U-100, magnesium oxide, magnesium sulfate IVPB, magnesium sulfate IVPB, magnesium sulfate IVPB, magnesium sulfate IVPB, melatonin, ondansetron, potassium chloride, potassium chloride, potassium chloride, potassium chloride, sodium phosphate IVPB, sodium phosphate IVPB, sodium phosphate IVPB, sodium phosphate IVPB, sodium phosphate IVPB     Review of patient's allergies indicates:   Allergen Reactions    Statins-hmg-coa reductase inhibitors      Objective:     Vital Signs (Most Recent):  Temp: 97.9 °F (36.6  °C) (07/09/22 1041)  Pulse: 62 (07/09/22 1041)  Resp: 18 (07/09/22 1041)  BP: (!) 142/73 (07/09/22 1041)  SpO2: 97 % (07/09/22 1041)   Vital Signs (24h Range):  Temp:  [97.5 °F (36.4 °C)-98.5 °F (36.9 °C)] 97.9 °F (36.6 °C)  Pulse:  [58-72] 62  Resp:  [17-32] 18  SpO2:  [95 %-97 %] 97 %  BP: (107-147)/(61-78) 142/73     Weight: 106.1 kg (233 lb 12.8 oz)  Body mass index is 31.71 kg/m².    Intake/Output - Last 3 Shifts         07/07 0700  07/08 0659 07/08 0700 07/09 0659 07/09 0700  07/10 0659    P.O.  1130     Total Intake(mL/kg)  1130 (10.7)     Urine (mL/kg/hr)  725 (0.3)     Total Output  725     Net  +405            Urine Occurrence  1 x             Physical Exam  Eyes:      Comments: Resolution of icterus     Abdominal:      General: Abdomen is flat. There is no distension.      Palpations: Abdomen is soft.      Tenderness: There is no abdominal tenderness.       Significant Labs:  I have reviewed all pertinent lab results within the past 24 hours.  CBC:   Recent Labs   Lab 07/09/22  0457   WBC 4.52   RBC 3.59*   HGB 11.3*   HCT 33.3*   PLT 94*   MCV 93   MCH 31.5*   MCHC 33.9     BMP:   Recent Labs   Lab 07/09/22  0457   GLU 98      K 3.8      CO2 29   BUN 16   CREATININE 0.8   CALCIUM 8.7       Significant Diagnostics:  I have reviewed all pertinent imaging results/findings within the past 24 hours.    Assessment/Plan:     * Calculus of bile duct without cholecystitis with obstruction  Bili dropping indicating passage of stone/s  Monitor INR- would like less than 1.5 for surgery  Plan for surgery Monday- lap latonia  Diet as tolerated until mn mon        Santa Cardona MD  General Surgery  [facility]

## 2022-07-09 NOTE — ASSESSMENT & PLAN NOTE
Bili dropping indicating passage of stone/s  Monitor INR- would like less than 1.5 for surgery  Plan for surgery Monday- lap latonia  Diet as tolerated until mn mon

## 2022-07-09 NOTE — SUBJECTIVE & OBJECTIVE
Interval History: feels better today    Medications:  Continuous Infusions:  Scheduled Meds:   allopurinoL  100 mg Oral Daily    bumetanide  1 mg Oral Daily    fluticasone furoate-vilanteroL  1 puff Inhalation Daily    hydrOXYchloroQUINE  200 mg Oral Daily    lisinopriL  40 mg Oral Daily    metoprolol succinate  25 mg Oral BID    tamsulosin  0.4 mg Oral Daily    vitamin D  5,000 Units Oral Daily     PRN Meds:acetaminophen, calcium chloride IVPB, calcium chloride IVPB, calcium chloride IVPB, dextrose 10%, dextrose 10%, HYDROmorphone, insulin aspart U-100, magnesium oxide, magnesium sulfate IVPB, magnesium sulfate IVPB, magnesium sulfate IVPB, magnesium sulfate IVPB, melatonin, ondansetron, potassium chloride, potassium chloride, potassium chloride, potassium chloride, sodium phosphate IVPB, sodium phosphate IVPB, sodium phosphate IVPB, sodium phosphate IVPB, sodium phosphate IVPB     Review of patient's allergies indicates:   Allergen Reactions    Statins-hmg-coa reductase inhibitors      Objective:     Vital Signs (Most Recent):  Temp: 97.9 °F (36.6 °C) (07/09/22 1041)  Pulse: 62 (07/09/22 1041)  Resp: 18 (07/09/22 1041)  BP: (!) 142/73 (07/09/22 1041)  SpO2: 97 % (07/09/22 1041)   Vital Signs (24h Range):  Temp:  [97.5 °F (36.4 °C)-98.5 °F (36.9 °C)] 97.9 °F (36.6 °C)  Pulse:  [58-72] 62  Resp:  [17-32] 18  SpO2:  [95 %-97 %] 97 %  BP: (107-147)/(61-78) 142/73     Weight: 106.1 kg (233 lb 12.8 oz)  Body mass index is 31.71 kg/m².    Intake/Output - Last 3 Shifts         07/07 0700 07/08 0659 07/08 0700 07/09 0659 07/09 0700  07/10 0659    P.O.  1130     Total Intake(mL/kg)  1130 (10.7)     Urine (mL/kg/hr)  725 (0.3)     Total Output  725     Net  +405            Urine Occurrence  1 x             Physical Exam  Eyes:      Comments: Resolution of icterus     Abdominal:      General: Abdomen is flat. There is no distension.      Palpations: Abdomen is soft.      Tenderness: There is no abdominal tenderness.        Significant Labs:  I have reviewed all pertinent lab results within the past 24 hours.  CBC:   Recent Labs   Lab 07/09/22 0457   WBC 4.52   RBC 3.59*   HGB 11.3*   HCT 33.3*   PLT 94*   MCV 93   MCH 31.5*   MCHC 33.9     BMP:   Recent Labs   Lab 07/09/22 0457   GLU 98      K 3.8      CO2 29   BUN 16   CREATININE 0.8   CALCIUM 8.7       Significant Diagnostics:  I have reviewed all pertinent imaging results/findings within the past 24 hours.

## 2022-07-09 NOTE — PROGRESS NOTES
[facility] Medicine Progress Note  Patient Name: Kennedy Thomas MRN: [id number]   Patient Class: OP- Observation  Length of Stay: 0   Admission Date: 7/7/2022  8:51 PM Attending Physician: Jaja Berg MD   Primary Care Provider: Fernando Eagle MD Face-to-Face encounter date: 07/09/2022   Chief Complaint: Flank Pain (Right lower back pain and right flank pain starting today, denies n/v/d)      Subjective:    Interval History   Patient is doing well.    Denies chest pain, shortness of breath, palpitations, abdominal pain, nausea/vomiting.   No concerns/issues overnight reported by the patient or the nursing staff.  Reviewed the labs and discussed the plan of care.   No family present at bedside.     Review of Systems   All other Review of Systems were found to be negative expect for that mentioned already in HPI.     Objective:   Physical Exam  BP (!) 142/73   Pulse 62   Temp 97.9 °F (36.6 °C) (Oral)   Resp 18   Ht 6' (1.829 m)   Wt 106.1 kg (233 lb 12.8 oz)   SpO2 97%   BMI 31.71 kg/m²   Constitutional: No distress.   HENT: Atraumatic.   Cardiovascular: Normal rate, regular rhythm and normal heart sounds.   Pulmonary/Chest: Effort normal. Clear to auscultation bilaterally. No wheezes.   Abdominal: Soft. Bowel sounds are normal. Exhibits no distension and no mass. No tenderness  Neurological: Alert.   Skin: Skin is warm and dry.     Labs and Imaging    Significant Labs: All pertinent labs within the past 24 hours have been reviewed.    Significant Imaging: I have reviewed all pertinent imaging results/findings within the past 24 hours.    I have reviewed the Vitals, labs and imaging as above.     Assessment & Plan:   Kennedy Thomas is a 81 y.o. male admitted for    Calculus of bile duct without cholecystitis with obstruction - improving bilirubin, IV vitamin K given for INR, daily INR, Surgery planned Monday, may not need ERCP since bilirubin has improved    Core measures:  -  Code status: full code    - Diet: Regular  VTE Risk Mitigation (From admission, onward)         Ordered     IP VTE HIGH RISK PATIENT  Once         07/08/22 0346     Place sequential compression device  Until discontinued         07/08/22 0346     Reason for No Pharmacological VTE Prophylaxis  Once        Question:  Reasons:  Answer:  Already adequately anticoagulated on oral Anticoagulants    07/08/22 0346                Discharge Planning:   Discharge Planning   NANCIE: 07/11/2022    Code Status: Full Code   Is the patient medically ready for discharge?: No    Reason for patient still in hospital (select all that apply): Patient trending condition  Discharge Plan A: Home with assistance from family        Above encounter included review of the medical records, interviewing and examining the patient face-to-face, discussion with family and other health care providers, ordering and interpreting lab/test results and formulating a plan of care.     Medical Decision Making:  [] Low Complexity  [x] Moderate Complexity  [] High Complexity    Jaja Berg MD  [facility] Hospitalist  07/09/2022

## 2022-07-09 NOTE — PROGRESS NOTES
Patient Name: Kennedy Thomas  Patient MRN: [id number]  Patient : 1940    Admit Date: 2022  Service date: 2022    Reason for Consult: gallstones / elevated LFTs    PCP: Fernando Eagle MD    S: Pain resolved. No f/c. No dark urine / jaundice.     Inpatient Medications:   allopurinoL  100 mg Oral Daily    bumetanide  1 mg Oral Daily    fluticasone furoate-vilanteroL  1 puff Inhalation Daily    hydrOXYchloroQUINE  200 mg Oral Daily    lisinopriL  40 mg Oral Daily    metoprolol succinate  25 mg Oral BID    tamsulosin  0.4 mg Oral Daily    vitamin D  5,000 Units Oral Daily     acetaminophen, calcium chloride IVPB, calcium chloride IVPB, calcium chloride IVPB, dextrose 10%, dextrose 10%, HYDROmorphone, insulin aspart U-100, magnesium oxide, magnesium sulfate IVPB, magnesium sulfate IVPB, magnesium sulfate IVPB, magnesium sulfate IVPB, melatonin, ondansetron, potassium chloride, potassium chloride, potassium chloride, potassium chloride, sodium phosphate IVPB, sodium phosphate IVPB, sodium phosphate IVPB, sodium phosphate IVPB, sodium phosphate IVPB    Review of Systems:  A 10 point review of systems was performed and was normal, except as mentioned in the HPI, including constitutional, HEENT, heme, lymph, cardiovascular, respiratory, gastrointestinal, genitourinary, neurologic, endocrine, psychiatric and musculoskeletal.      OBJECTIVE:    Physical Exam:  24 Hour Vital Sign Ranges: Temp:  [97.5 °F (36.4 °C)-98.5 °F (36.9 °C)] 97.9 °F (36.6 °C)  Pulse:  [58-72] 62  Resp:  [17-32] 18  SpO2:  [95 %-97 %] 97 %  BP: (107-147)/(61-78) 142/73  Most recent vitals: BP (!) 142/73   Pulse 62   Temp 97.9 °F (36.6 °C) (Oral)   Resp 18   Ht 6' (1.829 m)   Wt 106.1 kg (233 lb 12.8 oz)   SpO2 97%   BMI 31.71 kg/m²    GEN: well-developed, well-nourished, awake and alert, non-toxic appearing adult  HEENT: PERRL, sclera anicteric, oral mucosa pink and moist without lesion  NECK: trachea midline; Good ROM  CV:  regular rate and rhythm, no murmurs or gallops  RESP: clear to auscultation bilaterally, no wheezes, rhonci or rales  ABD: soft, non-tender, non-distended, normal bowel sounds; umbilical hernia  EXT: no swelling or edema, 2+ pulses distally  SKIN: no rashes or jaundice  PSYCH: normal affect    Labs:   Recent Labs     07/07/22 2119 07/08/22 0407 07/09/22 0457   WBC 4.81 4.65 4.52   MCV 92 93 93   * 95* 94*     Recent Labs     07/07/22 2119 07/08/22 0407 07/09/22 0457    137 137   K 3.7 3.8 3.8    103 102   CO2 27 30* 29   BUN 18 18 16   * 104 98     No results for input(s): ALB in the last 72 hours.    Invalid input(s): ALKP, SGOT, SGPT, TBIL, DBIL, TPRO  Recent Labs     07/08/22 0407 07/09/22 0457   INR 2.5 3.1  3.1         Radiology Review:  CT Abdomen Pelvis With Contrast   Final Result      US Abdomen Limited   Final Result            IMPRESSION / RECOMMENDATIONS:  82 y/o WM w/ h/o DM, HTN, BPH, Afib/pacer (Coumadin), COPD, gallstones, appy, hernia repair, gout, diverticulosis presents for evaluation of epig / RUQ pain. On admission, RUQ / CT done w/ gallstones/sludge w/ non-dilated biliary system. Also w/ mild elevation in LFTs / bili which has since improved w/ resolution of pain suspicious for passed stone / sludge.     -Conservative for now from GI perspective  -Agree w/ lap latonia +/- IOC as warranted  -If LFTs increase or any concerns, can consider pre-op EUS if desired by surgeon (Cant get MRI)    Dale Luciano III  7/9/2022  11:49 AM

## 2022-07-10 LAB
ALBUMIN SERPL BCP-MCNC: 4.1 G/DL (ref 3.5–5.2)
ALP SERPL-CCNC: 89 U/L (ref 55–135)
ALT SERPL W/O P-5'-P-CCNC: 76 U/L (ref 10–44)
ANION GAP SERPL CALC-SCNC: 8 MMOL/L (ref 8–16)
AST SERPL-CCNC: 53 U/L (ref 10–40)
BASOPHILS # BLD AUTO: 0.05 K/UL (ref 0–0.2)
BASOPHILS NFR BLD: 1.1 % (ref 0–1.9)
BILIRUB SERPL-MCNC: 3.1 MG/DL (ref 0.1–1)
BUN SERPL-MCNC: 14 MG/DL (ref 8–23)
CALCIUM SERPL-MCNC: 9.2 MG/DL (ref 8.7–10.5)
CHLORIDE SERPL-SCNC: 99 MMOL/L (ref 95–110)
CO2 SERPL-SCNC: 29 MMOL/L (ref 23–29)
CREAT SERPL-MCNC: 0.8 MG/DL (ref 0.5–1.4)
DIFFERENTIAL METHOD: ABNORMAL
EOSINOPHIL # BLD AUTO: 0.2 K/UL (ref 0–0.5)
EOSINOPHIL NFR BLD: 3.4 % (ref 0–8)
ERYTHROCYTE [DISTWIDTH] IN BLOOD BY AUTOMATED COUNT: 13.6 % (ref 11.5–14.5)
EST. GFR  (AFRICAN AMERICAN): >60 ML/MIN/1.73 M^2
EST. GFR  (NON AFRICAN AMERICAN): >60 ML/MIN/1.73 M^2
GLUCOSE SERPL-MCNC: 100 MG/DL (ref 70–110)
HCT VFR BLD AUTO: 36.1 % (ref 40–54)
HGB BLD-MCNC: 12 G/DL (ref 14–18)
IMM GRANULOCYTES # BLD AUTO: 0.01 K/UL (ref 0–0.04)
IMM GRANULOCYTES NFR BLD AUTO: 0.2 % (ref 0–0.5)
INR PPP: 1.7
INR PPP: 1.7
LYMPHOCYTES # BLD AUTO: 1 K/UL (ref 1–4.8)
LYMPHOCYTES NFR BLD: 22.8 % (ref 18–48)
MCH RBC QN AUTO: 31.2 PG (ref 27–31)
MCHC RBC AUTO-ENTMCNC: 33.2 G/DL (ref 32–36)
MCV RBC AUTO: 94 FL (ref 82–98)
MONOCYTES # BLD AUTO: 0.5 K/UL (ref 0.3–1)
MONOCYTES NFR BLD: 11.7 % (ref 4–15)
NEUTROPHILS # BLD AUTO: 2.7 K/UL (ref 1.8–7.7)
NEUTROPHILS NFR BLD: 60.8 % (ref 38–73)
NRBC BLD-RTO: 0 /100 WBC
PLATELET # BLD AUTO: 99 K/UL (ref 150–450)
PMV BLD AUTO: 11.4 FL (ref 9.2–12.9)
POTASSIUM SERPL-SCNC: 3.7 MMOL/L (ref 3.5–5.1)
PROT SERPL-MCNC: 7 G/DL (ref 6–8.4)
PROTHROMBIN TIME: 19.1 SEC (ref 11.4–13.7)
PROTHROMBIN TIME: 19.1 SEC (ref 11.4–13.7)
RBC # BLD AUTO: 3.85 M/UL (ref 4.6–6.2)
SODIUM SERPL-SCNC: 136 MMOL/L (ref 136–145)
WBC # BLD AUTO: 4.43 K/UL (ref 3.9–12.7)

## 2022-07-10 PROCEDURE — 25000003 PHARM REV CODE 250: Performed by: INTERNAL MEDICINE

## 2022-07-10 PROCEDURE — 85610 PROTHROMBIN TIME: CPT | Performed by: INTERNAL MEDICINE

## 2022-07-10 PROCEDURE — G0378 HOSPITAL OBSERVATION PER HR: HCPCS

## 2022-07-10 PROCEDURE — 36415 COLL VENOUS BLD VENIPUNCTURE: CPT | Performed by: INTERNAL MEDICINE

## 2022-07-10 PROCEDURE — 85025 COMPLETE CBC W/AUTO DIFF WBC: CPT | Performed by: INTERNAL MEDICINE

## 2022-07-10 PROCEDURE — 80053 COMPREHEN METABOLIC PANEL: CPT | Performed by: INTERNAL MEDICINE

## 2022-07-10 RX ADMIN — METOPROLOL SUCCINATE 25 MG: 25 TABLET, EXTENDED RELEASE ORAL at 09:07

## 2022-07-10 RX ADMIN — LISINOPRIL 40 MG: 20 TABLET ORAL at 09:07

## 2022-07-10 RX ADMIN — HYDROXYCHLOROQUINE SULFATE 200 MG: 200 TABLET, FILM COATED ORAL at 09:07

## 2022-07-10 RX ADMIN — ALLOPURINOL 100 MG: 100 TABLET ORAL at 09:07

## 2022-07-10 RX ADMIN — BUMETANIDE 1 MG: 1 TABLET ORAL at 09:07

## 2022-07-10 RX ADMIN — TAMSULOSIN HYDROCHLORIDE 0.4 MG: 0.4 CAPSULE ORAL at 09:07

## 2022-07-10 RX ADMIN — Medication 5000 UNITS: at 09:07

## 2022-07-10 NOTE — SUBJECTIVE & OBJECTIVE
Interval History: feels much better    Medications:  Continuous Infusions:  Scheduled Meds:   allopurinoL  100 mg Oral Daily    bumetanide  1 mg Oral Daily    hydrOXYchloroQUINE  200 mg Oral Daily    lisinopriL  40 mg Oral Daily    metoprolol succinate  25 mg Oral BID    tamsulosin  0.4 mg Oral Daily    vitamin D  5,000 Units Oral Daily     PRN Meds:acetaminophen, calcium chloride IVPB, calcium chloride IVPB, calcium chloride IVPB, dextrose 10%, dextrose 10%, HYDROmorphone, insulin aspart U-100, magnesium oxide, magnesium sulfate IVPB, magnesium sulfate IVPB, magnesium sulfate IVPB, magnesium sulfate IVPB, melatonin, ondansetron, potassium chloride, potassium chloride, potassium chloride, potassium chloride, sodium phosphate IVPB, sodium phosphate IVPB, sodium phosphate IVPB, sodium phosphate IVPB, sodium phosphate IVPB     Review of patient's allergies indicates:   Allergen Reactions    Statins-hmg-coa reductase inhibitors      Objective:     Vital Signs (Most Recent):  Temp: 98.2 °F (36.8 °C) (07/10/22 1127)  Pulse: 74 (07/10/22 1127)  Resp: 18 (07/10/22 1127)  BP: (!) 160/86 (07/10/22 1127)  SpO2: 95 % (07/10/22 1127)   Vital Signs (24h Range):  Temp:  [97.4 °F (36.3 °C)-98.7 °F (37.1 °C)] 98.2 °F (36.8 °C)  Pulse:  [60-96] 74  Resp:  [18] 18  SpO2:  [93 %-97 %] 95 %  BP: (134-160)/(74-86) 160/86     Weight: 106.1 kg (233 lb 12.8 oz)  Body mass index is 31.71 kg/m².    Intake/Output - Last 3 Shifts         07/08 0700 07/09 0659 07/09 0700  07/10 0659 07/10 0700 07/11 0659    P.O. 1130      Total Intake(mL/kg) 1130 (10.7)      Urine (mL/kg/hr) 725 (0.3)      Total Output 725      Net +405             Urine Occurrence 1 x 2 x 3 x            Physical Exam  Abdominal:      General: There is no distension.      Palpations: Abdomen is soft.      Tenderness: There is no abdominal tenderness.       Significant Labs:  I have reviewed all pertinent lab results within the past 24 hours.  CBC:   Recent Labs   Lab  07/10/22  0512   WBC 4.43   RBC 3.85*   HGB 12.0*   HCT 36.1*   PLT 99*   MCV 94   MCH 31.2*   MCHC 33.2     CMP:   Recent Labs   Lab 07/10/22  0512      CALCIUM 9.2   ALBUMIN 4.1   PROT 7.0      K 3.7   CO2 29   CL 99   BUN 14   CREATININE 0.8   ALKPHOS 89   ALT 76*   AST 53*   BILITOT 3.1*       Significant Diagnostics:  I have reviewed all pertinent imaging results/findings within the past 24 hours.

## 2022-07-10 NOTE — PROGRESS NOTES
[facility]  General Surgery  Progress Note    Subjective:     History of Present Illness:  81-year-old male presents with generalized abdominal pain.  He reports the pain was slightly worse on the right but also on the left and somewhat general throughout his abdomen.  He did report associated nausea.  He does not describe the pain in any way but reports it is uncomfortable.  He did not vomit.  He presented to the ER after having a few days this pain.  He reports the pain has happened in the past 1 time specifically he recalls was associated with gallstones.  He does report the pain was slightly worse on the right side at that episode but not this episode.  He had talked to a surgeon about having his gallbladder removed in the past.  He takes Coumadin for AFib and is currently on this at this time.      Post-Op Info:  Procedure(s) (LRB):  CHOLECYSTECTOMY, LAPAROSCOPIC, WITH CHOLANGIOGRAM (N/A)         Interval History: feels much better    Medications:  Continuous Infusions:  Scheduled Meds:   allopurinoL  100 mg Oral Daily    bumetanide  1 mg Oral Daily    hydrOXYchloroQUINE  200 mg Oral Daily    lisinopriL  40 mg Oral Daily    metoprolol succinate  25 mg Oral BID    tamsulosin  0.4 mg Oral Daily    vitamin D  5,000 Units Oral Daily     PRN Meds:acetaminophen, calcium chloride IVPB, calcium chloride IVPB, calcium chloride IVPB, dextrose 10%, dextrose 10%, HYDROmorphone, insulin aspart U-100, magnesium oxide, magnesium sulfate IVPB, magnesium sulfate IVPB, magnesium sulfate IVPB, magnesium sulfate IVPB, melatonin, ondansetron, potassium chloride, potassium chloride, potassium chloride, potassium chloride, sodium phosphate IVPB, sodium phosphate IVPB, sodium phosphate IVPB, sodium phosphate IVPB, sodium phosphate IVPB     Review of patient's allergies indicates:   Allergen Reactions    Statins-hmg-coa reductase inhibitors      Objective:     Vital Signs (Most Recent):  Temp: 98.2 °F (36.8 °C)  (07/10/22 1127)  Pulse: 74 (07/10/22 1127)  Resp: 18 (07/10/22 1127)  BP: (!) 160/86 (07/10/22 1127)  SpO2: 95 % (07/10/22 1127)   Vital Signs (24h Range):  Temp:  [97.4 °F (36.3 °C)-98.7 °F (37.1 °C)] 98.2 °F (36.8 °C)  Pulse:  [60-96] 74  Resp:  [18] 18  SpO2:  [93 %-97 %] 95 %  BP: (134-160)/(74-86) 160/86     Weight: 106.1 kg (233 lb 12.8 oz)  Body mass index is 31.71 kg/m².    Intake/Output - Last 3 Shifts         07/08 0700  07/09 0659 07/09 0700  07/10 0659 07/10 0700  07/11 0659    P.O. 1130      Total Intake(mL/kg) 1130 (10.7)      Urine (mL/kg/hr) 725 (0.3)      Total Output 725      Net +405             Urine Occurrence 1 x 2 x 3 x            Physical Exam  Abdominal:      General: There is no distension.      Palpations: Abdomen is soft.      Tenderness: There is no abdominal tenderness.       Significant Labs:  I have reviewed all pertinent lab results within the past 24 hours.  CBC:   Recent Labs   Lab 07/10/22  0512   WBC 4.43   RBC 3.85*   HGB 12.0*   HCT 36.1*   PLT 99*   MCV 94   MCH 31.2*   MCHC 33.2     CMP:   Recent Labs   Lab 07/10/22  0512      CALCIUM 9.2   ALBUMIN 4.1   PROT 7.0      K 3.7   CO2 29   CL 99   BUN 14   CREATININE 0.8   ALKPHOS 89   ALT 76*   AST 53*   BILITOT 3.1*       Significant Diagnostics:  I have reviewed all pertinent imaging results/findings within the past 24 hours.    Assessment/Plan:     * Calculus of bile duct without cholecystitis with obstruction  Bili dropping indicating passage of stone/s  Monitor INR- would like less than 1.5 for surgery  Plan for surgery Monday- lap latonia  Diet as tolerated until mn mon    - may need ercp if bili continues to rise Monday-         Santa Cardona MD  General Surgery  [facility]

## 2022-07-10 NOTE — ASSESSMENT & PLAN NOTE
Bili dropping indicating passage of stone/s  Monitor INR- would like less than 1.5 for surgery  Plan for surgery Monday- lap latonia  Diet as tolerated until mn mon    - may need ercp if bili continues to rise Monday-

## 2022-07-10 NOTE — PROGRESS NOTES
[facility] Medicine Progress Note  Patient Name: Kennedy Thomas MRN: [id number]   Patient Class: OP- Observation  Length of Stay: 0   Admission Date: 7/7/2022  8:51 PM Attending Physician: Jaja Berg MD   Primary Care Provider: Fernando Eagle MD Face-to-Face encounter date: 07/10/2022   Chief Complaint: Flank Pain (Right lower back pain and right flank pain starting today, denies n/v/d)      Subjective:    Interval History   Patient is doing well.   Eating ok. No issues.    Denies chest pain, shortness of breath, palpitations, abdominal pain, nausea/vomiting.   No concerns/issues overnight reported by the patient or the nursing staff.  Reviewed the labs and discussed the plan of care.   No family present at bedside.     Review of Systems   All other Review of Systems were found to be negative expect for that mentioned already in HPI.     Objective:   Physical Exam  BP (!) 160/86   Pulse 74   Temp 98.2 °F (36.8 °C) (Oral)   Resp 18   Ht 6' (1.829 m)   Wt 106.1 kg (233 lb 12.8 oz)   SpO2 95%   BMI 31.71 kg/m²   Constitutional: No distress.   HENT: Atraumatic.   Cardiovascular: Normal rate, regular rhythm and normal heart sounds.   Pulmonary/Chest: Effort normal. Clear to auscultation bilaterally. No wheezes.   Abdominal: Soft. Bowel sounds are normal. Exhibits no distension and no mass. No tenderness  Neurological: Alert.   Skin: Skin is warm and dry.     Labs and Imaging    Significant Labs: All pertinent labs within the past 24 hours have been reviewed.    Significant Imaging: I have reviewed all pertinent imaging results/findings within the past 24 hours.    I have reviewed the Vitals, labs and imaging as above.     Assessment & Plan:   Kennedy Thomas is a 81 y.o. male admitted for    Calculus of bile duct without cholecystitis with obstruction - improving bilirubin, INR below 2, Surgery planned Monday, bilirubin trended up. Continue to monitor    Core measures:  - Code  status: full code    - Diet: Regular  VTE Risk Mitigation (From admission, onward)         Ordered     IP VTE HIGH RISK PATIENT  Once         07/08/22 0346     Place sequential compression device  Until discontinued         07/08/22 0346     Reason for No Pharmacological VTE Prophylaxis  Once        Question:  Reasons:  Answer:  Already adequately anticoagulated on oral Anticoagulants    07/08/22 0346                Discharge Planning:   Discharge Planning   NANCIE: 07/11/2022    Code Status: Full Code   Is the patient medically ready for discharge?: No    Reason for patient still in hospital (select all that apply): Patient trending condition  Discharge Plan A: Home with assistance from family        Above encounter included review of the medical records, interviewing and examining the patient face-to-face, discussion with family and other health care providers, ordering and interpreting lab/test results and formulating a plan of care.     Medical Decision Making:  [] Low Complexity  [x] Moderate Complexity  [] High Complexity    Jaja Berg MD  [facility] Hospitalist  07/10/2022

## 2022-07-11 ENCOUNTER — ANESTHESIA (OUTPATIENT)
Dept: SURGERY | Facility: HOSPITAL | Age: 82
DRG: 419 | End: 2022-07-11
Payer: MEDICARE

## 2022-07-11 ENCOUNTER — ANESTHESIA EVENT (OUTPATIENT)
Dept: SURGERY | Facility: HOSPITAL | Age: 82
DRG: 419 | End: 2022-07-11
Payer: MEDICARE

## 2022-07-11 LAB
ALBUMIN SERPL BCP-MCNC: 4.3 G/DL (ref 3.5–5.2)
ALP SERPL-CCNC: 92 U/L (ref 55–135)
ALT SERPL W/O P-5'-P-CCNC: 58 U/L (ref 10–44)
ANION GAP SERPL CALC-SCNC: 6 MMOL/L (ref 8–16)
AST SERPL-CCNC: 38 U/L (ref 10–40)
BASOPHILS # BLD AUTO: 0.05 K/UL (ref 0–0.2)
BASOPHILS NFR BLD: 1 % (ref 0–1.9)
BILIRUB SERPL-MCNC: 2.3 MG/DL (ref 0.1–1)
BUN SERPL-MCNC: 13 MG/DL (ref 8–23)
CALCIUM SERPL-MCNC: 9.2 MG/DL (ref 8.7–10.5)
CHLORIDE SERPL-SCNC: 100 MMOL/L (ref 95–110)
CO2 SERPL-SCNC: 32 MMOL/L (ref 23–29)
CREAT SERPL-MCNC: 0.8 MG/DL (ref 0.5–1.4)
DIFFERENTIAL METHOD: ABNORMAL
EOSINOPHIL # BLD AUTO: 0.2 K/UL (ref 0–0.5)
EOSINOPHIL NFR BLD: 3.2 % (ref 0–8)
ERYTHROCYTE [DISTWIDTH] IN BLOOD BY AUTOMATED COUNT: 13.3 % (ref 11.5–14.5)
EST. GFR  (AFRICAN AMERICAN): >60 ML/MIN/1.73 M^2
EST. GFR  (NON AFRICAN AMERICAN): >60 ML/MIN/1.73 M^2
GLUCOSE SERPL-MCNC: 119 MG/DL (ref 70–110)
GLUCOSE SERPL-MCNC: 172 MG/DL (ref 70–110)
HCT VFR BLD AUTO: 35.3 % (ref 40–54)
HGB BLD-MCNC: 11.9 G/DL (ref 14–18)
IMM GRANULOCYTES # BLD AUTO: 0 K/UL (ref 0–0.04)
IMM GRANULOCYTES NFR BLD AUTO: 0 % (ref 0–0.5)
INR PPP: 1.5
INR PPP: 1.5
LYMPHOCYTES # BLD AUTO: 1 K/UL (ref 1–4.8)
LYMPHOCYTES NFR BLD: 20.1 % (ref 18–48)
MCH RBC QN AUTO: 31.6 PG (ref 27–31)
MCHC RBC AUTO-ENTMCNC: 33.7 G/DL (ref 32–36)
MCV RBC AUTO: 94 FL (ref 82–98)
MONOCYTES # BLD AUTO: 0.6 K/UL (ref 0.3–1)
MONOCYTES NFR BLD: 11.4 % (ref 4–15)
NEUTROPHILS # BLD AUTO: 3.2 K/UL (ref 1.8–7.7)
NEUTROPHILS NFR BLD: 64.3 % (ref 38–73)
NRBC BLD-RTO: 0 /100 WBC
PLATELET # BLD AUTO: 106 K/UL (ref 150–450)
PMV BLD AUTO: 10.7 FL (ref 9.2–12.9)
POTASSIUM SERPL-SCNC: 3.8 MMOL/L (ref 3.5–5.1)
PROT SERPL-MCNC: 7.2 G/DL (ref 6–8.4)
PROTHROMBIN TIME: 16.8 SEC (ref 11.4–13.7)
PROTHROMBIN TIME: 16.8 SEC (ref 11.4–13.7)
RBC # BLD AUTO: 3.77 M/UL (ref 4.6–6.2)
SODIUM SERPL-SCNC: 138 MMOL/L (ref 136–145)
WBC # BLD AUTO: 4.93 K/UL (ref 3.9–12.7)

## 2022-07-11 PROCEDURE — 47563 PR LAP,CHOLECYSTECTOMY/GRAPH: ICD-10-PCS | Mod: ,,, | Performed by: SURGERY

## 2022-07-11 PROCEDURE — 25500020 PHARM REV CODE 255: Performed by: SURGERY

## 2022-07-11 PROCEDURE — 27000284 HC CANNULA NASAL: Performed by: ANESTHESIOLOGY

## 2022-07-11 PROCEDURE — 27000080 OPTIME MED/SURG SUP & DEVICES GENERAL CLASSIFICATION: Performed by: SURGERY

## 2022-07-11 PROCEDURE — 85610 PROTHROMBIN TIME: CPT | Performed by: INTERNAL MEDICINE

## 2022-07-11 PROCEDURE — 37000008 HC ANESTHESIA 1ST 15 MINUTES: Performed by: SURGERY

## 2022-07-11 PROCEDURE — 25000003 PHARM REV CODE 250: Performed by: NURSE ANESTHETIST, CERTIFIED REGISTERED

## 2022-07-11 PROCEDURE — 99900035 HC TECH TIME PER 15 MIN (STAT)

## 2022-07-11 PROCEDURE — 71000039 HC RECOVERY, EACH ADD'L HOUR: Performed by: SURGERY

## 2022-07-11 PROCEDURE — 27201423 OPTIME MED/SURG SUP & DEVICES STERILE SUPPLY: Performed by: SURGERY

## 2022-07-11 PROCEDURE — 82962 GLUCOSE BLOOD TEST: CPT | Performed by: SURGERY

## 2022-07-11 PROCEDURE — 85025 COMPLETE CBC W/AUTO DIFF WBC: CPT | Performed by: INTERNAL MEDICINE

## 2022-07-11 PROCEDURE — 36000709 HC OR TIME LEV III EA ADD 15 MIN: Performed by: SURGERY

## 2022-07-11 PROCEDURE — 36000708 HC OR TIME LEV III 1ST 15 MIN: Performed by: SURGERY

## 2022-07-11 PROCEDURE — 71000033 HC RECOVERY, INTIAL HOUR: Performed by: SURGERY

## 2022-07-11 PROCEDURE — 27000653 HC CATH, IV CATHLN: Performed by: ANESTHESIOLOGY

## 2022-07-11 PROCEDURE — 36415 COLL VENOUS BLD VENIPUNCTURE: CPT | Performed by: INTERNAL MEDICINE

## 2022-07-11 PROCEDURE — 27000221 HC OXYGEN, UP TO 24 HOURS

## 2022-07-11 PROCEDURE — 25000003 PHARM REV CODE 250: Performed by: SURGERY

## 2022-07-11 PROCEDURE — 12000002 HC ACUTE/MED SURGE SEMI-PRIVATE ROOM

## 2022-07-11 PROCEDURE — 63600175 PHARM REV CODE 636 W HCPCS: Performed by: ANESTHESIOLOGY

## 2022-07-11 PROCEDURE — 47563 LAPARO CHOLECYSTECTOMY/GRAPH: CPT | Mod: ,,, | Performed by: SURGERY

## 2022-07-11 PROCEDURE — 37000009 HC ANESTHESIA EA ADD 15 MINS: Performed by: SURGERY

## 2022-07-11 PROCEDURE — 25000003 PHARM REV CODE 250: Performed by: ANESTHESIOLOGY

## 2022-07-11 PROCEDURE — 63600175 PHARM REV CODE 636 W HCPCS: Performed by: NURSE ANESTHETIST, CERTIFIED REGISTERED

## 2022-07-11 PROCEDURE — 80053 COMPREHEN METABOLIC PANEL: CPT | Performed by: INTERNAL MEDICINE

## 2022-07-11 PROCEDURE — 94761 N-INVAS EAR/PLS OXIMETRY MLT: CPT

## 2022-07-11 PROCEDURE — 74300 PR  X-RAY OPER CHOLANGIOGRAM: ICD-10-PCS | Mod: 26,,, | Performed by: SURGERY

## 2022-07-11 PROCEDURE — 88304 TISSUE EXAM BY PATHOLOGIST: CPT | Mod: TC,59

## 2022-07-11 PROCEDURE — 74300 X-RAY BILE DUCTS/PANCREAS: CPT | Mod: 26,,, | Performed by: SURGERY

## 2022-07-11 RX ORDER — HYDROMORPHONE HYDROCHLORIDE 1 MG/ML
0.2 INJECTION, SOLUTION INTRAMUSCULAR; INTRAVENOUS; SUBCUTANEOUS EVERY 5 MIN PRN
Status: DISCONTINUED | OUTPATIENT
Start: 2022-07-11 | End: 2022-07-11 | Stop reason: HOSPADM

## 2022-07-11 RX ORDER — ONDANSETRON 2 MG/ML
4 INJECTION INTRAMUSCULAR; INTRAVENOUS DAILY PRN
Status: DISCONTINUED | OUTPATIENT
Start: 2022-07-11 | End: 2022-07-11 | Stop reason: HOSPADM

## 2022-07-11 RX ORDER — SODIUM CHLORIDE 0.9 % (FLUSH) 0.9 %
10 SYRINGE (ML) INJECTION
Status: DISCONTINUED | OUTPATIENT
Start: 2022-07-11 | End: 2022-07-11 | Stop reason: HOSPADM

## 2022-07-11 RX ORDER — SODIUM CHLORIDE 0.9 G/100ML
IRRIGANT IRRIGATION
Status: DISCONTINUED | OUTPATIENT
Start: 2022-07-11 | End: 2022-07-11 | Stop reason: HOSPADM

## 2022-07-11 RX ORDER — MEPERIDINE HYDROCHLORIDE 50 MG/ML
12.5 INJECTION INTRAMUSCULAR; INTRAVENOUS; SUBCUTANEOUS EVERY 10 MIN PRN
Status: DISCONTINUED | OUTPATIENT
Start: 2022-07-11 | End: 2022-07-11 | Stop reason: HOSPADM

## 2022-07-11 RX ORDER — BUPIVACAINE HYDROCHLORIDE AND EPINEPHRINE 5; 5 MG/ML; UG/ML
INJECTION, SOLUTION EPIDURAL; INTRACAUDAL; PERINEURAL
Status: DISCONTINUED | OUTPATIENT
Start: 2022-07-11 | End: 2022-07-11 | Stop reason: HOSPADM

## 2022-07-11 RX ORDER — FENTANYL CITRATE 50 UG/ML
INJECTION, SOLUTION INTRAMUSCULAR; INTRAVENOUS
Status: DISCONTINUED | OUTPATIENT
Start: 2022-07-11 | End: 2022-07-11

## 2022-07-11 RX ORDER — FAMOTIDINE 10 MG/ML
INJECTION INTRAVENOUS
Status: DISCONTINUED | OUTPATIENT
Start: 2022-07-11 | End: 2022-07-11

## 2022-07-11 RX ORDER — HYDROMORPHONE HYDROCHLORIDE 1 MG/ML
1 INJECTION, SOLUTION INTRAMUSCULAR; INTRAVENOUS; SUBCUTANEOUS EVERY 6 HOURS PRN
Status: DISCONTINUED | OUTPATIENT
Start: 2022-07-11 | End: 2022-07-13 | Stop reason: HOSPADM

## 2022-07-11 RX ORDER — GLUCAGON 1 MG
KIT INJECTION
Status: DISCONTINUED | OUTPATIENT
Start: 2022-07-11 | End: 2022-07-11

## 2022-07-11 RX ORDER — PHENYLEPHRINE HYDROCHLORIDE 10 MG/ML
INJECTION INTRAVENOUS
Status: DISCONTINUED | OUTPATIENT
Start: 2022-07-11 | End: 2022-07-11

## 2022-07-11 RX ORDER — LIDOCAINE HYDROCHLORIDE 20 MG/ML
JELLY TOPICAL
Status: DISCONTINUED | OUTPATIENT
Start: 2022-07-11 | End: 2022-07-11

## 2022-07-11 RX ORDER — OXYCODONE HYDROCHLORIDE 5 MG/1
10 TABLET ORAL EVERY 4 HOURS PRN
Status: DISCONTINUED | OUTPATIENT
Start: 2022-07-11 | End: 2022-07-13 | Stop reason: HOSPADM

## 2022-07-11 RX ORDER — SODIUM CHLORIDE, SODIUM LACTATE, POTASSIUM CHLORIDE, CALCIUM CHLORIDE 600; 310; 30; 20 MG/100ML; MG/100ML; MG/100ML; MG/100ML
INJECTION, SOLUTION INTRAVENOUS CONTINUOUS PRN
Status: DISCONTINUED | OUTPATIENT
Start: 2022-07-11 | End: 2022-07-11

## 2022-07-11 RX ORDER — CEFAZOLIN SODIUM 1 G/3ML
INJECTION, POWDER, FOR SOLUTION INTRAMUSCULAR; INTRAVENOUS
Status: DISCONTINUED | OUTPATIENT
Start: 2022-07-11 | End: 2022-07-11

## 2022-07-11 RX ORDER — DEXAMETHASONE SODIUM PHOSPHATE 4 MG/ML
INJECTION, SOLUTION INTRA-ARTICULAR; INTRALESIONAL; INTRAMUSCULAR; INTRAVENOUS; SOFT TISSUE
Status: DISCONTINUED | OUTPATIENT
Start: 2022-07-11 | End: 2022-07-11

## 2022-07-11 RX ORDER — OXYCODONE HYDROCHLORIDE 5 MG/1
5 TABLET ORAL
Status: DISCONTINUED | OUTPATIENT
Start: 2022-07-11 | End: 2022-07-11 | Stop reason: HOSPADM

## 2022-07-11 RX ORDER — GLUCAGON 1 MG
2 KIT INJECTION ONCE
Status: DISCONTINUED | OUTPATIENT
Start: 2022-07-11 | End: 2022-07-11 | Stop reason: HOSPADM

## 2022-07-11 RX ORDER — ONDANSETRON 2 MG/ML
8 INJECTION INTRAMUSCULAR; INTRAVENOUS EVERY 6 HOURS PRN
Status: DISCONTINUED | OUTPATIENT
Start: 2022-07-11 | End: 2022-07-13 | Stop reason: HOSPADM

## 2022-07-11 RX ORDER — DIPHENHYDRAMINE HYDROCHLORIDE 50 MG/ML
12.5 INJECTION INTRAMUSCULAR; INTRAVENOUS
Status: DISCONTINUED | OUTPATIENT
Start: 2022-07-11 | End: 2022-07-11 | Stop reason: HOSPADM

## 2022-07-11 RX ORDER — LIDOCAINE HYDROCHLORIDE 20 MG/ML
INJECTION, SOLUTION EPIDURAL; INFILTRATION; INTRACAUDAL; PERINEURAL
Status: DISCONTINUED | OUTPATIENT
Start: 2022-07-11 | End: 2022-07-11

## 2022-07-11 RX ORDER — SUCCINYLCHOLINE CHLORIDE 20 MG/ML
INJECTION INTRAMUSCULAR; INTRAVENOUS
Status: DISCONTINUED | OUTPATIENT
Start: 2022-07-11 | End: 2022-07-11

## 2022-07-11 RX ORDER — PROPOFOL 10 MG/ML
VIAL (ML) INTRAVENOUS
Status: DISCONTINUED | OUTPATIENT
Start: 2022-07-11 | End: 2022-07-11

## 2022-07-11 RX ORDER — ONDANSETRON 2 MG/ML
INJECTION INTRAMUSCULAR; INTRAVENOUS
Status: DISCONTINUED | OUTPATIENT
Start: 2022-07-11 | End: 2022-07-11

## 2022-07-11 RX ORDER — ROCURONIUM BROMIDE 10 MG/ML
INJECTION, SOLUTION INTRAVENOUS
Status: DISCONTINUED | OUTPATIENT
Start: 2022-07-11 | End: 2022-07-11

## 2022-07-11 RX ADMIN — GLUCAGON 1 MG: KIT at 12:07

## 2022-07-11 RX ADMIN — ONDANSETRON 4 MG: 2 INJECTION INTRAMUSCULAR; INTRAVENOUS at 11:07

## 2022-07-11 RX ADMIN — HYDROMORPHONE HYDROCHLORIDE 0.2 MG: 1 INJECTION, SOLUTION INTRAMUSCULAR; INTRAVENOUS; SUBCUTANEOUS at 01:07

## 2022-07-11 RX ADMIN — SUCCINYLCHOLINE CHLORIDE 140 MG: 20 INJECTION, SOLUTION INTRAMUSCULAR; INTRAVENOUS at 11:07

## 2022-07-11 RX ADMIN — PHENYLEPHRINE HYDROCHLORIDE 100 MCG: 10 INJECTION INTRAVENOUS at 12:07

## 2022-07-11 RX ADMIN — PROPOFOL 100 MG: 10 INJECTION, EMULSION INTRAVENOUS at 11:07

## 2022-07-11 RX ADMIN — ROCURONIUM BROMIDE 10 MG: 10 INJECTION, SOLUTION INTRAVENOUS at 12:07

## 2022-07-11 RX ADMIN — SODIUM CHLORIDE, SODIUM LACTATE, POTASSIUM CHLORIDE, AND CALCIUM CHLORIDE: .6; .31; .03; .02 INJECTION, SOLUTION INTRAVENOUS at 12:07

## 2022-07-11 RX ADMIN — OXYCODONE HYDROCHLORIDE 5 MG: 5 TABLET ORAL at 01:07

## 2022-07-11 RX ADMIN — FAMOTIDINE 20 MG: 10 INJECTION, SOLUTION INTRAVENOUS at 11:07

## 2022-07-11 RX ADMIN — LIDOCAINE HYDROCHLORIDE 4 ML: 20 JELLY TOPICAL at 11:07

## 2022-07-11 RX ADMIN — SODIUM CHLORIDE, SODIUM LACTATE, POTASSIUM CHLORIDE, AND CALCIUM CHLORIDE: .6; .31; .03; .02 INJECTION, SOLUTION INTRAVENOUS at 10:07

## 2022-07-11 RX ADMIN — METOPROLOL SUCCINATE 25 MG: 25 TABLET, EXTENDED RELEASE ORAL at 08:07

## 2022-07-11 RX ADMIN — DEXAMETHASONE SODIUM PHOSPHATE 4 MG: 4 INJECTION, SOLUTION INTRAMUSCULAR; INTRAVENOUS at 11:07

## 2022-07-11 RX ADMIN — SUGAMMADEX 200 MG: 100 INJECTION, SOLUTION INTRAVENOUS at 12:07

## 2022-07-11 RX ADMIN — PHENYLEPHRINE HYDROCHLORIDE 100 MCG: 10 INJECTION INTRAVENOUS at 11:07

## 2022-07-11 RX ADMIN — OXYCODONE HYDROCHLORIDE 10 MG: 5 TABLET ORAL at 06:07

## 2022-07-11 RX ADMIN — FENTANYL CITRATE 50 MCG: 50 INJECTION INTRAMUSCULAR; INTRAVENOUS at 11:07

## 2022-07-11 RX ADMIN — LIDOCAINE HYDROCHLORIDE 60 MG: 20 INJECTION, SOLUTION EPIDURAL; INFILTRATION; INTRACAUDAL; PERINEURAL at 11:07

## 2022-07-11 RX ADMIN — CEFAZOLIN 2 G: 330 INJECTION, POWDER, FOR SOLUTION INTRAMUSCULAR; INTRAVENOUS at 11:07

## 2022-07-11 RX ADMIN — ROCURONIUM BROMIDE 30 MG: 10 INJECTION, SOLUTION INTRAVENOUS at 11:07

## 2022-07-11 NOTE — OP NOTE
[facility]  Cholecystectomy with IOC  Procedure Note    SUMMARY     Date of Procedure: 7/11/2022     Procedure: 82 y/o with choledocholithiasis    Provider: Santa Cardona MD    Assisting Provider: none    Indications: This patient presents with symptomatic gallbladder disease and will undergo laparoscopic cholecystectomy.    Pre-Operative Diagnosis: Calculus of gallbladder with other cholecystitis and obstruction    Post-Operative Diagnosis: Same    Anesthesia: GETA    Technical Procedures Used: lap with ioc     Description of the Findings of the Procedure:     The patient was seen again in the Holding Room. The risks, benefits, complications, treatment options, and expected outcomes were discussed with the patient. The possibilities of reaction to medication, pulmonary aspiration, perforation of viscus, bleeding, recurrent infection, finding a normal gallbladder, the need for additional procedures, failure to diagnose a condition, the possible need to convert to an open procedure, and creating a complication requiring transfusion or operation were discussed with the patient. The patient and/or family concurred with the proposed plan, giving informed consent. The site of surgery properly noted/marked. The patient was taken to Operating Room, identified as Kennedy Thomas and the procedure verified as Laparoscopic Cholecystectomy with Intraoperative Cholangiograms. A Time Out was held and the above information confirmed.    Prior to the induction of general anesthesia, antibiotic prophylaxis was administered. General endotracheal anesthesia was then administered and tolerated well. After the induction, the abdomen was prepped in the usual sterile fashion. The patient was positioned in the supine position with the left arm comfortably tucked, along with some reverse Trendelenburg.    Local anesthetic agent was injected into the skin near the umbilicus and an incision made.  An umbilical hernia was  identified.  The sac was freed and hernia was reduced. The hernia was mainly omental.  This was used as a trocar site. A 10 mm port under direct vision.  Pneumoperitoneum was then created with CO2 and tolerated well without any adverse changes in the patient's vital signs. Additional trocars were introduced under direct vision. All skin incisions were infiltrated with a local anesthetic agent before making the incision and placing the trocars.     The gallbladder was identified, the fundus grasped and retracted cephalad. Adhesions were lysed bluntly and with the electrocautery where indicated, taking care not to injure any adjacent organs or viscus. The infundibulum was grasped and retracted laterally, exposing the peritoneum overlying the triangle of Calot. This was then divided and exposed in a blunt fashion. The cystic duct was clearly identified and bluntly dissected circumferentially. The junctions of the gallbladder, cystic duct and common bile duct were clearly identified prior to the division of any linear structure and photo documented.     An incision was made in the cystic duct and the cholangiogram catheter introduced. The catheter was secured using an endoclip. The study showed good visualization of the distal and proximal biliary tree but very little emptying into the small bowel.  Some stones were seen in the cbd.  Glucagon was used to help flush the duct but very little drainage of cbd seen . The catheter was then removed.     The cystic duct was then doubly ligated with surgical clipson the patient side and singly clipped on the gallbladder side and divided. The cystic artery was identified, dissected free, ligated with clips and divided as well.     The gallbladder was dissected from the liver bed in retrograde fashion with the electrocautery. The gallbladder was removed. The liver bed was irrigated and inspected. Hemostasis was achieved with the electrocautery. Copious irrigation was utilized and  was repeatedly aspirated until clear all particulate matter.    Pneumoperitoneum was completely reduced after viewing removal of the trocars under direct vision. The wound was thoroughly irrigated and the fascia was then closed with multiple vicryl sutures; the skin was then closed with monocryl and a sterile dressing was applied.    Instrument, sponge, and needle counts were correct at closure and at the conclusion of the case.     Cholecystitis with Cholelithiasis;  Stones in cbd           Significant Surgical Tasks Conducted by the Assistant(s), if Applicable: none    Complications: None; patient tolerated the procedure well.    Total IV Fluids: see anesthesia  Estimated Blood Loss (EBL): less than 50 mL     Drains: none           Implants: none    Specimens: Gallbladder; hernia sack           Condition: stable    Disposition: PACU - hemodynamically stable.    Attestation: I was present and scrubbed for the entire procedure.

## 2022-07-11 NOTE — CARE UPDATE
07/11/22 0806   PRE-TX-O2   O2 Device (Oxygen Therapy) room air   $ Is the patient on Low Flow Oxygen? Yes   SpO2 95 %   Pulse Oximetry Type Intermittent   $ Pulse Oximetry - Multiple Charge Pulse Oximetry - Multiple   Pulse 62   Resp 18   Respiratory Evaluation   $ Care Plan Tech Time 15 min

## 2022-07-11 NOTE — PLAN OF CARE
Pacemaker interrogated. Shirley Reddy from [employer]now [employer] called stated pacemaker functioning appropriately. No issues noted.

## 2022-07-11 NOTE — ANESTHESIA PROCEDURE NOTES
Intubation    Date/Time: 7/11/2022 11:15 AM  Performed by: Shannan Mares CRNA  Authorized by: Rodrick Holloway MD     Intubation:     Induction:  Intravenous    Intubated:  Postinduction    Attempts:  1    Attempted By:  Staff anesthesiologist    Method of Intubation:  Direct    Blade:  Summers 3    Laryngeal View Grade: Grade I - full view of cords      Difficult Airway Encountered?: No      Complications:  None    Airway Device:  Oral endotracheal tube    Airway Device Size:  7.5    Style/Cuff Inflation:  Cuffed (inflated to minimal occlusive pressure)    Inflation Amount (mL):  8    Tube secured:  23    Secured at:  The lips    Placement Verified By:  Capnometry    Complicating Factors:  None    Findings Post-Intubation:  BS equal bilateral and atraumatic/condition of teeth unchanged

## 2022-07-11 NOTE — ANESTHESIA POSTPROCEDURE EVALUATION
Anesthesia Post Evaluation    Patient: Kennedy Thomas    Procedure(s) Performed: Procedure(s) (LRB):  CHOLECYSTECTOMY, LAPAROSCOPIC, WITH CHOLANGIOGRAM (N/A)  REPAIR, HERNIA, UMBILICAL, AGE 5 YEARS OR OLDER    Final Anesthesia Type: general      Patient location during evaluation: PACU  Patient participation: Yes- Able to Participate  Level of consciousness: awake and alert  Post-procedure vital signs: reviewed and stable  Pain management: adequate  Airway patency: patent    PONV status at discharge: No PONV  Anesthetic complications: no      Cardiovascular status: blood pressure returned to baseline and stable  Respiratory status: unassisted and room air  Hydration status: euvolemic  Follow-up not needed.          Vitals Value Taken Time   /66 07/11/22 1330   Temp 36.4 °C (97.6 °F) 07/11/22 1315   Pulse 61 07/11/22 1330   Resp 23 07/11/22 1330   SpO2 96 % 07/11/22 1330   Vitals shown include unvalidated device data.      No case tracking events are documented in the log.      Pain/Dara Score: Pain Rating Prior to Med Admin: 5 (7/11/2022  1:13 PM)  Dara Score: 10 (7/11/2022  1:15 PM)

## 2022-07-11 NOTE — PROGRESS NOTES
[facility] Medicine Progress Note  Patient Name: Kennedy Thomas MRN: [id number]   Patient Class: IP- Inpatient  Length of Stay: 0   Admission Date: 7/7/2022  8:51 PM Attending Physician: Jaja Berg MD   Primary Care Provider: Fernando Eagle MD Face-to-Face encounter date: 07/11/2022   Chief Complaint: Flank Pain (Right lower back pain and right flank pain starting today, denies n/v/d)      Subjective:    Interval History   Patient is doing well.   Had Lapchole with IOC this morning showing slow drainage.   Denies chest pain, shortness of breath, palpitations, abdominal pain, nausea/vomiting.   No concerns/issues overnight reported by the patient or the nursing staff.  Reviewed the labs and discussed the plan of care.   No family present at bedside.     Review of Systems   All other Review of Systems were found to be negative expect for that mentioned already in HPI.     Objective:   Physical Exam  BP (!) 155/79   Pulse 61   Temp 97.3 °F (36.3 °C) (Oral)   Resp 18   Ht 6' (1.829 m)   Wt 106.1 kg (233 lb 12.8 oz)   SpO2 98%   BMI 31.71 kg/m²   Constitutional: No distress.   HENT: Atraumatic.   Cardiovascular: Normal rate, regular rhythm and normal heart sounds.   Pulmonary/Chest: Effort normal. Clear to auscultation bilaterally. No wheezes.   Abdominal: Soft. Bowel sounds are normal. Exhibits no distension and no mass. No tenderness  Neurological: Alert.   Skin: Skin is warm and dry.     Labs and Imaging    Significant Labs: All pertinent labs within the past 24 hours have been reviewed.    Significant Imaging: I have reviewed all pertinent imaging results/findings within the past 24 hours.    I have reviewed the Vitals, labs and imaging as above.     Assessment & Plan:   Kennedy Thomas is a 81 y.o. male admitted for    Calculus of bile duct without cholecystitis with obstruction - s/p lap latonia, plan of ERCP tomorrow, resume soft diet, npo midnight    Core measures:  - Code  status: full code    - Diet: Regular  VTE Risk Mitigation (From admission, onward)         Ordered     IP VTE HIGH RISK PATIENT  Once         07/08/22 0346     Place sequential compression device  Until discontinued         07/08/22 0346     Reason for No Pharmacological VTE Prophylaxis  Once        Question:  Reasons:  Answer:  Already adequately anticoagulated on oral Anticoagulants    07/08/22 0346                Discharge Planning:   Discharge Planning   NANCIE: 07/11/2022    Code Status: Full Code   Is the patient medically ready for discharge?: No    Reason for patient still in hospital (select all that apply): Patient trending condition  Discharge Plan A: Home with assistance from family        Above encounter included review of the medical records, interviewing and examining the patient face-to-face, discussion with family and other health care providers, ordering and interpreting lab/test results and formulating a plan of care.     Medical Decision Making:  [] Low Complexity  [x] Moderate Complexity  [] High Complexity    Jaja Berg MD  [facility] Hospitalist  07/11/2022

## 2022-07-11 NOTE — ANESTHESIA PREPROCEDURE EVALUATION
07/11/2022  Kennedy Thomas is a 81 y.o., male.      Patient Active Problem List   Diagnosis    Pancytopenia    Multiple chronic diseases    Calculus of bile duct without cholecystitis with obstruction       Past Surgical History:   Procedure Laterality Date    APPENDECTOMY      CARDIAC PACEMAKER PLACEMENT  2010    EYE SURGERY      INGUINAL HERNIA REPAIR Left         Tobacco Use:  The patient  reports that he has quit smoking. He has never used smokeless tobacco.     Results for orders placed or performed during the hospital encounter of 07/07/22   EKG 12-lead    Collection Time: 07/08/22  3:35 AM    Narrative    Test Reason : R10.13,    Vent. Rate : 068 BPM     Atrial Rate : 068 BPM     P-R Int : 196 ms          QRS Dur : 158 ms      QT Int : 458 ms       P-R-T Axes : 000 -64 143 degrees     QTc Int : 487 ms    AV dual-paced rhythm  Abnormal ECG  When compared with ECG of 07-APR-2022 16:25,  Vent. rate has increased BY   5 BPM    Referred By: [email or web address]   SELF           Confirmed By:         Imaging Results          CT Abdomen Pelvis With Contrast (Final result)  Result time 07/08/22 02:01:18    Final result by Milo Hartmann MD (07/08/22 02:01:18)                 Narrative:    EXAM DESCRIPTION:  CT ABDOMEN PELVIS WITH CONTRAST    CLINICAL HISTORY:  81 years Male, Abdominal pain, acute, nonlocalized    TECHNIQUE: Helical CT axial images are obtained from the lung bases to the pubic symphysis with IV contrast. No oral contrast was administered. Multiplanar reconstruction. This exam was performed according to our departmental dose-optimization program, which includes automated exposure control, adjustment of the mA and/or kV according to patient size and/or use of iterative reconstruction technique.    COMPARISON: CT abdomen pelvis 5/23/2022, right upper quadrant ultrasound  7/7/2022      FINDINGS:  LUNG BASES: No basilar consolidation or effusions.    LIVER:  Normal in size. Mild diffuse decreased attenuation. Segment 4A simple cyst measuring 1.2 cm, no further workup is warranted. No suspicious hepatic masses. Subcentimeter periportal lymph nodes, of doubtful clinical significance.    HEPATOBILIARY: Cholelithiasis.  No intra- or extrahepatic ductal dilatation.    SPLEEN: Normal size.    PANCREAS: Mild fatty atrophic changes. No focal abnormality.    ADRENAL GLANDS: Normal size. No adrenal masses.    KIDNEYS:  Bilateral kidneys are normal in size without obstructing calculi or hydronephrosis. No nephrolithiasis. Right midpole 2.0 cm simple renal cyst, no further workup is warranted. No focal solid mass.    BOWEL AND MESENTERY: No small or large bowel dilatation. Pandiverticulosis.  The appendix is not identified. No abnormal mesenteric lymphadenopathy.  No pneumoperitoneum. Trace pelvic free fluid.    RETROPERITONEUM: Normal caliber abdominal aorta without aneurysm. Moderate ASVD.  No abnormal retroperitoneal lymphadenopathy.    PELVIS:  Mild prostatomegaly impressing upon the base of the urinary bladder. Otherwise suboptimally distended urinary bladder.    ABDOMINAL WALL: The abdominal wall is intact.    BONES: No suspicious osseous lytic or blastic lesions seen.      IMPRESSION:  1.  No acute intra-abdominal or pelvic disease.  2.  Trace pelvic free fluid, nonspecific.  3.  Cholelithiasis.  4.  Pandiverticulosis without diverticulitis.  5.  Mild prostatomegaly.    Electronically signed by:  Milo Hartmann MD  7/8/2022 2:01 AM CDT Workstation: [id number]                             US Abdomen Limited (Final result)  Result time 07/08/22 00:00:42    Final result by Esteban Barcenas DO (07/08/22 00:00:42)                 Narrative:    CLINICAL HISTORY: Generalized abdominal pain    COMPARISON: CT abdomen pelvis 5/23/2022    TECHNIQUE: Routine transabdominal ultrasonography was  performed with attention to the right upper quadrant.    FINDINGS:  Portions of the liver and gallbladder are obscured by shadowing from overlying bowel gas, limiting evaluation. The visualized liver parenchyma is homogeneous in echotexture. Normal flow is seen in the main portal vein.    There is no intra- or extrahepatic biliary dilatation. The common bile duct measures 5    mm in diameter. There are echogenic shadowing and nonshadowing material in the gallbladder lumen suggestive of biliary sludge and cholelithiasis. No significant gallbladder wall thickening or pericholecystic fluid. The sonographic Padilla's sign is negative.    Limited evaluation of the head and body of the pancreas is unremarkable.    The right kidney measures 11.6 cm and appears unremarkable with no evidence of shadowing renal stone or hydronephrosis. The visualized portions of the IVC and aorta are unremarkable. There is no evidence of ascites.    IMPRESSION:  Biliary sludge and cholelithiasis without sonographic evidence of acute cholecystitis.    Electronically signed by:  Esteban Barcenas DO  7/8/2022 12:00 AM CDT Workstation: [id number]                               Lab Results   Component Value Date    WBC 4.93 07/11/2022    HGB 11.9 (L) 07/11/2022    HCT 35.3 (L) 07/11/2022    MCV 94 07/11/2022     (L) 07/11/2022     BMP  Lab Results   Component Value Date     07/11/2022    K 3.8 07/11/2022     07/11/2022    CO2 32 (H) 07/11/2022    BUN 13 07/11/2022    CREATININE 0.8 07/11/2022    CALCIUM 9.2 07/11/2022    ANIONGAP 6 (L) 07/11/2022     (H) 07/11/2022     07/10/2022    GLU 98 07/09/2022       No results found for this or any previous visit.              Pre-op Assessment    I have reviewed the Patient Summary Reports.     I have reviewed the Nursing Notes. I have reviewed the NPO Status.   I have reviewed the Medications.     Review of Systems  Anesthesia Hx:  No problems with previous Anesthesia  Denies  Family Hx of Anesthesia complications.   Denies Personal Hx of Anesthesia complications.   Social:  Former Smoker    Hematology/Oncology:         -- Anemia: -- Thrombocytopenia:    Cardiovascular:   Pacemaker (MEDTRONIC) Hypertension, well controlled Dysrhythmias (hx of A fib, heart block, s/p dual lead pacemaker placement) atrial fibrillation PVD    Pulmonary:   COPD, mild    Renal/:   Chronic Renal Disease (stage 3 CRI), CRI    Hepatic/GI:   Hx pancreatitis   Musculoskeletal:   Arthritis (gouty arthritis)     Endocrine:   Diabetes, well controlled, type 2        Physical Exam  General: Well nourished, Cooperative, Alert and Oriented    Airway:  Mallampati: III / II  Mouth Opening: Normal  TM Distance: Normal  Tongue: Normal  Neck ROM: Normal ROM    Dental:  Intact  Poor dentition.  Multiple missing teeth.  The remaining incisor looks necrotic, but the patient denies any loose teeth.  Chest/Lungs:  Clear to auscultation    Heart:  Rate: Normal  Rhythm: Regular Rhythm  Sounds: Normal    Abdomen:  Normal, Soft, Nontender        Anesthesia Plan  Type of Anesthesia, risks & benefits discussed:    Anesthesia Type: Gen ETT  Intra-op Monitoring Plan: Standard ASA Monitors  Post Op Pain Control Plan: multimodal analgesia and IV/PO Opioids PRN  Induction:  IV  Airway Plan: Video, Post-Induction  Informed Consent: Informed consent signed with the Patient and all parties understand the risks and agree with anesthesia plan.  All questions answered.   ASA Score: 3  Anesthesia Plan Notes: GETA.  Multimodal analgesia with local by surgeon, and decadron 8 mg.  PONV prophylaxis with Pepcid 20 mg IV, and Zofran 4 mg IV.        Ready For Surgery From Anesthesia Perspective.     .

## 2022-07-11 NOTE — TRANSFER OF CARE
Anesthesia Transfer of Care Note    Patient: Kennedy Thomas    Procedure(s) Performed: Procedure(s) (LRB):  CHOLECYSTECTOMY, LAPAROSCOPIC, WITH CHOLANGIOGRAM (N/A)  REPAIR, HERNIA, UMBILICAL, AGE 5 YEARS OR OLDER    Patient location: PACU    Anesthesia Type: general    Transport from OR: Transported from OR on 2-3 L/min O2 by NC with adequate spontaneous ventilation    Post pain: adequate analgesia    Post assessment: no apparent anesthetic complications    Post vital signs: stable    Level of consciousness: awake and alert    Nausea/Vomiting: no nausea/vomiting    Complications: none    Transfer of care protocol was followed      Last vitals:   Visit Vitals  BP (!) 155/79   Pulse 62   Temp 36.5 °C (97.7 °F) (Oral)   Resp 18   Ht 6' (1.829 m)   Wt 106.1 kg (233 lb 12.8 oz)   SpO2 95%   BMI 31.71 kg/m²

## 2022-07-12 ENCOUNTER — ANESTHESIA EVENT (OUTPATIENT)
Dept: SURGERY | Facility: HOSPITAL | Age: 82
DRG: 419 | End: 2022-07-12
Payer: MEDICARE

## 2022-07-12 ENCOUNTER — ANESTHESIA (OUTPATIENT)
Dept: SURGERY | Facility: HOSPITAL | Age: 82
DRG: 419 | End: 2022-07-12
Payer: MEDICARE

## 2022-07-12 LAB
BASOPHILS # BLD AUTO: 0.05 K/UL (ref 0–0.2)
BASOPHILS NFR BLD: 0.8 % (ref 0–1.9)
DIFFERENTIAL METHOD: ABNORMAL
EOSINOPHIL # BLD AUTO: 0 K/UL (ref 0–0.5)
EOSINOPHIL NFR BLD: 0.5 % (ref 0–8)
ERYTHROCYTE [DISTWIDTH] IN BLOOD BY AUTOMATED COUNT: 13.6 % (ref 11.5–14.5)
GLUCOSE SERPL-MCNC: 97 MG/DL (ref 70–110)
HCT VFR BLD AUTO: 32.8 % (ref 40–54)
HGB BLD-MCNC: 11 G/DL (ref 14–18)
IMM GRANULOCYTES # BLD AUTO: 0.01 K/UL (ref 0–0.04)
IMM GRANULOCYTES NFR BLD AUTO: 0.2 % (ref 0–0.5)
INR PPP: 1.6
INR PPP: 1.6
LYMPHOCYTES # BLD AUTO: 1 K/UL (ref 1–4.8)
LYMPHOCYTES NFR BLD: 16.4 % (ref 18–48)
MCH RBC QN AUTO: 31.5 PG (ref 27–31)
MCHC RBC AUTO-ENTMCNC: 33.5 G/DL (ref 32–36)
MCV RBC AUTO: 94 FL (ref 82–98)
MONOCYTES # BLD AUTO: 0.7 K/UL (ref 0.3–1)
MONOCYTES NFR BLD: 11.9 % (ref 4–15)
NEUTROPHILS # BLD AUTO: 4.3 K/UL (ref 1.8–7.7)
NEUTROPHILS NFR BLD: 70.2 % (ref 38–73)
NRBC BLD-RTO: 0 /100 WBC
PLATELET # BLD AUTO: 96 K/UL (ref 150–450)
PMV BLD AUTO: 11.3 FL (ref 9.2–12.9)
PROTHROMBIN TIME: 17.9 SEC (ref 11.4–13.7)
PROTHROMBIN TIME: 17.9 SEC (ref 11.4–13.7)
RBC # BLD AUTO: 3.49 M/UL (ref 4.6–6.2)
WBC # BLD AUTO: 6.11 K/UL (ref 3.9–12.7)

## 2022-07-12 PROCEDURE — 85610 PROTHROMBIN TIME: CPT | Performed by: SURGERY

## 2022-07-12 PROCEDURE — 37000008 HC ANESTHESIA 1ST 15 MINUTES: Performed by: INTERNAL MEDICINE

## 2022-07-12 PROCEDURE — 25000003 PHARM REV CODE 250: Performed by: SURGERY

## 2022-07-12 PROCEDURE — 85025 COMPLETE CBC W/AUTO DIFF WBC: CPT | Performed by: SURGERY

## 2022-07-12 PROCEDURE — 25000003 PHARM REV CODE 250: Performed by: INTERNAL MEDICINE

## 2022-07-12 PROCEDURE — 82962 GLUCOSE BLOOD TEST: CPT | Performed by: INTERNAL MEDICINE

## 2022-07-12 PROCEDURE — C1769 GUIDE WIRE: HCPCS | Performed by: INTERNAL MEDICINE

## 2022-07-12 PROCEDURE — 63600175 PHARM REV CODE 636 W HCPCS: Performed by: NURSE ANESTHETIST, CERTIFIED REGISTERED

## 2022-07-12 PROCEDURE — 43264 ERCP REMOVE DUCT CALCULI: CPT | Performed by: INTERNAL MEDICINE

## 2022-07-12 PROCEDURE — 25500020 PHARM REV CODE 255: Performed by: INTERNAL MEDICINE

## 2022-07-12 PROCEDURE — 12000002 HC ACUTE/MED SURGE SEMI-PRIVATE ROOM

## 2022-07-12 PROCEDURE — 27202125 HC BALLOON, EXTRACTION (ANY): Performed by: INTERNAL MEDICINE

## 2022-07-12 PROCEDURE — 27000221 HC OXYGEN, UP TO 24 HOURS

## 2022-07-12 PROCEDURE — 25000003 PHARM REV CODE 250: Performed by: NURSE ANESTHETIST, CERTIFIED REGISTERED

## 2022-07-12 PROCEDURE — 99900035 HC TECH TIME PER 15 MIN (STAT)

## 2022-07-12 PROCEDURE — 43262 ENDO CHOLANGIOPANCREATOGRAPH: CPT | Performed by: INTERNAL MEDICINE

## 2022-07-12 PROCEDURE — 94761 N-INVAS EAR/PLS OXIMETRY MLT: CPT

## 2022-07-12 PROCEDURE — 36415 COLL VENOUS BLD VENIPUNCTURE: CPT | Performed by: SURGERY

## 2022-07-12 PROCEDURE — 37000009 HC ANESTHESIA EA ADD 15 MINS: Performed by: INTERNAL MEDICINE

## 2022-07-12 RX ORDER — LIDOCAINE HYDROCHLORIDE 20 MG/ML
INJECTION, SOLUTION EPIDURAL; INFILTRATION; INTRACAUDAL; PERINEURAL
Status: DISCONTINUED | OUTPATIENT
Start: 2022-07-12 | End: 2022-07-12

## 2022-07-12 RX ORDER — PROPOFOL 10 MG/ML
VIAL (ML) INTRAVENOUS
Status: DISCONTINUED | OUTPATIENT
Start: 2022-07-12 | End: 2022-07-12

## 2022-07-12 RX ORDER — SUCCINYLCHOLINE CHLORIDE 20 MG/ML
INJECTION INTRAMUSCULAR; INTRAVENOUS
Status: DISCONTINUED | OUTPATIENT
Start: 2022-07-12 | End: 2022-07-12

## 2022-07-12 RX ORDER — FENTANYL CITRATE 50 UG/ML
INJECTION, SOLUTION INTRAMUSCULAR; INTRAVENOUS
Status: DISCONTINUED | OUTPATIENT
Start: 2022-07-12 | End: 2022-07-12

## 2022-07-12 RX ORDER — HYDROMORPHONE HYDROCHLORIDE 1 MG/ML
0.2 INJECTION, SOLUTION INTRAMUSCULAR; INTRAVENOUS; SUBCUTANEOUS EVERY 5 MIN PRN
Status: DISCONTINUED | OUTPATIENT
Start: 2022-07-12 | End: 2022-07-12 | Stop reason: HOSPADM

## 2022-07-12 RX ORDER — SODIUM CHLORIDE 0.9 % (FLUSH) 0.9 %
10 SYRINGE (ML) INJECTION
Status: DISCONTINUED | OUTPATIENT
Start: 2022-07-12 | End: 2022-07-12 | Stop reason: HOSPADM

## 2022-07-12 RX ORDER — FAMOTIDINE 10 MG/ML
INJECTION INTRAVENOUS
Status: DISCONTINUED | OUTPATIENT
Start: 2022-07-12 | End: 2022-07-12

## 2022-07-12 RX ORDER — ONDANSETRON 2 MG/ML
INJECTION INTRAMUSCULAR; INTRAVENOUS
Status: DISCONTINUED | OUTPATIENT
Start: 2022-07-12 | End: 2022-07-12

## 2022-07-12 RX ORDER — MEPERIDINE HYDROCHLORIDE 50 MG/ML
12.5 INJECTION INTRAMUSCULAR; INTRAVENOUS; SUBCUTANEOUS EVERY 10 MIN PRN
Status: DISCONTINUED | OUTPATIENT
Start: 2022-07-12 | End: 2022-07-12 | Stop reason: HOSPADM

## 2022-07-12 RX ORDER — OXYCODONE HYDROCHLORIDE 5 MG/1
5 TABLET ORAL
Status: DISCONTINUED | OUTPATIENT
Start: 2022-07-12 | End: 2022-07-12 | Stop reason: HOSPADM

## 2022-07-12 RX ORDER — ONDANSETRON 2 MG/ML
4 INJECTION INTRAMUSCULAR; INTRAVENOUS DAILY PRN
Status: DISCONTINUED | OUTPATIENT
Start: 2022-07-12 | End: 2022-07-12 | Stop reason: HOSPADM

## 2022-07-12 RX ORDER — INDOMETHACIN 50 MG/1
SUPPOSITORY RECTAL
Status: COMPLETED | OUTPATIENT
Start: 2022-07-12 | End: 2022-07-12

## 2022-07-12 RX ADMIN — LIDOCAINE HYDROCHLORIDE 80 MG: 20 INJECTION, SOLUTION INTRAVENOUS at 01:07

## 2022-07-12 RX ADMIN — OXYCODONE HYDROCHLORIDE 10 MG: 5 TABLET ORAL at 08:07

## 2022-07-12 RX ADMIN — FAMOTIDINE 20 MG: 10 INJECTION, SOLUTION INTRAVENOUS at 01:07

## 2022-07-12 RX ADMIN — INDOMETHACIN 100 MG: 50 SUPPOSITORY RECTAL at 01:07

## 2022-07-12 RX ADMIN — METOPROLOL SUCCINATE 25 MG: 25 TABLET, EXTENDED RELEASE ORAL at 08:07

## 2022-07-12 RX ADMIN — IOHEXOL 8 ML: 240 INJECTION, SOLUTION INTRATHECAL; INTRAVASCULAR; INTRAVENOUS; ORAL at 01:07

## 2022-07-12 RX ADMIN — SUCCINYLCHOLINE CHLORIDE 120 MG: 20 INJECTION, SOLUTION INTRAMUSCULAR; INTRAVENOUS at 01:07

## 2022-07-12 RX ADMIN — ONDANSETRON 4 MG: 2 INJECTION INTRAMUSCULAR; INTRAVENOUS at 01:07

## 2022-07-12 RX ADMIN — FENTANYL CITRATE 100 MCG: 50 INJECTION INTRAMUSCULAR; INTRAVENOUS at 01:07

## 2022-07-12 RX ADMIN — SODIUM CHLORIDE, SODIUM LACTATE, POTASSIUM CHLORIDE, AND CALCIUM CHLORIDE: .6; .31; .03; .02 INJECTION, SOLUTION INTRAVENOUS at 01:07

## 2022-07-12 RX ADMIN — PROPOFOL 120 MG: 10 INJECTION, EMULSION INTRAVENOUS at 01:07

## 2022-07-12 NOTE — ANESTHESIA PROCEDURE NOTES
Intubation    Date/Time: 7/12/2022 1:35 PM  Performed by: Parth Graf CRNA  Authorized by: Wale Ramos MD     Intubation:     Induction:  Intravenous    Intubated:  Postinduction    Mask Ventilation:  Easy mask    Attempts:  1    Attempted By:  CRNA    Method of Intubation:  Video laryngoscopy    Blade:  Summers 3    Laryngeal View Grade: Grade I - full view of cords      Difficult Airway Encountered?: No      Complications:  None    Airway Device:  Oral endotracheal tube    Airway Device Size:  7.5    Style/Cuff Inflation:  Cuffed (inflated to minimal occlusive pressure)    Tube secured:  21    Secured at:  The lips    Placement Verified By:  Capnometry and Revisualization with laryngoscopy    Complicating Factors:  None    Findings Post-Intubation:  BS equal bilateral

## 2022-07-12 NOTE — TRANSFER OF CARE
Anesthesia Transfer of Care Note    Patient: Kennedy Thomas    Procedure(s) Performed: Procedure(s) (LRB):  ERCP (ENDOSCOPIC RETROGRADE CHOLANGIOPANCREATOGRAPHY) (N/A)    Patient location: PACU    Anesthesia Type: general    Transport from OR: Transported from OR on room air with adequate spontaneous ventilation    Post pain: adequate analgesia    Post assessment: no apparent anesthetic complications    Post vital signs: stable    Level of consciousness: awake and alert    Nausea/Vomiting: no nausea/vomiting    Complications: none    Transfer of care protocol was followed      Last vitals:   Visit Vitals  BP (!) 172/87 (BP Location: Left arm, Patient Position: Lying)   Pulse 73   Temp 37.1 °C (98.8 °F) (Oral)   Resp 18   Ht 6' (1.829 m)   Wt 106.1 kg (233 lb 12.8 oz)   SpO2 (!) 93%   BMI 31.71 kg/m²

## 2022-07-12 NOTE — ANESTHESIA PREPROCEDURE EVALUATION
07/12/2022  Kennedy Thomas is a 81 y.o., male.      Patient Active Problem List   Diagnosis    Pancytopenia    Multiple chronic diseases    Calculus of bile duct without cholecystitis with obstruction       Past Surgical History:   Procedure Laterality Date    APPENDECTOMY      CARDIAC PACEMAKER PLACEMENT  2010    EYE SURGERY      INGUINAL HERNIA REPAIR Left         Tobacco Use:  The patient  reports that he has quit smoking. He has never used smokeless tobacco.     Results for orders placed or performed during the hospital encounter of 07/07/22   EKG 12-lead    Collection Time: 07/08/22  3:35 AM    Narrative    Test Reason : R10.13,    Vent. Rate : 068 BPM     Atrial Rate : 068 BPM     P-R Int : 196 ms          QRS Dur : 158 ms      QT Int : 458 ms       P-R-T Axes : 000 -64 143 degrees     QTc Int : 487 ms    AV dual-paced rhythm  Abnormal ECG  When compared with ECG of 07-APR-2022 16:25,  Vent. rate has increased BY   5 BPM    Referred By: [email or web address]   SELF           Confirmed By:              Lab Results   Component Value Date    WBC 6.11 07/12/2022    HGB 11.0 (L) 07/12/2022    HCT 32.8 (L) 07/12/2022    MCV 94 07/12/2022    PLT 96 (L) 07/12/2022     BMP  Lab Results   Component Value Date     07/11/2022    K 3.8 07/11/2022     07/11/2022    CO2 32 (H) 07/11/2022    BUN 13 07/11/2022    CREATININE 0.8 07/11/2022    CALCIUM 9.2 07/11/2022    ANIONGAP 6 (L) 07/11/2022     (H) 07/11/2022     07/10/2022    GLU 98 07/09/2022       No results found for this or any previous visit.              Pre-op Assessment    I have reviewed the Patient Summary Reports.     I have reviewed the Nursing Notes. I have reviewed the NPO Status.   I have reviewed the Medications.     Review of Systems  Anesthesia Hx:  No problems with previous Anesthesia  Denies Family Hx of Anesthesia  complications.   Denies Personal Hx of Anesthesia complications.   Social:  Former Smoker    Hematology/Oncology:         -- Anemia: -- Thrombocytopenia:    Cardiovascular:   Pacemaker (MEDTRONIC) Hypertension, well controlled Dysrhythmias (hx of A fib, heart block, s/p dual lead pacemaker placement) atrial fibrillation Orthopnea PVD    Pulmonary:   COPD, mild Shortness of breath    Renal/:   Chronic Renal Disease (stage 3 CRI), CRI    Hepatic/GI:   Hx pancreatitis Hepatic/GI Symptoms: (biliary obstruction)    Musculoskeletal:   Arthritis (gouty arthritis)     Neurological:  Neuro Symptoms (benign tremor)   Endocrine:   Diabetes, well controlled, type 2        Physical Exam  General: Well nourished, Cooperative, Alert and Oriented    Airway:  Mallampati: III / II  Mouth Opening: Normal  TM Distance: Normal  Tongue: Normal  Neck ROM: Normal ROM    Dental:  Intact  Poor dentition.  Multiple missing teeth.  The remaining incisor looks necrotic, but the patient denies any loose teeth.  Chest/Lungs:  Clear to auscultation    Heart:  Rate: Normal  Rhythm: Regular Rhythm  Sounds: Normal    Abdomen:  Normal, Soft, Nontender        Anesthesia Plan  Type of Anesthesia, risks & benefits discussed:    Anesthesia Type: Gen ETT  Intra-op Monitoring Plan: Standard ASA Monitors  Post Op Pain Control Plan: IV/PO Opioids PRN  Induction:  IV  Airway Plan: Video, Post-Induction  Informed Consent: Informed consent signed with the Patient and all parties understand the risks and agree with anesthesia plan.  All questions answered.   ASA Score: 3  Anesthesia Plan Notes: GETA.  PONV prophylaxis with Pepcid 20 mg IV, and Zofran 4 mg IV, Decadron 4 mg.        Ready For Surgery From Anesthesia Perspective.     .

## 2022-07-12 NOTE — PROGRESS NOTES
Critical access hospital Medicine Progress Note  Patient Name: Kennedy Thomas MRN: [id number]   Patient Class: IP- Inpatient  Length of Stay: 1   Admission Date: 7/7/2022  8:51 PM Attending Physician: Jaja Berg MD   Primary Care Provider: Fernando Eagle MD Face-to-Face encounter date: 07/12/2022   Chief Complaint: Flank Pain (Right lower back pain and right flank pain starting today, denies n/v/d)      Subjective:    Interval History   Patient is doing fairly well.   No issues with lap latonia.  Denies chest pain, shortness of breath, palpitations, abdominal pain, nausea/vomiting.   No concerns/issues overnight reported by the patient or the nursing staff.  Reviewed the labs and discussed the plan of care.   Discussed with family. They would like him to go home tomorrow    Review of Systems   All other Review of Systems were found to be negative expect for that mentioned already in HPI.     Objective:   Physical Exam  BP (!) 172/87 (BP Location: Left arm, Patient Position: Lying)   Pulse 73   Temp 98.8 °F (37.1 °C) (Oral)   Resp 18   Ht 6' (1.829 m)   Wt 106.1 kg (233 lb 12.8 oz)   SpO2 (!) 93% Comment: RA  BMI 31.71 kg/m²   Constitutional: No distress.   HENT: Atraumatic.   Cardiovascular: Normal rate, regular rhythm and normal heart sounds.   Pulmonary/Chest: Effort normal. Clear to auscultation bilaterally. No wheezes.   Abdominal: Soft. Bowel sounds are normal. Exhibits no distension and no mass. No tenderness  Neurological: Alert.   Skin: Skin is warm and dry.     Labs and Imaging    Significant Labs: All pertinent labs within the past 24 hours have been reviewed.    Significant Imaging: I have reviewed all pertinent imaging results/findings within the past 24 hours.    I have reviewed the Vitals, labs and imaging as above.     Assessment & Plan:   Kennedy Thomas is a 81 y.o. male admitted for    Calculus of bile duct without cholecystitis with obstruction - s/p lap latonia, plan of  ERCP today    Core measures:  - Code status: full code    - Diet: Regular  VTE Risk Mitigation (From admission, onward)         Ordered     IP VTE HIGH RISK PATIENT  Once         07/08/22 0346     Place sequential compression device  Until discontinued         07/08/22 0346     Reason for No Pharmacological VTE Prophylaxis  Once        Question:  Reasons:  Answer:  Already adequately anticoagulated on oral Anticoagulants    07/08/22 0346                Discharge Planning:   Discharge Planning   NANCIE: 07/12/2022    Code Status: Full Code   Is the patient medically ready for discharge?: No    Reason for patient still in hospital (select all that apply): Patient trending condition  Discharge Plan A: Home with assistance from family        Above encounter included review of the medical records, interviewing and examining the patient face-to-face, discussion with family and other health care providers, ordering and interpreting lab/test results and formulating a plan of care.     Medical Decision Making:  [] Low Complexity  [x] Moderate Complexity  [] High Complexity    Jaja Berg MD  [facility] Hospitalist  07/12/2022

## 2022-07-12 NOTE — [employer] PATIENT INSTRUCTIONS
Discharge Summary/Instructions after an Endoscopic Procedure  Patient Name: Kennedy Thomas  Patient MRN: [id number]  Patient YOB: 1940 Tuesday, July 12, 2022  Dale Luciano III, MD  RESTRICTIONS:  During your procedure today, you received medications for sedation.  These   medications may affect your judgment, balance and coordination.  Therefore,   for 24 hours, you have the following restrictions:   - DO NOT drive a car, operate machinery, make legal/financial decisions,   sign important papers or drink alcohol.    ACTIVITY:  Today: no heavy lifting, straining or running due to procedural   sedation/anesthesia.  The following day: return to full activity including work.  DIET:  Eat and drink normally unless instructed otherwise.     TREATMENT FOR COMMON SIDE EFFECTS:  - Mild abdominal pain, nausea, belching, bloating or excessive gas:  rest,   eat lightly and use a heating pad.  - Sore Throat: treat with throat lozenges and/or gargle with warm salt   water.  - Because air was used during the procedure, expelling large amounts of air   from your rectum or belching is normal.  - If a bowel prep was taken, you may not have a bowel movement for 1-3 days.    This is normal.  SYMPTOMS TO WATCH FOR AND REPORT TO YOUR PHYSICIAN:  1. Abdominal pain or bloating, other than gas cramps.  2. Chest pain.  3. Back pain.  4. Signs of infection such as: chills or fever occurring within 24 hours   after the procedure.  5. Rectal bleeding, which would show as bright red, maroon, or black stools.   (A tablespoon of blood from the rectum is not serious, especially if   hemorrhoids are present.)  6. Vomiting.  7. Weakness or dizziness.  GO DIRECTLY TO THE NEAREST EMERGENCY ROOM IF YOU HAVE ANY OF THE FOLLOWING:      Difficulty breathing              Chills and/or fever over 101 F   Persistent vomiting and/or vomiting blood   Severe abdominal pain   Severe chest pain   Black, tarry stools   Bleeding- more than one  tablespoon   Any other symptom or condition that you feel may need urgent attention  Your doctor recommends these additional instructions:  If any biopsies were taken, your doctors clinic will contact you in 1 to 2   weeks with any results.  - Advance diet as tolerated.   - Continue present medications.   - Resume anticoagulation at prior dose Thursday morning.   - Return patient to hospital [facility] for ongoing care.  For questions, problems or results please call your physician - Dale Luciano III, MD at Work:  [phone].  [facility], EMERGENCY ROOM PHONE NUMBER: [phone]  IF A COMPLICATION OR EMERGENCY SITUATION ARISES AND YOU ARE UNABLE TO REACH   YOUR PHYSICIAN - GO DIRECTLY TO THE EMERGENCY ROOM.  Dale Luciano III, MD  7/12/2022 2:02:01 PM  This report has been verified and signed electronically.  Dear patient,  As a result of recent federal legislation (The Federal Cures Act), you may   receive lab or pathology results from your procedure in your [facility]   account before your physician is able to contact you. Your physician or   their representative will relay the results to you with their   recommendations at their soonest availability.  Thank you,  [employer]ATION

## 2022-07-12 NOTE — ANESTHESIA POSTPROCEDURE EVALUATION
Anesthesia Post Evaluation    Patient: Kennedy Thomas    Procedure(s) Performed: Procedure(s) (LRB):  ERCP (ENDOSCOPIC RETROGRADE CHOLANGIOPANCREATOGRAPHY) (N/A)    Final Anesthesia Type: general      Patient location during evaluation: PACU  Patient participation: Yes- Able to Participate  Level of consciousness: awake and alert  Post-procedure vital signs: reviewed and stable  Pain management: adequate  Airway patency: patent    PONV status at discharge: No PONV  Anesthetic complications: no      Cardiovascular status: stable  Respiratory status: unassisted and spontaneous ventilation  Hydration status: euvolemic  Follow-up not needed.          Vitals Value Taken Time   /90 07/12/22 1430   Temp 36.9 °C (98.4 °F) 07/12/22 1400   Pulse 64 07/12/22 1441   Resp 13 07/12/22 1441   SpO2 99 % 07/12/22 1441   Vitals shown include unvalidated device data.      No case tracking events are documented in the log.      Pain/Dara Score: Pain Rating Prior to Med Admin: 3 (7/11/2022  6:24 PM)  Pain Rating Post Med Admin: 3 (7/11/2022  7:24 PM)  Dara Score: 10 (7/12/2022  2:30 PM)

## 2022-07-12 NOTE — CARE UPDATE
07/12/22 0724   PRE-TX-O2   O2 Device (Oxygen Therapy) room air   $ Is the patient on Low Flow Oxygen? Yes   SpO2 97 %   Pulse Oximetry Type Intermittent   $ Pulse Oximetry - Multiple Charge Pulse Oximetry - Multiple   Pulse 79   Resp 18   Respiratory Evaluation   $ Care Plan Tech Time 15 min

## 2022-07-13 VITALS
WEIGHT: 233.81 LBS | OXYGEN SATURATION: 96 % | DIASTOLIC BLOOD PRESSURE: 73 MMHG | BODY MASS INDEX: 31.67 KG/M2 | RESPIRATION RATE: 18 BRPM | TEMPERATURE: 98 F | HEIGHT: 72 IN | HEART RATE: 60 BPM | SYSTOLIC BLOOD PRESSURE: 141 MMHG

## 2022-07-13 LAB
BASOPHILS # BLD AUTO: 0.04 K/UL (ref 0–0.2)
BASOPHILS NFR BLD: 0.8 % (ref 0–1.9)
DIFFERENTIAL METHOD: ABNORMAL
EOSINOPHIL # BLD AUTO: 0.1 K/UL (ref 0–0.5)
EOSINOPHIL NFR BLD: 2.7 % (ref 0–8)
ERYTHROCYTE [DISTWIDTH] IN BLOOD BY AUTOMATED COUNT: 13.4 % (ref 11.5–14.5)
HCT VFR BLD AUTO: 32.1 % (ref 40–54)
HGB BLD-MCNC: 10.9 G/DL (ref 14–18)
IMM GRANULOCYTES # BLD AUTO: 0.01 K/UL (ref 0–0.04)
IMM GRANULOCYTES NFR BLD AUTO: 0.2 % (ref 0–0.5)
INR PPP: 1.6
INR PPP: 1.6
LYMPHOCYTES # BLD AUTO: 1 K/UL (ref 1–4.8)
LYMPHOCYTES NFR BLD: 18.2 % (ref 18–48)
MCH RBC QN AUTO: 31.3 PG (ref 27–31)
MCHC RBC AUTO-ENTMCNC: 34 G/DL (ref 32–36)
MCV RBC AUTO: 92 FL (ref 82–98)
MONOCYTES # BLD AUTO: 0.7 K/UL (ref 0.3–1)
MONOCYTES NFR BLD: 12.6 % (ref 4–15)
NEUTROPHILS # BLD AUTO: 3.4 K/UL (ref 1.8–7.7)
NEUTROPHILS NFR BLD: 65.5 % (ref 38–73)
NRBC BLD-RTO: 0 /100 WBC
OVALOCYTES BLD QL SMEAR: ABNORMAL
PLATELET # BLD AUTO: 85 K/UL (ref 150–450)
PLATELET BLD QL SMEAR: ABNORMAL
PMV BLD AUTO: 11 FL (ref 9.2–12.9)
PROTHROMBIN TIME: 18.1 SEC (ref 11.4–13.7)
PROTHROMBIN TIME: 18.1 SEC (ref 11.4–13.7)
RBC # BLD AUTO: 3.48 M/UL (ref 4.6–6.2)
WBC # BLD AUTO: 5.23 K/UL (ref 3.9–12.7)

## 2022-07-13 PROCEDURE — 99900035 HC TECH TIME PER 15 MIN (STAT)

## 2022-07-13 PROCEDURE — 94761 N-INVAS EAR/PLS OXIMETRY MLT: CPT

## 2022-07-13 PROCEDURE — 25000003 PHARM REV CODE 250: Performed by: SURGERY

## 2022-07-13 PROCEDURE — 36415 COLL VENOUS BLD VENIPUNCTURE: CPT | Performed by: SURGERY

## 2022-07-13 PROCEDURE — 99900031 HC PATIENT EDUCATION (STAT)

## 2022-07-13 PROCEDURE — 85025 COMPLETE CBC W/AUTO DIFF WBC: CPT | Performed by: SURGERY

## 2022-07-13 PROCEDURE — 85610 PROTHROMBIN TIME: CPT | Performed by: SURGERY

## 2022-07-13 RX ORDER — CEFADROXIL 500 MG/1
500 CAPSULE ORAL EVERY 12 HOURS
Qty: 10 CAPSULE | Refills: 0 | Status: SHIPPED | OUTPATIENT
Start: 2022-07-13 | End: 2022-07-18

## 2022-07-13 RX ADMIN — METOPROLOL SUCCINATE 25 MG: 25 TABLET, EXTENDED RELEASE ORAL at 09:07

## 2022-07-13 RX ADMIN — Medication 5000 UNITS: at 09:07

## 2022-07-13 RX ADMIN — ALLOPURINOL 100 MG: 100 TABLET ORAL at 09:07

## 2022-07-13 RX ADMIN — HYDROXYCHLOROQUINE SULFATE 200 MG: 200 TABLET, FILM COATED ORAL at 09:07

## 2022-07-13 RX ADMIN — TAMSULOSIN HYDROCHLORIDE 0.4 MG: 0.4 CAPSULE ORAL at 09:07

## 2022-07-13 RX ADMIN — LISINOPRIL 40 MG: 20 TABLET ORAL at 09:07

## 2022-07-13 RX ADMIN — BUMETANIDE 1 MG: 1 TABLET ORAL at 09:07

## 2022-07-13 NOTE — PLAN OF CARE
07/13/22 1006   Final Note   Assessment Type Final Discharge Note   Anticipated Discharge Disposition Home   What phone number can be called within the next 1-3 days to see how you are doing after discharge? [phone]   Hospital Resources/Appts/Education Provided Provided patient/caregiver with written discharge plan information;Appointments scheduled and added to AVS   Post-Acute Status   Discharge Delays None known at this time       Orders reviewed by case management. No needs for discharge. Pt clear for discharge from case management standpoint.

## 2022-07-13 NOTE — CARE UPDATE
07/13/22 0900   Patient Assessment/Suction   Level of Consciousness (AVPU) alert   Respiratory Effort Normal;Unlabored   PRE-TX-O2   O2 Device (Oxygen Therapy) room air   SpO2 96 %   Pulse Oximetry Type Intermittent   $ Pulse Oximetry - Multiple Charge Pulse Oximetry - Multiple   Pulse 60   Resp 18   Education   $ Education 15 min   Respiratory Evaluation   $ Care Plan Tech Time 15 min

## 2022-07-13 NOTE — DISCHARGE SUMMARY
[facility]  Discharge Summary  Patient Name: Kennedy Thomas MRN: [id number]   Patient Class: IP- Inpatient  Length of Stay: 2   Admission Date: 7/7/2022  8:51 PM Attending Physician: Jaja Berg MD   Primary Care Provider: Fernando Eagle MD Face-to-Face encounter date: 07/13/2022   Chief Complaint: Flank Pain (Right lower back pain and right flank pain starting today, denies n/v/d)    Date of Discharge: 7/13/2022  Discharge Disposition:Home or Self Care   Condition: Stable       Reason for Hospitalization   Calculus of bile duct without cholecystitis with obstruction     Patient Active Problem List   Diagnosis    Pancytopenia    Multiple chronic diseases    Calculus of bile duct without cholecystitis with obstruction       Brief History of Present Illness    Kennedy Thomas is a 81 y.o.  male who  has a past medical history of Arthritis, Atrial fibrillation, CKD (chronic kidney disease), COPD (chronic obstructive pulmonary disease), Diabetes mellitus, type 2, Gout, Heart block, Hypertension, Pancreatitis, and Peripheral vascular disease.. The patient presented to [facility] on 7/7/2022 with a primary complaint of Flank Pain (Right lower back pain and right flank pain starting today, denies n/v/d)  .     For the full HPI please refer to the History & Physical from this admission.    Hospital Course By Problem with Pertinent Findings     Patient admitted for Calculus of bile duct without cholecystitis with obstruction. Patient had cholecystectomy with IOC that showed biliary disease. This was followed by ERCP with biliary sphincterotomy. Patient observed overnight. Patient warfarin was changed to elliquis that he is going to start day after discharge. On day of discharge, he had early cellulitic changes on the abdomen. He was started on PO Cefadroxil 500mg BID.     Patient was seen and examined on the date of discharge and determined to be suitable for discharge.    Physical  Exam  BP (!) 141/73   Pulse 60   Temp 97.9 °F (36.6 °C) (Oral)   Resp 18   Ht 6' (1.829 m)   Wt 106.1 kg (233 lb 12.8 oz)   SpO2 96%   BMI 31.71 kg/m²   Vitals reviewed.    Constitutional: No distress.   HENT: Atraumatic.   Cardiovascular: Normal rate, regular rhythm and normal heart sounds.   Pulmonary/Chest: Effort normal. Clear to auscultation bilaterally. No wheezes.   Abdominal: Soft. Bowel sounds are normal. Exhibits no distension and no mass. No tenderness  Neurological: Alert.   Skin: Skin is warm and dry.     Following labs were Reviewed   Recent Labs   Lab 07/13/22  0536   WBC 5.23   HGB 10.9*   HCT 32.1*   PLT 85*     No results found for: POCTGLUCOSE     All labs within the past 24 hours have been reviewed    Microbiology Results (last 7 days)     ** No results found for the last 168 hours. **        FL ERCP Biliary And Pancreatic By [employer] Tech   Final Result      FL Flouro Usage   Final Result      CT Abdomen Pelvis With Contrast   Final Result      US Abdomen Limited   Final Result          No results found for this or any previous visit.      Consultants and Procedures   Consultants:  Consults (From admission, onward)        Status Ordering Provider     Inpatient consult to General Surgery  Once        Provider:  (Not yet assigned)    Completed WILLIAM ONEIL     Inpatient consult to Gastroenterology  Once        Provider:  Bobby Jennings MD    Completed JEANINE KIMBALL     Inpatient consult to Internal Medicine  Once        Provider:  Jeanine Kimball MD    Acknowledged JUAN PEIDRA     Inpatient consult to Gastroenterology  Once        Provider:  Bobby Jennings MD    Acknowledged JUAN PIEDRA          Procedures:   Lap-Naz  ERCP    Discharge Information:   Diet:  Resume regular diet    Physical Activity:  Activity as tolerated    Instructions:  1. Take all medications as prescribed  2. Keep all follow-up appointments  3. Return to the hospital or call your primary care  physicians if any worsening symptoms such as chest pain, shortness of breath, abdominal pain occur.    Follow-Up Appointments:  1. Please call your primary care physician to schedule an appointment in 1 week time.       Follow-up Information     Fernando Eagle MD Follow up in 1 week(s).    Specialty: Family Medicine  Contact information:  [place] [id number]  [phone]                           Pending laboratory work/Tests to be performed/followed by the Primary care Physician: none    The patient was discharged in the care of her parents//wife/family/caregiver, with discharge instructions were reviewed in written and verbal form. All pertinent questions were discussed and prescriptions were provided. The importance of making follow up appointments and compliance of medications has been stressed repeatedly. The patient will follow up in 1 week or sooner as needed with the PCP, and the patient is on board with the plan. Upon discharge, patient needs to be on following medications.    Discharge Medications:     Medication List      START taking these medications    apixaban 5 mg Tab  Commonly known as: ELIQUIS  Take 1 tablet (5 mg total) by mouth 2 (two) times daily.     cefadroxil 500 MG Cap  Commonly known as: DURICEF  Take 1 capsule (500 mg total) by mouth every 12 (twelve) hours. for 5 days        CHANGE how you take these medications    allopurinoL 100 MG tablet  Commonly known as: ZYLOPRIM  TAKE 1 TABLET(100 MG) BY MOUTH EVERY DAY  What changed: when to take this        CONTINUE taking these medications    benazepriL 40 MG tablet  Commonly known as: LOTENSIN     bumetanide 1 MG tablet  Commonly known as: BUMEX     hydrOXYchloroQUINE 200 mg tablet  Commonly known as: PLAQUENIL  TAKE 1 TABLET(200 MG) BY MOUTH EVERY DAY     metoprolol succinate 50 MG 24 hr tablet  Commonly known as: TOPROL-XL     ondansetron 4 MG Tbdl  Commonly known as: ZOFRAN-ODT  Take 1 tablet (4 mg total) by mouth  every 6 (six) hours as needed.     potassium chloride 10 MEQ Tbsr  Commonly known as: KLOR-CON     SYMBICORT 160-4.5 mcg/actuation Hfaa  Generic drug: budesonide-formoterol 160-4.5 mcg     terazosin 5 MG capsule  Commonly known as: HYTRIN     vitamin D 1000 units Tab  Commonly known as: VITAMIN D3        STOP taking these medications    donepeziL 5 MG tablet  Commonly known as: ARICEPT     warfarin 5 MG tablet  Commonly known as: COUMADIN           Where to Get Your Medications      These medications were sent to [id number] - [place] [id number] [facility] [address] [address]    Hours: 24-hours Phone: [phone]   · apixaban 5 mg Tab  · cefadroxil 500 MG Cap           I spent 40 minutes preparing the discharge including reviewing records from previous encounters, preparation of discharge summary, assessing and final examination of the patient, discharge medicine reconciliation, discussing plan of care, follow up and education and prescriptions.       Jaja Berg  [facility] Hospitalist  07/13/2022

## 2022-07-13 NOTE — NURSING
Patient given discharge instructions understanding verbalized. IV discontninued patient tolerated well. Patient discharged via wheelchair to personal vehicle.

## 2022-07-13 NOTE — PROGRESS NOTES
[facility]  Adult Nutrition   Progress Note (Initial Assessment)     SUMMARY     Recommendations  Recommendation/Intervention:  1. Continue current diet as tolerated. RD added Diabetic per hx DM 2, diet controlled .   3.  to obtain daily meal choices.    Goals: 1. Intake of meals to be >/= 75% estimated needs. 2. Lab values trend to target range.  Nutrition Goal Status: new    Dietitian Rounds Brief  Patient screen for LOS. Patient has discharge order on chart. Patient reports good appetite and that he ate his breakfast. Patient reports he does not drink a supplement at home and declined one while in hospital. RD added Diabetic to diet per hx diet-controlled DM 2. RD to follow PRN.    Diet order:   Current Diet Order: Dysphagia Soft (IDDSI Level 6), Cardiac, Diabetic diet      Evaluation of Received Nutrient/Fluid Intake  Energy Calories Required: meeting needs  Protein Required: meeting needs  Fluid Required: meeting needs  Tolerance: tolerating     % Intake of Estimated Energy Needs: 75 - 100 %  % Meal Intake: 75 - 100 %      Intake/Output Summary (Last 24 hours) at 7/13/2022 1131  Last data filed at 7/12/2022 1405  Gross per 24 hour   Intake 500 ml   Output --   Net 500 ml        Anthropometrics  Temp: 97.9 °F (36.6 °C)  Height Method: Stated  Height: 6' (182.9 cm)  Height (inches): 72 in  Weight Method: Bed Scale  Weight: 106.1 kg (233 lb 12.8 oz)  Weight (lb): 233.8 lb  Ideal Body Weight (IBW), Male: 178 lb  % Ideal Body Weight, Male (lb): 131.35 %  BMI (Calculated): 31.7       Estimated/Assessed Needs  Weight Used For Calorie Calculations: 106.1 kg (233 lb 14.5 oz)  Energy Calorie Requirements (kcal): 0637-8198 kcal/day (20-25 kcal/kg)     Protein Requirements: 121-130 gm/day (1.5-1.6 gm/kg IBW)  Weight Used For Protein Calculations: 81 kg (178 lb 9.2 oz) (IBW)     Estimated Fluid Requirement Method: RDA Method  RDA Method (mL): 2122       Reason for Assessment  Reason For Assessment:  length of stay  Diagnosis: other (see comments) (calculus of bile duct with obstruction and without cholangitis)  Relevant Medical History: DM 2 (diet controlled), HTN, PAF, COPD, Cholelithiasis    Nutrition Risk Screen  Nutrition Risk Screen: no indicators present     MST Score: 2  Have you recently lost weight without trying?: Yes: 2-13 lbs  Weight loss score: 1  Have you been eating poorly because of a decreased appetite?: Yes  Appetite score: 1       Weight History:  Wt Readings from Last 5 Encounters:   07/08/22 106.1 kg (233 lb 12.8 oz)   05/24/22 104.2 kg (229 lb 11.5 oz)   05/23/22 104.3 kg (230 lb)   04/08/22 104.7 kg (230 lb 12.8 oz)   01/27/22 106.1 kg (234 lb)        Lab/Procedures/Meds: Pertinent Labs/Meds Reviewed    Medications:Pertinent Medications Reviewed  Scheduled Meds:   allopurinoL  100 mg Oral Daily    apixaban  5 mg Oral BID    bumetanide  1 mg Oral Daily    hydrOXYchloroQUINE  200 mg Oral Daily    lisinopriL  40 mg Oral Daily    metoprolol succinate  25 mg Oral BID    tamsulosin  0.4 mg Oral Daily    vitamin D  5,000 Units Oral Daily     Continuous Infusions:  PRN Meds:.acetaminophen, calcium chloride IVPB, calcium chloride IVPB, calcium chloride IVPB, dextrose 10%, dextrose 10%, HYDROmorphone, HYDROmorphone, insulin aspart U-100, magnesium oxide, magnesium sulfate IVPB, magnesium sulfate IVPB, magnesium sulfate IVPB, magnesium sulfate IVPB, melatonin, ondansetron, oxyCODONE, potassium chloride, potassium chloride, potassium chloride, potassium chloride, sodium phosphate IVPB, sodium phosphate IVPB, sodium phosphate IVPB, sodium phosphate IVPB, sodium phosphate IVPB    Labs: Pertinent Labs Reviewed  Clinical Chemistry:  Recent Labs   Lab 07/07/22  2119 07/11/22  0543    138   K 3.7 3.8    100   CO2 27 32*   * 119*   BUN 18 13   CREATININE 0.9 0.8   CALCIUM 9.2 9.2   PROT 7.3 7.2   ALBUMIN 4.4 4.3   BILITOT 4.5* 2.3*   ALKPHOS 98 92   * 38   ALT 98* 58*    ANIONGAP 9 6*   ESTGFRAFRICA >60.0 >60.0   EGFRNONAA >60.0 >60.0   LIPASE 73*  --     < > = values in this interval not displayed.     CBC:   Recent Labs   Lab 07/13/22  0536   WBC 5.23   RBC 3.48*   HGB 10.9*   HCT 32.1*   PLT 85*   MCV 92   MCH 31.3*   MCHC 34.0     Cardiac Profile:  Recent Labs   Lab 07/07/22  2119   TROPONINI <0.030     Diabetes:  No results for input(s): HGBA1C, POCTGLUCOSE in the last 168 hours.    Monitor and Evaluation  Food and Nutrient Intake: energy intake, food and beverage intake  Food and Nutrient Adminstration: diet order  Knowledge/Beliefs/Attitudes: food and nutrition knowledge/skill  Physical Activity and Function: nutrition-related ADLs and IADLs  Anthropometric Measurements: weight, weight change, body mass index  Biochemical Data, Medical Tests and Procedures: electrolyte and renal panel, inflammatory profile, gastrointestinal profile, lipid profile, glucose/endocrine profile  Nutrition-Focused Physical Findings: overall appearance     Nutrition Risk  Level of Risk/Frequency of Follow-up: moderate - high     Nutrition Follow-Up  RD Follow-up?: Yes      Carole Pérez RD, LDN 07/13/2022 8:44 AM

## 2022-07-27 ENCOUNTER — OFFICE VISIT (OUTPATIENT)
Dept: RHEUMATOLOGY | Facility: CLINIC | Age: 82
End: 2022-07-27
Payer: MEDICARE

## 2022-07-27 ENCOUNTER — LAB VISIT (OUTPATIENT)
Dept: LAB | Facility: HOSPITAL | Age: 82
End: 2022-07-27
Attending: INTERNAL MEDICINE
Payer: MEDICARE

## 2022-07-27 VITALS
SYSTOLIC BLOOD PRESSURE: 134 MMHG | DIASTOLIC BLOOD PRESSURE: 77 MMHG | WEIGHT: 229.88 LBS | BODY MASS INDEX: 31.18 KG/M2

## 2022-07-27 DIAGNOSIS — Z79.899 ENCOUNTER FOR LONG-TERM (CURRENT) DRUG USE: ICD-10-CM

## 2022-07-27 DIAGNOSIS — M10.9 GOUT, UNSPECIFIED CAUSE, UNSPECIFIED CHRONICITY, UNSPECIFIED SITE: Primary | ICD-10-CM

## 2022-07-27 DIAGNOSIS — M05.79 RHEUMATOID ARTHRITIS INVOLVING MULTIPLE SITES WITH POSITIVE RHEUMATOID FACTOR: ICD-10-CM

## 2022-07-27 DIAGNOSIS — M10.9 GOUT, UNSPECIFIED CAUSE, UNSPECIFIED CHRONICITY, UNSPECIFIED SITE: ICD-10-CM

## 2022-07-27 LAB
ALBUMIN SERPL BCP-MCNC: 4.3 G/DL (ref 3.5–5.2)
ALP SERPL-CCNC: 85 U/L (ref 55–135)
ALT SERPL W/O P-5'-P-CCNC: 22 U/L (ref 10–44)
ANION GAP SERPL CALC-SCNC: 8 MMOL/L (ref 8–16)
AST SERPL-CCNC: 22 U/L (ref 10–40)
BASOPHILS # BLD AUTO: 0.06 K/UL (ref 0–0.2)
BASOPHILS NFR BLD: 1.2 % (ref 0–1.9)
BILIRUB SERPL-MCNC: 1.3 MG/DL (ref 0.1–1)
BUN SERPL-MCNC: 18 MG/DL (ref 8–23)
CALCIUM SERPL-MCNC: 9.2 MG/DL (ref 8.7–10.5)
CHLORIDE SERPL-SCNC: 101 MMOL/L (ref 95–110)
CO2 SERPL-SCNC: 30 MMOL/L (ref 23–29)
CREAT SERPL-MCNC: 0.8 MG/DL (ref 0.5–1.4)
CRP SERPL-MCNC: 0.13 MG/DL
DIFFERENTIAL METHOD: ABNORMAL
EOSINOPHIL # BLD AUTO: 0.1 K/UL (ref 0–0.5)
EOSINOPHIL NFR BLD: 2.7 % (ref 0–8)
ERYTHROCYTE [DISTWIDTH] IN BLOOD BY AUTOMATED COUNT: 13.8 % (ref 11.5–14.5)
EST. GFR  (AFRICAN AMERICAN): >60 ML/MIN/1.73 M^2
EST. GFR  (NON AFRICAN AMERICAN): >60 ML/MIN/1.73 M^2
GLUCOSE SERPL-MCNC: 115 MG/DL (ref 70–110)
HCT VFR BLD AUTO: 34.9 % (ref 40–54)
HGB BLD-MCNC: 11.6 G/DL (ref 14–18)
IMM GRANULOCYTES # BLD AUTO: 0.01 K/UL (ref 0–0.04)
IMM GRANULOCYTES NFR BLD AUTO: 0.2 % (ref 0–0.5)
LYMPHOCYTES # BLD AUTO: 1.6 K/UL (ref 1–4.8)
LYMPHOCYTES NFR BLD: 32.4 % (ref 18–48)
MCH RBC QN AUTO: 31.8 PG (ref 27–31)
MCHC RBC AUTO-ENTMCNC: 33.2 G/DL (ref 32–36)
MCV RBC AUTO: 96 FL (ref 82–98)
MONOCYTES # BLD AUTO: 0.5 K/UL (ref 0.3–1)
MONOCYTES NFR BLD: 11 % (ref 4–15)
NEUTROPHILS # BLD AUTO: 2.5 K/UL (ref 1.8–7.7)
NEUTROPHILS NFR BLD: 52.5 % (ref 38–73)
NRBC BLD-RTO: 0 /100 WBC
PLATELET # BLD AUTO: 142 K/UL (ref 150–450)
PMV BLD AUTO: 10.5 FL (ref 9.2–12.9)
POTASSIUM SERPL-SCNC: 3.9 MMOL/L (ref 3.5–5.1)
PROT SERPL-MCNC: 7.3 G/DL (ref 6–8.4)
RBC # BLD AUTO: 3.65 M/UL (ref 4.6–6.2)
SODIUM SERPL-SCNC: 139 MMOL/L (ref 136–145)
URATE SERPL-MCNC: 5.3 MG/DL (ref 3.4–7)
WBC # BLD AUTO: 4.82 K/UL (ref 3.9–12.7)

## 2022-07-27 PROCEDURE — 85025 COMPLETE CBC W/AUTO DIFF WBC: CPT | Performed by: INTERNAL MEDICINE

## 2022-07-27 PROCEDURE — 99213 PR OFFICE/OUTPT VISIT, EST, LEVL III, 20-29 MIN: ICD-10-PCS | Mod: S$GLB,,, | Performed by: INTERNAL MEDICINE

## 2022-07-27 PROCEDURE — 1160F PR REVIEW ALL MEDS BY PRESCRIBER/CLIN PHARMACIST DOCUMENTED: ICD-10-PCS | Mod: CPTII,S$GLB,, | Performed by: INTERNAL MEDICINE

## 2022-07-27 PROCEDURE — 3078F PR MOST RECENT DIASTOLIC BLOOD PRESSURE < 80 MM HG: ICD-10-PCS | Mod: CPTII,S$GLB,, | Performed by: INTERNAL MEDICINE

## 2022-07-27 PROCEDURE — 84550 ASSAY OF BLOOD/URIC ACID: CPT | Performed by: INTERNAL MEDICINE

## 2022-07-27 PROCEDURE — 1111F DSCHRG MED/CURRENT MED MERGE: CPT | Mod: CPTII,S$GLB,, | Performed by: INTERNAL MEDICINE

## 2022-07-27 PROCEDURE — 1111F PR DISCHARGE MEDS RECONCILED W/ CURRENT OUTPATIENT MED LIST: ICD-10-PCS | Mod: CPTII,S$GLB,, | Performed by: INTERNAL MEDICINE

## 2022-07-27 PROCEDURE — 3288F FALL RISK ASSESSMENT DOCD: CPT | Mod: CPTII,S$GLB,, | Performed by: INTERNAL MEDICINE

## 2022-07-27 PROCEDURE — 99213 OFFICE O/P EST LOW 20 MIN: CPT | Mod: S$GLB,,, | Performed by: INTERNAL MEDICINE

## 2022-07-27 PROCEDURE — 1159F PR MEDICATION LIST DOCUMENTED IN MEDICAL RECORD: ICD-10-PCS | Mod: CPTII,S$GLB,, | Performed by: INTERNAL MEDICINE

## 2022-07-27 PROCEDURE — 1159F MED LIST DOCD IN RCRD: CPT | Mod: CPTII,S$GLB,, | Performed by: INTERNAL MEDICINE

## 2022-07-27 PROCEDURE — 86140 C-REACTIVE PROTEIN: CPT | Performed by: INTERNAL MEDICINE

## 2022-07-27 PROCEDURE — 36415 COLL VENOUS BLD VENIPUNCTURE: CPT | Performed by: INTERNAL MEDICINE

## 2022-07-27 PROCEDURE — 1101F PR PT FALLS ASSESS DOC 0-1 FALLS W/OUT INJ PAST YR: ICD-10-PCS | Mod: CPTII,S$GLB,, | Performed by: INTERNAL MEDICINE

## 2022-07-27 PROCEDURE — 1160F RVW MEDS BY RX/DR IN RCRD: CPT | Mod: CPTII,S$GLB,, | Performed by: INTERNAL MEDICINE

## 2022-07-27 PROCEDURE — 1101F PT FALLS ASSESS-DOCD LE1/YR: CPT | Mod: CPTII,S$GLB,, | Performed by: INTERNAL MEDICINE

## 2022-07-27 PROCEDURE — 3075F PR MOST RECENT SYSTOLIC BLOOD PRESS GE 130-139MM HG: ICD-10-PCS | Mod: CPTII,S$GLB,, | Performed by: INTERNAL MEDICINE

## 2022-07-27 PROCEDURE — 80053 COMPREHEN METABOLIC PANEL: CPT | Performed by: INTERNAL MEDICINE

## 2022-07-27 PROCEDURE — 3075F SYST BP GE 130 - 139MM HG: CPT | Mod: CPTII,S$GLB,, | Performed by: INTERNAL MEDICINE

## 2022-07-27 PROCEDURE — 1125F PR PAIN SEVERITY QUANTIFIED, PAIN PRESENT: ICD-10-PCS | Mod: CPTII,S$GLB,, | Performed by: INTERNAL MEDICINE

## 2022-07-27 PROCEDURE — 3078F DIAST BP <80 MM HG: CPT | Mod: CPTII,S$GLB,, | Performed by: INTERNAL MEDICINE

## 2022-07-27 PROCEDURE — 3288F PR FALLS RISK ASSESSMENT DOCUMENTED: ICD-10-PCS | Mod: CPTII,S$GLB,, | Performed by: INTERNAL MEDICINE

## 2022-07-27 PROCEDURE — 1125F AMNT PAIN NOTED PAIN PRSNT: CPT | Mod: CPTII,S$GLB,, | Performed by: INTERNAL MEDICINE

## 2022-07-27 NOTE — PROGRESS NOTES
[facility] RHEUMATOLOGY           Follow-up visit    Notes dictated to M*Modal. Please forgive any unintended errors.  Subjective:       Patient ID:   NAME: Kennedy Thomas : 1940     81 y.o. male    Referring Doc: No ref. provider found  Other Physicians:    Chief Complaint:  Rheumatoid Arthritis      HPI/Interval History:  The patient is doing well.  He has had no episodes of gout.  He has not had any rheumatoid like activity    ROS:   GEN:    no fever, night sweats or weight loss  SKIN:   no rashes, bruising, or swelling, no Raynauds, no photosensitivity  HEENT: no changes in vision, no mouth ulcers, no sicca symptoms, no scalp tenderness, no jaw claudication.  CV:      no CP, PND, PATTERSON, orthopnea, no palpitations  PULM: no SOB, cough, hemoptysis, sputum or pleuritic pain  GI:        no dysphagia, no GERD, no hematemesis, no abdominal pain, nausea, vomiting, constipation, diarrhea, melanotic stools, hematochezia  :      no hematuria, dysuria  NEURO: no paresthesias, headaches, seizures  MUSCULOSKELETAL:  As above  PSYCH:   No increased insomnia, no increased anxiety, no increased depression    Past Medical/Surgical History:  Past Medical History:   Diagnosis Date    Arthritis     Atrial fibrillation     CKD (chronic kidney disease)     COPD (chronic obstructive pulmonary disease)     Diabetes mellitus, type 2     Gout     Heart block     Hypertension     Pancreatitis     Peripheral vascular disease      Past Surgical History:   Procedure Laterality Date    APPENDECTOMY      CARDIAC PACEMAKER PLACEMENT      ERCP N/A 2022    Procedure: ERCP (ENDOSCOPIC RETROGRADE CHOLANGIOPANCREATOGRAPHY);  Surgeon: Dale Luciano III, MD;  Location: [facility];  Service: Endoscopy;  Laterality: N/A;    EYE SURGERY      INGUINAL HERNIA REPAIR Left     LAPAROSCOPIC CHOLECYSTECTOMY WITH CHOLANGIOGRAPHY N/A 2022    Procedure: CHOLECYSTECTOMY, LAPAROSCOPIC, WITH CHOLANGIOGRAM;  Surgeon: Santa  TYRONE Cardona MD;  Location: [facility] OR;  Service: General;  Laterality: N/A;    UMBILICAL HERNIA REPAIR  7/11/2022    Procedure: REPAIR, HERNIA, UMBILICAL, AGE 5 YEARS OR OLDER;  Surgeon: Santa Cardona MD;  Location: [facility];  Service: General;;       Allergies:  Review of patient's allergies indicates:   Allergen Reactions    Statins-hmg-coa reductase inhibitors        Social/Family History:  Social History     Socioeconomic History    Marital status:    Tobacco Use    Smoking status: Former Smoker    Smokeless tobacco: Never Used   Substance and Sexual Activity    Alcohol use: No    Drug use: No     Family History   Problem Relation Age of Onset    Diabetes Mellitus Mother      FAMILY HISTORY: negative for Connective Tissue Disease      Medications:    Current Outpatient Medications:     allopurinoL (ZYLOPRIM) 100 MG tablet, TAKE 1 TABLET(100 MG) BY MOUTH EVERY DAY (Patient taking differently: Take 100 mg by mouth nightly.), Disp: 90 tablet, Rfl: 3    apixaban (ELIQUIS) 5 mg Tab, Take 1 tablet (5 mg total) by mouth 2 (two) times daily., Disp: 180 tablet, Rfl: 0    benazepril (LOTENSIN) 40 MG tablet, Take 40 mg by mouth with lunch. At noon, Disp: , Rfl:     bumetanide (BUMEX) 1 MG tablet, Take 1 mg by mouth every morning. Early am, at 0500, Disp: , Rfl:     hydrOXYchloroQUINE (PLAQUENIL) 200 mg tablet, TAKE 1 TABLET(200 MG) BY MOUTH EVERY DAY (Patient taking differently: Take 200 mg by mouth once daily.), Disp: 90 tablet, Rfl: 1    metoprolol succinate (TOPROL-XL) 50 MG 24 hr tablet, Take 50 mg by mouth once daily., Disp: , Rfl:     potassium chloride (KLOR-CON) 10 MEQ TbSR, Take 10 mEq by mouth once daily., Disp: , Rfl:     SYMBICORT 160-4.5 mcg/actuation HFAA, Inhale 1 puff into the lungs as needed., Disp: , Rfl: 0    terazosin (HYTRIN) 5 MG capsule, Take 5 mg by mouth every evening., Disp: , Rfl:     vitamin D (VITAMIN D3) 1000 units Tab, Take 5,000 Units by mouth., Disp: , Rfl:      ondansetron (ZOFRAN-ODT) 4 MG TbDL, Take 1 tablet (4 mg total) by mouth every 6 (six) hours as needed. (Patient not taking: No sig reported), Disp: 12 tablet, Rfl: 0      Objective:     Vitals:  Blood pressure 134/77, weight 104.3 kg (229 lb 14.4 oz).    Physical Examination:   GEN: wn/wd male in no apparent distress  SKIN: no rashes, no sclerodactyly, no Raynaud's, no periungual erythema, no digital tip ulcerations, no nailbed pitting  HEAD: no alopecia, no scalp tenderness, no temporal artery tenderness or induration.  EYES: no pallor, no icterus, PERRLA  ENT:  no thrush, no mucosal dryness or ulcerations, adequate oral hygiene & dentition.  NECK: supple x 6, no masses, no thyromegaly, no lymphadenopathy.  CV:   S1 and S2 regular, no murmurs, gallop or rubs  CHEST: Normal respiratory effort;  normal breath sounds/no adventitious sounds. No signs of consolidation.  ABD: non-tender and non-distended; soft; normal bowel sounds; no rebound/guarding or tenderness. No hepatosplenomegaly.  Musculoskeletal:  No evidence of active synovitis  EXTREM: no clubbing, cyanosis or edema. normal pulses.  NEURO:  grossly intact; motor/sensory WNL; no tremors  PSYCH:  normal mood, affect and behavior    Labs:   Lab Results   Component Value Date    WBC 5.23 07/13/2022    HGB 10.9 (L) 07/13/2022    HCT 32.1 (L) 07/13/2022    MCV 92 07/13/2022    PLT 85 (L) 07/13/2022   CMP@  Sodium   Date Value Ref Range Status   07/11/2022 138 136 - 145 mmol/L Final   05/18/2017 141 134 - 144 mmol/L      Potassium   Date Value Ref Range Status   07/11/2022 3.8 3.5 - 5.1 mmol/L Final     Chloride   Date Value Ref Range Status   07/11/2022 100 95 - 110 mmol/L Final   05/18/2017 103 98 - 110 mmol/L      CO2   Date Value Ref Range Status   07/11/2022 32 (H) 23 - 29 mmol/L Final     Glucose   Date Value Ref Range Status   07/11/2022 119 (H) 70 - 110 mg/dL Final   05/18/2017 99 70 - 99 mg/dL      BUN   Date Value Ref Range Status   07/11/2022 13 8 - 23  mg/dL Final     Creatinine   Date Value Ref Range Status   07/11/2022 0.8 0.5 - 1.4 mg/dL Final   05/18/2017 1.10 0.60 - 1.40 mg/dL      Calcium   Date Value Ref Range Status   07/11/2022 9.2 8.7 - 10.5 mg/dL Final     Total Protein   Date Value Ref Range Status   07/11/2022 7.2 6.0 - 8.4 g/dL Final     Albumin   Date Value Ref Range Status   07/11/2022 4.3 3.5 - 5.2 g/dL Final   05/18/2017 4.7 3.1 - 4.7 g/dL      Total Bilirubin   Date Value Ref Range Status   07/11/2022 2.3 (H) 0.1 - 1.0 mg/dL Final     Comment:     For infants and newborns, interpretation of results should be based  on gestational age, weight and in agreement with clinical  observations.    Premature Infant recommended reference ranges:  Up to 24 hours.............<8.0 mg/dL  Up to 48 hours............<12.0 mg/dL  3-5 days..................<15.0 mg/dL  6-29 days.................<15.0 mg/dL       Alkaline Phosphatase   Date Value Ref Range Status   07/11/2022 92 55 - 135 U/L Final     AST   Date Value Ref Range Status   07/11/2022 38 10 - 40 U/L Final     ALT   Date Value Ref Range Status   07/11/2022 58 (H) 10 - 44 U/L Final     CPK   Date Value Ref Range Status   05/23/2022 137 20 - 200 U/L Final     CRP   Date Value Ref Range Status   04/26/2022 0.16 <0.76 mg/dL Final     Rheumatoid Factor   Date Value Ref Range Status   01/18/2018 <14 <14 IU/mL Final     Uric Acid   Date Value Ref Range Status   04/26/2022 6.2 3.4 - 7.0 mg/dL Final   04/26/2022 6.2 3.4 - 7.0 mg/dL Final       Radiology/Diagnostic Studies:    None    Assessment/Discussion/Plan:   81 y.o. male with chronic inflammatory arthritis-stable on Plaquenil 200 mg daily  2) chronic gouty arthritis-stable on allopurinol 100 mg daily    PLAN:  He will continue his medications without change.  Blood testing was ordered and should be done before the end of this year.    RTC:  He will follow-up with his primary care physician in the new year        Electronically signed by Piero Veronica,  MD      Patient notified that this office will be closing December 22, 2022. They should begin looking for another rheumatologist as soon as possible.  A list with the names and contact details of rheumatologists in the surrounding area was provided.

## 2023-06-06 ENCOUNTER — TELEPHONE (OUTPATIENT)
Dept: RHEUMATOLOGY | Facility: CLINIC | Age: 83
End: 2023-06-06
Payer: MEDICARE

## 2023-06-06 NOTE — TELEPHONE ENCOUNTER
Gave patient this information also advised him to reach out to his PCP for refills if needed.   [facility] is not scheduling new patient appointments at this time. A referral is needed for an appointment. If you have a referral in system it will be processed, reviewed and if appropriate for rheumatology appt someone will reach out to schedule. You should reach out to  other [facility] locations for possible availability   Polina Steele [phone]  [place] [phone]  [place] [phone]

## 2023-06-06 NOTE — TELEPHONE ENCOUNTER
----- Message from Aixa Horton sent at 6/6/2023 11:24 AM CDT -----  Contact: Patient  Type:  Patient Needs Advice    Who Called:  Patient  What is this regarding?:  Pt would like to schedule with Dr Monk or Dr Rea, whoever he can schedule with to get his Rx refilled.  Best Call Back Number:  [phone]  Additional Information:  Please call the patient back at the phone number listed above to advise. Thank you!

## 2023-06-21 NOTE — PROGRESS NOTES
Subjective:      Patient ID: Kennedy Thomas is a 82 y.o. male.    Chief Complaint: Rheumatoid Arthritis and Gout    HPI    Rheumatologic History:   - Diagnosis/es:   - Gout initially diagnosed when he presented with podagra and midfoot pain and swelling   - Seronegative inflammatory arthritis  - Positive serologies: -  - Negative serologies: RF  - Infectious screening labs:  Negative hepatitis-B and C (07/2022)  - Imaging: -  - Previous Treatments: -   - Current Treatments:    - Gout:    - Allopurinol 100mg   - Seronegative inflammatory arthritis:     - HCQ 200mg daily  - Plaquenil eye exam: No HCQ toxicity (4/2023)  Interval History:   Currently, he reports pain and swelling in his right knee as well as instability, left shoulder pain, and pain in his hands. He also has some numbness and burning over the balls of his feet. This is the extent of his complaints at this time.     Objective:   BP (!) 153/71   Pulse 67   Ht 6' (1.829 m)   Wt 101 kg (222 lb 8.9 oz)   BMI 30.18 kg/m²   Physical Exam   Constitutional: normal appearance.   HENT:   Head: Normocephalic and atraumatic.   Cardiovascular: Normal rate, regular rhythm and normal heart sounds.   Musculoskeletal:      Comments: Cool effusion, right knee  Otherwise no synovitis, dactylitis, enthesitis   Neurological: He is alert.   Skin: Skin is warm and dry. No rash noted.   No skin thickening, telangiectasias, calcinosis, psoriasiform lesions, lupoid lesions       No data to display     Assessment:     1. Gout, unspecified cause, unspecified chronicity, unspecified site    2. Rheumatoid arthritis involving multiple sites with positive rheumatoid factor    3. Encounter for long-term (current) drug use    4. Long-term use of Plaquenil      This is an 82-year-old man with history of atrial fibrillation s/p pacemaker, CKD, COPD, pre-DM, HTN, 2 episodes of pancreatitis, peripheral vascular disease, gout on allopurinol 100 mg daily, and chronic inflammatory  arthritis on Plaquenil 200 mg daily who was last seen by Dr Veronica in 7/2022 and was doing well at that time. He has some mild pain and swelling in his right knee and degenerative changes on exam. I offered knee injection but he declined. Will continue current medication as is.     Plan:     Problem List Items Addressed This Visit    None  Visit Diagnoses       Gout, unspecified cause, unspecified chronicity, unspecified site    -  Primary    Relevant Orders    CBC Auto Differential    Comprehensive Metabolic Panel    Sedimentation rate    C-Reactive Protein    Cyclic Citrullinated Peptide Antibody, IgG    Uric Acid    X-ray Arthritis Survey    Rheumatoid arthritis involving multiple sites with positive rheumatoid factor        Relevant Orders    CBC Auto Differential    Comprehensive Metabolic Panel    Sedimentation rate    C-Reactive Protein    Cyclic Citrullinated Peptide Antibody, IgG    Uric Acid    X-ray Arthritis Survey    Encounter for long-term (current) drug use        Relevant Orders    CBC Auto Differential    Comprehensive Metabolic Panel    Sedimentation rate    C-Reactive Protein    Cyclic Citrullinated Peptide Antibody, IgG    Uric Acid    X-ray Arthritis Survey    Long-term use of Plaquenil              - Allopurinol 100mg daily  - Plaquenil 200mg daily  - CCP, uric acid  - CBC, CMP, ESR, CRP  - Plaquenil eye exam due 4/2024    Follow up in 4-5 months    40 minutes of total time spent on the encounter, which includes face to face time and non-face to face time preparing to see the patient (eg, review of tests), Obtaining and/or reviewing separately obtained history, Documenting clinical information in the electronic or other health record, Independently interpreting results (not separately reported) and communicating results to the patient/family/caregiver, or Care coordination (not separately reported).       Gracia Chow M.D.  Rheumatology Dept  [facility]    -

## 2023-06-22 ENCOUNTER — OFFICE VISIT (OUTPATIENT)
Dept: RHEUMATOLOGY | Facility: CLINIC | Age: 83
End: 2023-06-22
Payer: MEDICARE

## 2023-06-22 VITALS
WEIGHT: 222.56 LBS | DIASTOLIC BLOOD PRESSURE: 71 MMHG | HEIGHT: 72 IN | HEART RATE: 67 BPM | BODY MASS INDEX: 30.14 KG/M2 | SYSTOLIC BLOOD PRESSURE: 153 MMHG

## 2023-06-22 DIAGNOSIS — M05.79 RHEUMATOID ARTHRITIS INVOLVING MULTIPLE SITES WITH POSITIVE RHEUMATOID FACTOR: ICD-10-CM

## 2023-06-22 DIAGNOSIS — M10.9 GOUT, UNSPECIFIED CAUSE, UNSPECIFIED CHRONICITY, UNSPECIFIED SITE: Primary | ICD-10-CM

## 2023-06-22 DIAGNOSIS — Z79.899 ENCOUNTER FOR LONG-TERM (CURRENT) DRUG USE: ICD-10-CM

## 2023-06-22 DIAGNOSIS — Z79.899 LONG-TERM USE OF PLAQUENIL: ICD-10-CM

## 2023-06-22 PROCEDURE — 1101F PR PT FALLS ASSESS DOC 0-1 FALLS W/OUT INJ PAST YR: ICD-10-PCS | Mod: CPTII,S$GLB,, | Performed by: STUDENT IN AN ORGANIZED HEALTH CARE EDUCATION/TRAINING PROGRAM

## 2023-06-22 PROCEDURE — 3078F PR MOST RECENT DIASTOLIC BLOOD PRESSURE < 80 MM HG: ICD-10-PCS | Mod: CPTII,S$GLB,, | Performed by: STUDENT IN AN ORGANIZED HEALTH CARE EDUCATION/TRAINING PROGRAM

## 2023-06-22 PROCEDURE — 1159F PR MEDICATION LIST DOCUMENTED IN MEDICAL RECORD: ICD-10-PCS | Mod: CPTII,S$GLB,, | Performed by: STUDENT IN AN ORGANIZED HEALTH CARE EDUCATION/TRAINING PROGRAM

## 2023-06-22 PROCEDURE — 1160F RVW MEDS BY RX/DR IN RCRD: CPT | Mod: CPTII,S$GLB,, | Performed by: STUDENT IN AN ORGANIZED HEALTH CARE EDUCATION/TRAINING PROGRAM

## 2023-06-22 PROCEDURE — 99999 PR PBB SHADOW E&M-EST. PATIENT-LVL IV: CPT | Mod: PBBFAC,,, | Performed by: STUDENT IN AN ORGANIZED HEALTH CARE EDUCATION/TRAINING PROGRAM

## 2023-06-22 PROCEDURE — 3077F PR MOST RECENT SYSTOLIC BLOOD PRESSURE >= 140 MM HG: ICD-10-PCS | Mod: CPTII,S$GLB,, | Performed by: STUDENT IN AN ORGANIZED HEALTH CARE EDUCATION/TRAINING PROGRAM

## 2023-06-22 PROCEDURE — 99204 OFFICE O/P NEW MOD 45 MIN: CPT | Mod: S$GLB,,, | Performed by: STUDENT IN AN ORGANIZED HEALTH CARE EDUCATION/TRAINING PROGRAM

## 2023-06-22 PROCEDURE — 3288F PR FALLS RISK ASSESSMENT DOCUMENTED: ICD-10-PCS | Mod: CPTII,S$GLB,, | Performed by: STUDENT IN AN ORGANIZED HEALTH CARE EDUCATION/TRAINING PROGRAM

## 2023-06-22 PROCEDURE — 3078F DIAST BP <80 MM HG: CPT | Mod: CPTII,S$GLB,, | Performed by: STUDENT IN AN ORGANIZED HEALTH CARE EDUCATION/TRAINING PROGRAM

## 2023-06-22 PROCEDURE — 3288F FALL RISK ASSESSMENT DOCD: CPT | Mod: CPTII,S$GLB,, | Performed by: STUDENT IN AN ORGANIZED HEALTH CARE EDUCATION/TRAINING PROGRAM

## 2023-06-22 PROCEDURE — 1125F PR PAIN SEVERITY QUANTIFIED, PAIN PRESENT: ICD-10-PCS | Mod: CPTII,S$GLB,, | Performed by: STUDENT IN AN ORGANIZED HEALTH CARE EDUCATION/TRAINING PROGRAM

## 2023-06-22 PROCEDURE — 99204 PR OFFICE/OUTPT VISIT, NEW, LEVL IV, 45-59 MIN: ICD-10-PCS | Mod: S$GLB,,, | Performed by: STUDENT IN AN ORGANIZED HEALTH CARE EDUCATION/TRAINING PROGRAM

## 2023-06-22 PROCEDURE — 1125F AMNT PAIN NOTED PAIN PRSNT: CPT | Mod: CPTII,S$GLB,, | Performed by: STUDENT IN AN ORGANIZED HEALTH CARE EDUCATION/TRAINING PROGRAM

## 2023-06-22 PROCEDURE — 3077F SYST BP >= 140 MM HG: CPT | Mod: CPTII,S$GLB,, | Performed by: STUDENT IN AN ORGANIZED HEALTH CARE EDUCATION/TRAINING PROGRAM

## 2023-06-22 PROCEDURE — 1160F PR REVIEW ALL MEDS BY PRESCRIBER/CLIN PHARMACIST DOCUMENTED: ICD-10-PCS | Mod: CPTII,S$GLB,, | Performed by: STUDENT IN AN ORGANIZED HEALTH CARE EDUCATION/TRAINING PROGRAM

## 2023-06-22 PROCEDURE — 1159F MED LIST DOCD IN RCRD: CPT | Mod: CPTII,S$GLB,, | Performed by: STUDENT IN AN ORGANIZED HEALTH CARE EDUCATION/TRAINING PROGRAM

## 2023-06-22 PROCEDURE — 99999 PR PBB SHADOW E&M-EST. PATIENT-LVL IV: ICD-10-PCS | Mod: PBBFAC,,, | Performed by: STUDENT IN AN ORGANIZED HEALTH CARE EDUCATION/TRAINING PROGRAM

## 2023-06-22 PROCEDURE — 1101F PT FALLS ASSESS-DOCD LE1/YR: CPT | Mod: CPTII,S$GLB,, | Performed by: STUDENT IN AN ORGANIZED HEALTH CARE EDUCATION/TRAINING PROGRAM

## 2023-06-22 RX ORDER — APIXABAN 5 MG/1
5 TABLET, FILM COATED ORAL 2 TIMES DAILY
COMMUNITY
Start: 2023-04-10

## 2023-06-22 ASSESSMENT — ROUTINE ASSESSMENT OF PATIENT INDEX DATA (RAPID3)
MDHAQ FUNCTION SCORE: 0.3
TOTAL RAPID3 SCORE: 2
PSYCHOLOGICAL DISTRESS SCORE: 1.1
FATIGUE SCORE: 1.1
PAIN SCORE: 3.5
PATIENT GLOBAL ASSESSMENT SCORE: 1.5

## 2023-06-27 ENCOUNTER — TELEPHONE (OUTPATIENT)
Dept: RHEUMATOLOGY | Facility: CLINIC | Age: 83
End: 2023-06-27
Payer: MEDICARE

## 2023-06-27 NOTE — TELEPHONE ENCOUNTER
Patient cancelled follow up appointment saying he decided he doesn't need rheumatology care. He denied any concerns or issues with patient care.

## 2023-06-27 NOTE — TELEPHONE ENCOUNTER
----- Message from Aixa Horotn sent at 6/27/2023  3:06 PM CDT -----  Contact: Patient  Type: Needs Medical Advice    Who Called:  Patient  What is this regarding?:  Pt wants to cancel his apt in 11/2023.  Best Call Back Number:  [phone]  Additional Information:  Please call the patient back at the phone number listed above to advise. Thank you!

## 2023-07-05 ENCOUNTER — TELEPHONE (OUTPATIENT)
Dept: RHEUMATOLOGY | Facility: CLINIC | Age: 83
End: 2023-07-05
Payer: MEDICARE

## 2023-07-05 NOTE — TELEPHONE ENCOUNTER
----- Message from Ni Alexander, Patient Care Assistant sent at 7/5/2023 10:31 AM CDT -----  Regarding: appointment  Contact: pt  Type:  Sooner Appointment Request    Caller is requesting a sooner appointment.  Caller declined first available appointment listed below.  Caller will not accept being placed on the waitlist and is requesting a message be sent to doctor.    Name of Caller:  pt    When is the first available appointment?  11/2/23    Symptoms:  5 month f/u     Best Call Back Number:  [phone]    Additional Information:  please call pt to advise. Thanks!

## 2023-07-06 ENCOUNTER — LAB VISIT (OUTPATIENT)
Dept: LAB | Facility: HOSPITAL | Age: 83
End: 2023-07-06
Attending: STUDENT IN AN ORGANIZED HEALTH CARE EDUCATION/TRAINING PROGRAM
Payer: MEDICARE

## 2023-07-06 DIAGNOSIS — M10.9 GOUT: Primary | ICD-10-CM

## 2023-07-06 DIAGNOSIS — F19.90 DRUG USE: ICD-10-CM

## 2023-07-06 DIAGNOSIS — M06.9 RHEUMATOID ARTHRITIS: ICD-10-CM

## 2023-07-06 LAB
ALBUMIN SERPL BCP-MCNC: 4 G/DL (ref 3.5–5.2)
ALP SERPL-CCNC: 59 U/L (ref 55–135)
ALT SERPL W/O P-5'-P-CCNC: 15 U/L (ref 10–44)
ANION GAP SERPL CALC-SCNC: 7 MMOL/L (ref 8–16)
AST SERPL-CCNC: 18 U/L (ref 10–40)
BASOPHILS # BLD AUTO: 0.05 K/UL (ref 0–0.2)
BASOPHILS NFR BLD: 1 % (ref 0–1.9)
BILIRUB SERPL-MCNC: 1.1 MG/DL (ref 0.1–1)
BUN SERPL-MCNC: 18 MG/DL (ref 8–23)
CALCIUM SERPL-MCNC: 9.5 MG/DL (ref 8.7–10.5)
CCP AB SER IA-ACNC: 1.1 U/ML
CHLORIDE SERPL-SCNC: 107 MMOL/L (ref 95–110)
CO2 SERPL-SCNC: 28 MMOL/L (ref 23–29)
CREAT SERPL-MCNC: 0.8 MG/DL (ref 0.5–1.4)
CRP SERPL-MCNC: 0.9 MG/L (ref 0–8.2)
DIFFERENTIAL METHOD: ABNORMAL
EOSINOPHIL # BLD AUTO: 0.1 K/UL (ref 0–0.5)
EOSINOPHIL NFR BLD: 2.2 % (ref 0–8)
ERYTHROCYTE [DISTWIDTH] IN BLOOD BY AUTOMATED COUNT: 12.9 % (ref 11.5–14.5)
ERYTHROCYTE [SEDIMENTATION RATE] IN BLOOD BY WESTERGREN METHOD: 6 MM/HR (ref 0–10)
EST. GFR  (NO RACE VARIABLE): >60 ML/MIN/1.73 M^2
GLUCOSE SERPL-MCNC: 113 MG/DL (ref 70–110)
HCT VFR BLD AUTO: 37.8 % (ref 40–54)
HGB BLD-MCNC: 13 G/DL (ref 14–18)
IMM GRANULOCYTES # BLD AUTO: 0.01 K/UL (ref 0–0.04)
IMM GRANULOCYTES NFR BLD AUTO: 0.2 % (ref 0–0.5)
LYMPHOCYTES # BLD AUTO: 1.3 K/UL (ref 1–4.8)
LYMPHOCYTES NFR BLD: 26.2 % (ref 18–48)
MCH RBC QN AUTO: 32.3 PG (ref 27–31)
MCHC RBC AUTO-ENTMCNC: 34.4 G/DL (ref 32–36)
MCV RBC AUTO: 94 FL (ref 82–98)
MONOCYTES # BLD AUTO: 0.5 K/UL (ref 0.3–1)
MONOCYTES NFR BLD: 9.6 % (ref 4–15)
NEUTROPHILS # BLD AUTO: 3.1 K/UL (ref 1.8–7.7)
NEUTROPHILS NFR BLD: 60.8 % (ref 38–73)
NRBC BLD-RTO: 0 /100 WBC
PLATELET # BLD AUTO: 103 K/UL (ref 150–450)
PMV BLD AUTO: 12.1 FL (ref 9.2–12.9)
POTASSIUM SERPL-SCNC: 4.1 MMOL/L (ref 3.5–5.1)
PROT SERPL-MCNC: 6.8 G/DL (ref 6–8.4)
RBC # BLD AUTO: 4.03 M/UL (ref 4.6–6.2)
SODIUM SERPL-SCNC: 142 MMOL/L (ref 136–145)
URATE SERPL-MCNC: 5.2 MG/DL (ref 3.4–7)
WBC # BLD AUTO: 5.11 K/UL (ref 3.9–12.7)

## 2023-07-06 PROCEDURE — 36415 COLL VENOUS BLD VENIPUNCTURE: CPT | Mod: PO | Performed by: STUDENT IN AN ORGANIZED HEALTH CARE EDUCATION/TRAINING PROGRAM

## 2023-07-06 PROCEDURE — 86140 C-REACTIVE PROTEIN: CPT | Performed by: STUDENT IN AN ORGANIZED HEALTH CARE EDUCATION/TRAINING PROGRAM

## 2023-07-06 PROCEDURE — 86200 CCP ANTIBODY: CPT | Performed by: STUDENT IN AN ORGANIZED HEALTH CARE EDUCATION/TRAINING PROGRAM

## 2023-07-06 PROCEDURE — 85651 RBC SED RATE NONAUTOMATED: CPT | Mod: PO | Performed by: STUDENT IN AN ORGANIZED HEALTH CARE EDUCATION/TRAINING PROGRAM

## 2023-07-06 PROCEDURE — 85025 COMPLETE CBC W/AUTO DIFF WBC: CPT | Performed by: STUDENT IN AN ORGANIZED HEALTH CARE EDUCATION/TRAINING PROGRAM

## 2023-07-06 PROCEDURE — 84550 ASSAY OF BLOOD/URIC ACID: CPT | Performed by: STUDENT IN AN ORGANIZED HEALTH CARE EDUCATION/TRAINING PROGRAM

## 2023-07-06 PROCEDURE — 80053 COMPREHEN METABOLIC PANEL: CPT | Performed by: STUDENT IN AN ORGANIZED HEALTH CARE EDUCATION/TRAINING PROGRAM

## 2023-11-01 NOTE — PROGRESS NOTES
Subjective:      Patient ID: Kennedy Thomas is a 82 y.o. male.    Chief Complaint: Disease Management    HPI    Rheumatologic History:   - Diagnosis/es:              - Gout initially diagnosed when he presented with podagra and midfoot pain and swelling              - Seronegative inflammatory arthritis  - Positive serologies: -  - Negative serologies: RF, CCP  - Infectious screening labs:  Negative hepatitis-B and C (07/2022)  - Imaging: -  - Previous Treatments: -   - Current Treatments:               - Gout:                          - Allopurinol 100mg              - Seronegative inflammatory arthritis:                           - HCQ 200mg daily  - Plaquenil eye exam: No HCQ toxicity (4/2023)  Interval History:   He is doing well and denies joint pain, swelling, and medication adverse effects.     Objective:   BP (!) 166/90   Pulse 79   Ht 6' (1.829 m)   Wt 101 kg (222 lb 10.6 oz)   BMI 30.20 kg/m²   Physical Exam   Constitutional: normal appearance.   HENT:   Head: Normocephalic and atraumatic.   Cardiovascular: Normal rate, regular rhythm and normal heart sounds.   Pulmonary/Chest: Effort normal and breath sounds normal.   Musculoskeletal:      Comments: Mild degenerative changes  No synovitis, dactylitis, enthesitis, effusions     Neurological: He is alert.   Skin: Skin is warm and dry. No rash noted.   No skin thickening, telangiectasias, calcinosis, psoriasiform lesions, lupoid lesions         No data to display  Labs independently reviewed by me (07/06/2023)  CBC Hgb 13, Plt 103, otherwise WNL   CMP Cr, AST, ALT WNL  ESR and CRP WNL   Uric acid 5.2    Assessment:     1. Rheumatoid arthritis involving multiple sites with positive rheumatoid factor    2. Gout, unspecified cause, unspecified chronicity, unspecified site    3. Encounter for long-term (current) drug use    4. Long-term use of Plaquenil      This is an 82-year-old man with history of atrial fibrillation s/p pacemaker, CKD, COPD, pre-DM,  HTN, 2 episodes of pancreatitis, peripheral vascular disease, gout on allopurinol 100 mg daily, and chronic inflammatory arthritis on Plaquenil 200 mg daily. He is doing well and denies joint pain, swelling, and medication adverse effects. Continue current medication.     Plan:     Problem List Items Addressed This Visit    None  Visit Diagnoses       Rheumatoid arthritis involving multiple sites with positive rheumatoid factor    -  Primary    Relevant Orders    CBC Auto Differential    Comprehensive Metabolic Panel    Sedimentation rate    C-Reactive Protein    Uric Acid    Gout, unspecified cause, unspecified chronicity, unspecified site        Relevant Orders    CBC Auto Differential    Comprehensive Metabolic Panel    Sedimentation rate    C-Reactive Protein    Uric Acid    Encounter for long-term (current) drug use        Relevant Orders    CBC Auto Differential    Comprehensive Metabolic Panel    Sedimentation rate    C-Reactive Protein    Uric Acid    Long-term use of Plaquenil        Relevant Orders    CBC Auto Differential    Comprehensive Metabolic Panel    Sedimentation rate    C-Reactive Protein    Uric Acid          - Allopurinol 100mg daily  - Plaquenil 200mg daily  - uric acid before follow up  - CBC, CMP before follow up  - Plaquenil eye exam due 4/2024    Follow up in 6 months    30 minutes of total time spent on the encounter, which includes face to face time and non-face to face time preparing to see the patient (eg, review of tests), Obtaining and/or reviewing separately obtained history, Documenting clinical information in the electronic or other health record, Independently interpreting results (not separately reported) and communicating results to the patient/family/caregiver, or Care coordination (not separately reported).     This note was prepared with [employer] Direct voice recognition transcription software. Garbled syntax, mangled pronouns, and other bizarre constructions may be  attributed to that software system       Gracia Chow M.D.  Rheumatology Dept  [place], [place]

## 2023-11-02 ENCOUNTER — OFFICE VISIT (OUTPATIENT)
Dept: RHEUMATOLOGY | Facility: CLINIC | Age: 83
End: 2023-11-02
Payer: MEDICARE

## 2023-11-02 VITALS
HEART RATE: 79 BPM | SYSTOLIC BLOOD PRESSURE: 166 MMHG | BODY MASS INDEX: 30.16 KG/M2 | HEIGHT: 72 IN | DIASTOLIC BLOOD PRESSURE: 90 MMHG | WEIGHT: 222.69 LBS

## 2023-11-02 DIAGNOSIS — M05.79 RHEUMATOID ARTHRITIS INVOLVING MULTIPLE SITES WITH POSITIVE RHEUMATOID FACTOR: Primary | ICD-10-CM

## 2023-11-02 DIAGNOSIS — M10.9 GOUT, UNSPECIFIED CAUSE, UNSPECIFIED CHRONICITY, UNSPECIFIED SITE: ICD-10-CM

## 2023-11-02 DIAGNOSIS — Z79.899 LONG-TERM USE OF PLAQUENIL: ICD-10-CM

## 2023-11-02 DIAGNOSIS — Z79.899 ENCOUNTER FOR LONG-TERM (CURRENT) DRUG USE: ICD-10-CM

## 2023-11-02 PROCEDURE — 1160F RVW MEDS BY RX/DR IN RCRD: CPT | Mod: CPTII,S$GLB,, | Performed by: STUDENT IN AN ORGANIZED HEALTH CARE EDUCATION/TRAINING PROGRAM

## 2023-11-02 PROCEDURE — 99999 PR PBB SHADOW E&M-EST. PATIENT-LVL III: CPT | Mod: PBBFAC,,, | Performed by: STUDENT IN AN ORGANIZED HEALTH CARE EDUCATION/TRAINING PROGRAM

## 2023-11-02 PROCEDURE — 1125F AMNT PAIN NOTED PAIN PRSNT: CPT | Mod: CPTII,S$GLB,, | Performed by: STUDENT IN AN ORGANIZED HEALTH CARE EDUCATION/TRAINING PROGRAM

## 2023-11-02 PROCEDURE — 1125F PR PAIN SEVERITY QUANTIFIED, PAIN PRESENT: ICD-10-PCS | Mod: CPTII,S$GLB,, | Performed by: STUDENT IN AN ORGANIZED HEALTH CARE EDUCATION/TRAINING PROGRAM

## 2023-11-02 PROCEDURE — 99214 OFFICE O/P EST MOD 30 MIN: CPT | Mod: S$GLB,,, | Performed by: STUDENT IN AN ORGANIZED HEALTH CARE EDUCATION/TRAINING PROGRAM

## 2023-11-02 PROCEDURE — 1159F MED LIST DOCD IN RCRD: CPT | Mod: CPTII,S$GLB,, | Performed by: STUDENT IN AN ORGANIZED HEALTH CARE EDUCATION/TRAINING PROGRAM

## 2023-11-02 PROCEDURE — 99999 PR PBB SHADOW E&M-EST. PATIENT-LVL III: ICD-10-PCS | Mod: PBBFAC,,, | Performed by: STUDENT IN AN ORGANIZED HEALTH CARE EDUCATION/TRAINING PROGRAM

## 2023-11-02 PROCEDURE — 3077F SYST BP >= 140 MM HG: CPT | Mod: CPTII,S$GLB,, | Performed by: STUDENT IN AN ORGANIZED HEALTH CARE EDUCATION/TRAINING PROGRAM

## 2023-11-02 PROCEDURE — 1101F PR PT FALLS ASSESS DOC 0-1 FALLS W/OUT INJ PAST YR: ICD-10-PCS | Mod: CPTII,S$GLB,, | Performed by: STUDENT IN AN ORGANIZED HEALTH CARE EDUCATION/TRAINING PROGRAM

## 2023-11-02 PROCEDURE — 3288F FALL RISK ASSESSMENT DOCD: CPT | Mod: CPTII,S$GLB,, | Performed by: STUDENT IN AN ORGANIZED HEALTH CARE EDUCATION/TRAINING PROGRAM

## 2023-11-02 PROCEDURE — 1159F PR MEDICATION LIST DOCUMENTED IN MEDICAL RECORD: ICD-10-PCS | Mod: CPTII,S$GLB,, | Performed by: STUDENT IN AN ORGANIZED HEALTH CARE EDUCATION/TRAINING PROGRAM

## 2023-11-02 PROCEDURE — 1101F PT FALLS ASSESS-DOCD LE1/YR: CPT | Mod: CPTII,S$GLB,, | Performed by: STUDENT IN AN ORGANIZED HEALTH CARE EDUCATION/TRAINING PROGRAM

## 2023-11-02 PROCEDURE — 99214 PR OFFICE/OUTPT VISIT, EST, LEVL IV, 30-39 MIN: ICD-10-PCS | Mod: S$GLB,,, | Performed by: STUDENT IN AN ORGANIZED HEALTH CARE EDUCATION/TRAINING PROGRAM

## 2023-11-02 PROCEDURE — 3080F PR MOST RECENT DIASTOLIC BLOOD PRESSURE >= 90 MM HG: ICD-10-PCS | Mod: CPTII,S$GLB,, | Performed by: STUDENT IN AN ORGANIZED HEALTH CARE EDUCATION/TRAINING PROGRAM

## 2023-11-02 PROCEDURE — 3077F PR MOST RECENT SYSTOLIC BLOOD PRESSURE >= 140 MM HG: ICD-10-PCS | Mod: CPTII,S$GLB,, | Performed by: STUDENT IN AN ORGANIZED HEALTH CARE EDUCATION/TRAINING PROGRAM

## 2023-11-02 PROCEDURE — 1160F PR REVIEW ALL MEDS BY PRESCRIBER/CLIN PHARMACIST DOCUMENTED: ICD-10-PCS | Mod: CPTII,S$GLB,, | Performed by: STUDENT IN AN ORGANIZED HEALTH CARE EDUCATION/TRAINING PROGRAM

## 2023-11-02 PROCEDURE — 3288F PR FALLS RISK ASSESSMENT DOCUMENTED: ICD-10-PCS | Mod: CPTII,S$GLB,, | Performed by: STUDENT IN AN ORGANIZED HEALTH CARE EDUCATION/TRAINING PROGRAM

## 2023-11-02 PROCEDURE — 3080F DIAST BP >= 90 MM HG: CPT | Mod: CPTII,S$GLB,, | Performed by: STUDENT IN AN ORGANIZED HEALTH CARE EDUCATION/TRAINING PROGRAM

## 2023-11-02 ASSESSMENT — ROUTINE ASSESSMENT OF PATIENT INDEX DATA (RAPID3)
MDHAQ FUNCTION SCORE: 0
PAIN SCORE: 3
FATIGUE SCORE: 0
PSYCHOLOGICAL DISTRESS SCORE: 0
PATIENT GLOBAL ASSESSMENT SCORE: 3
TOTAL RAPID3 SCORE: 2

## 2024-02-27 ENCOUNTER — TELEPHONE (OUTPATIENT)
Dept: RHEUMATOLOGY | Facility: CLINIC | Age: 84
End: 2024-02-27
Payer: MEDICARE

## 2024-02-27 NOTE — TELEPHONE ENCOUNTER
Records received and reviewed:  Lab results (02/19/2024)   CMP creatinine and LFTs WNL   Urinalysis WNL   CBC platelets 102 stable, WBC and hemoglobin WNL  Vitamin-D 72

## 2024-04-09 ENCOUNTER — OFFICE VISIT (OUTPATIENT)
Dept: CARDIOLOGY | Facility: CLINIC | Age: 84
End: 2024-04-09
Payer: MEDICARE

## 2024-04-09 VITALS
OXYGEN SATURATION: 97 % | BODY MASS INDEX: 29.02 KG/M2 | HEART RATE: 64 BPM | WEIGHT: 213.94 LBS | DIASTOLIC BLOOD PRESSURE: 73 MMHG | SYSTOLIC BLOOD PRESSURE: 128 MMHG

## 2024-04-09 DIAGNOSIS — M05.79 RHEUMATOID ARTHRITIS INVOLVING MULTIPLE SITES WITH POSITIVE RHEUMATOID FACTOR: ICD-10-CM

## 2024-04-09 DIAGNOSIS — I48.0 PAROXYSMAL ATRIAL FIBRILLATION: ICD-10-CM

## 2024-04-09 DIAGNOSIS — M19.90 CHRONIC INFLAMMATORY ARTHRITIS: ICD-10-CM

## 2024-04-09 DIAGNOSIS — Z45.010 PACEMAKER GENERATOR END OF LIFE: ICD-10-CM

## 2024-04-09 DIAGNOSIS — G47.33 OSA (OBSTRUCTIVE SLEEP APNEA): ICD-10-CM

## 2024-04-09 DIAGNOSIS — Z79.01 CHRONIC ANTICOAGULATION: ICD-10-CM

## 2024-04-09 DIAGNOSIS — Z95.0 CARDIAC PACEMAKER IN SITU: ICD-10-CM

## 2024-04-09 DIAGNOSIS — Z86.79 STATUS POST MAZE OPERATION FOR ATRIAL FIBRILLATION: ICD-10-CM

## 2024-04-09 DIAGNOSIS — Z98.890 STATUS POST MAZE OPERATION FOR ATRIAL FIBRILLATION: ICD-10-CM

## 2024-04-09 DIAGNOSIS — Z78.9 STATIN INTOLERANCE: ICD-10-CM

## 2024-04-09 DIAGNOSIS — Z98.890 H/O CARDIAC RADIOFREQUENCY ABLATION: ICD-10-CM

## 2024-04-09 DIAGNOSIS — I25.110 ATHEROSCLEROSIS OF NATIVE CORONARY ARTERY OF NATIVE HEART WITH UNSTABLE ANGINA PECTORIS: Primary | ICD-10-CM

## 2024-04-09 PROCEDURE — 99999 PR PBB SHADOW E&M-EST. PATIENT-LVL III: CPT | Mod: PBBFAC,,, | Performed by: GENERAL PRACTICE

## 2024-04-09 PROCEDURE — 1159F MED LIST DOCD IN RCRD: CPT | Mod: CPTII,S$GLB,, | Performed by: GENERAL PRACTICE

## 2024-04-09 PROCEDURE — 93000 ELECTROCARDIOGRAM COMPLETE: CPT | Mod: S$GLB,,, | Performed by: GENERAL PRACTICE

## 2024-04-09 PROCEDURE — 3074F SYST BP LT 130 MM HG: CPT | Mod: CPTII,S$GLB,, | Performed by: GENERAL PRACTICE

## 2024-04-09 PROCEDURE — 3078F DIAST BP <80 MM HG: CPT | Mod: CPTII,S$GLB,, | Performed by: GENERAL PRACTICE

## 2024-04-09 PROCEDURE — 3288F FALL RISK ASSESSMENT DOCD: CPT | Mod: CPTII,S$GLB,, | Performed by: GENERAL PRACTICE

## 2024-04-09 PROCEDURE — 1160F RVW MEDS BY RX/DR IN RCRD: CPT | Mod: CPTII,S$GLB,, | Performed by: GENERAL PRACTICE

## 2024-04-09 PROCEDURE — 1101F PT FALLS ASSESS-DOCD LE1/YR: CPT | Mod: CPTII,S$GLB,, | Performed by: GENERAL PRACTICE

## 2024-04-09 PROCEDURE — 99204 OFFICE O/P NEW MOD 45 MIN: CPT | Mod: S$GLB,,, | Performed by: GENERAL PRACTICE

## 2024-04-09 RX ORDER — HYDROXYCHLOROQUINE SULFATE 200 MG/1
100 TABLET, FILM COATED ORAL DAILY
Qty: 90 TABLET | Refills: 1 | Status: SHIPPED | OUTPATIENT
Start: 2024-04-09

## 2024-04-09 RX ORDER — SODIUM CHLORIDE 0.9 % (FLUSH) 0.9 %
10 SYRINGE (ML) INJECTION
Status: DISCONTINUED | OUTPATIENT
Start: 2024-04-09 | End: 2024-04-29

## 2024-04-09 RX ORDER — SPIRONOLACTONE 25 MG/1
25 TABLET ORAL
COMMUNITY
Start: 2024-03-07

## 2024-04-09 RX ORDER — TORSEMIDE 20 MG/1
20 TABLET ORAL DAILY
COMMUNITY
Start: 2024-03-07

## 2024-04-09 RX ORDER — VANCOMYCIN/0.9 % SOD CHLORIDE 1 G/100 ML
1000 PLASTIC BAG, INJECTION (ML) INTRAVENOUS
Status: CANCELLED | OUTPATIENT
Start: 2024-04-09

## 2024-04-09 RX ORDER — SODIUM CHLORIDE 9 MG/ML
INJECTION, SOLUTION INTRAVENOUS CONTINUOUS
Status: CANCELLED | OUTPATIENT
Start: 2024-04-09

## 2024-04-09 NOTE — PROGRESS NOTES
Subjective:    Patient ID:  Kennedy Thomas is a 83 y.o. male who presents for evaluation of   Chief Complaint   Patient presents with    Establish Care    Atrial Fibrillation       HPI:    HE IS REFERRED BY DR. ELY CASTRO FOR GEN CHANGE  He has a history of a permanent pacemaker placed originally by Dr. CARRINGTON.  He has had for generator changes since that time last stone being about 9 years ago when he had traumatic injury from a 2 x 4 with a hematoma and seroma he had a previous episode were had a traumatic injury to the wire and the wires were replaced.  His atrial fibrillation and history of 4 vessel pulmonary vein eyes ablation.  He is generally 100% paced in the atrium and ventricle.  He had a convergent Maze procedure.  Previous echo revealed the dilated left ventricle EF of 50% pulmonary artery systolic pressure estimated at 62 mmHg had a Lexiscan Myoview stress test negative for ischemia his heart failure mildly decreased ejection fraction is on goal-directed medical therapy.  He had marked dyspnea on exertion before the goal-directed therapy was started now he feels like he can walk a significant distance without dyspnea on exertion He is on chronic anticoagulation therapy with Eliquis.  Is sleep apnea can not tolerate CPAP and uses oxygen 2 L.    EKG SHOWS ATRIAL FIBRILLATION AND VENTRICULAR PACED RHYTHM  Review of patient's allergies indicates:   Allergen Reactions    Statins-hmg-coa reductase inhibitors        Past Medical History:   Diagnosis Date    Arthritis     Atrial fibrillation     CKD (chronic kidney disease)     COPD (chronic obstructive pulmonary disease)     Diabetes mellitus, type 2     Gout     Heart block     Hypertension     Pancreatitis     Peripheral vascular disease      Past Surgical History:   Procedure Laterality Date    APPENDECTOMY      CARDIAC PACEMAKER PLACEMENT  2010    ERCP N/A 7/12/2022    Procedure: ERCP (ENDOSCOPIC RETROGRADE CHOLANGIOPANCREATOGRAPHY);  Surgeon: Dale  KERRY Luciano III, MD;  Location: [facility] ENDO;  Service: Endoscopy;  Laterality: N/A;    EYE SURGERY      INGUINAL HERNIA REPAIR Left     LAPAROSCOPIC CHOLECYSTECTOMY WITH CHOLANGIOGRAPHY N/A 7/11/2022    Procedure: CHOLECYSTECTOMY, LAPAROSCOPIC, WITH CHOLANGIOGRAM;  Surgeon: Santa Cardona MD;  Location: [facility] OR;  Service: General;  Laterality: N/A;    UMBILICAL HERNIA REPAIR  7/11/2022    Procedure: REPAIR, HERNIA, UMBILICAL, AGE 5 YEARS OR OLDER;  Surgeon: Santa Cardona MD;  Location: [facility] OR;  Service: General;;     Social History     Tobacco Use    Smoking status: Former    Smokeless tobacco: Never   Substance Use Topics    Alcohol use: No    Drug use: No     Family History   Problem Relation Age of Onset    Diabetes Mellitus Mother         Review of Systems:   Constitution: Negative for diaphoresis and fever.   HEENT: Negative for nosebleeds.    Cardiovascular: Negative for chest pain       No dyspnea on exertion       No leg swelling        No palpitations  Respiratory: Negative for shortness of breath and wheezing.    Hematologic/Lymphatic: Negative for bleeding problem. Does not bruise/bleed easily.   Skin: Negative for color change and rash.   Musculoskeletal: Negative for falls and myalgias.   Gastrointestinal: Negative for hematemesis and hematochezia.   Genitourinary: Negative for hematuria.   Neurological: Negative for dizziness and light-headedness.   Psychiatric/Behavioral: Negative for altered mental status and memory loss.          Objective:        Vitals:    04/09/24 0947   BP: 128/73   Pulse: 64       Lab Results   Component Value Date    WBC 5.11 07/06/2023    HGB 13.0 (L) 07/06/2023    HCT 37.8 (L) 07/06/2023     (L) 07/06/2023    ALT 15 07/06/2023    AST 18 07/06/2023     07/06/2023    K 4.1 07/06/2023     07/06/2023    CREATININE 0.8 07/06/2023    BUN 18 07/06/2023    CO2 28 07/06/2023    INR 1.6 07/13/2022    INR 1.6 07/13/2022        ECHOCARDIOGRAM RESULTS  No  results found for this or any previous visit.        CURRENT/PREVIOUS VISIT EKG  Results for orders placed or performed during the hospital encounter of 07/07/22   EKG 12-lead    Collection Time: 07/08/22  3:35 AM    Narrative    Test Reason : R10.13,    Vent. Rate : 068 BPM     Atrial Rate : 068 BPM     P-R Int : 196 ms          QRS Dur : 158 ms      QT Int : 458 ms       P-R-T Axes : 000 -64 143 degrees     QTc Int : 487 ms    AV dual-paced rhythm  Abnormal ECG  When compared with ECG of 07-APR-2022 16:25,  Vent. rate has increased BY   5 BPM  Confirmed by Raghu Heck MD ([id number]) on 7/13/2022 5:00:15 PM    Referred By: PATEL   SELF           Confirmed By:Raghu Heck MD     No valid procedures specified.   Results for orders placed during the hospital encounter of 04/07/22    Nuclear Stress Test    Interpretation Summary    The nuclear portion of this study will be reported separately.    The EKG portion of this study is uninterpretable.    The patient reported no chest pain during the stress test.    During stress, rare PVCs are noted.    Ventricular paced rhythm      Physical Exam:  CONSTITUTIONAL: No fever, no chills  HEENT: Normocephalic, atraumatic,pupils reactive to light                 NECK:  No JVD no carotid bruit  CVS: S1S2+, RRR, no murmurs,   LUNGS: Clear PACEMAKER SITE LEFT SUBCLAVICULAR AREA IS CLEANED WELL HEALED  ABDOMEN: Soft, NT, BS+  EXTREMITIES: No cyanosis, edema  : No alves catheter  NEURO: AAO X 3  PSY: Normal affect      Medication List with Changes/Refills   Current Medications    ALLOPURINOL (ZYLOPRIM) 100 MG TABLET    TAKE 1 TABLET(100 MG) BY MOUTH EVERY DAY    BENAZEPRIL (LOTENSIN) 40 MG TABLET    Take 40 mg by mouth with lunch. At noon    ELIQUIS 5 MG TAB    Take 5 mg by mouth 2 (two) times daily.    METOPROLOL SUCCINATE (TOPROL-XL) 50 MG 24 HR TABLET    Take 50 mg by mouth once daily.    ONDANSETRON (ZOFRAN-ODT) 4 MG TBDL    Take 1 tablet (4 mg total) by mouth every 6  (six) hours as needed.    POTASSIUM CHLORIDE (KLOR-CON) 10 MEQ TBSR    Take 10 mEq by mouth once daily.    SPIRONOLACTONE (ALDACTONE) 25 MG TABLET    Take 25 mg by mouth.    SYMBICORT 160-4.5 MCG/ACTUATION HFAA    Inhale 1 puff into the lungs as needed.    TERAZOSIN (HYTRIN) 5 MG CAPSULE    Take 5 mg by mouth every evening.    TORSEMIDE (DEMADEX) 20 MG TAB    Take 20 mg by mouth once daily.    VITAMIN D (VITAMIN D3) 1000 UNITS TAB    Take 5,000 Units by mouth.   Changed and/or Refilled Medications    Modified Medication Previous Medication    HYDROXYCHLOROQUINE (PLAQUENIL) 200 MG TABLET hydrOXYchloroQUINE (PLAQUENIL) 200 mg tablet       Take 0.5 tablets (100 mg total) by mouth once daily.    TAKE 1 TABLET(200 MG) BY MOUTH EVERY DAY   Discontinued Medications    BUMETANIDE (BUMEX) 1 MG TABLET    Take 1 mg by mouth every morning. Early am, at 0500             Assessment:       1. Atherosclerosis of native coronary artery of native heart with unstable angina pectoris    2. Paroxysmal atrial fibrillation    3. Cardiac pacemaker in situ    4. Statin intolerance    5. Chronic inflammatory arthritis    6. Rheumatoid arthritis involving multiple sites with positive rheumatoid factor    7. Chronic anticoagulation    8. H/O cardiac radiofrequency ablation    9. BILL (obstructive sleep apnea)    10. Status post Maze operation for atrial fibrillation    11. Pacemaker generator end of life         Plan:     Problem List Items Addressed This Visit    None  Visit Diagnoses       Atherosclerosis of native coronary artery of native heart with unstable angina pectoris    -  Primary    Paroxysmal atrial fibrillation        Relevant Orders    IN OFFICE EKG 12-LEAD (to Muse)    Cardiac pacemaker in situ        Relevant Orders    IN OFFICE EKG 12-LEAD (to Muse)    Statin intolerance        Chronic inflammatory arthritis        Relevant Medications    hydroxychloroquine (PLAQUENIL) 200 mg tablet    Rheumatoid arthritis involving multiple  sites with positive rheumatoid factor        Relevant Medications    hydroxychloroquine (PLAQUENIL) 200 mg tablet    Chronic anticoagulation        H/O cardiac radiofrequency ablation        BILL (obstructive sleep apnea)        Status post Maze operation for atrial fibrillation        Pacemaker generator end of life                PACEMAKER GENERATOR NEARING OMAIRA.  WILL OBTAIN THE LATEST INTERROGATION.  AND WILL SCHEDULE THE GENERATOR CHANGE AT THE APPROPRIATE TIME.  BIGGEST RISK IS THE RISK OF INFECTION AND ANTIBIOTIC COVERAGE WILL BE PROVIDED WITH A ANTIBIOTIC POUCH  RESTS HE HOLDS THE ELIQUIS FOR 3 DAYS PRIOR TO THE PROCEDURE TO PREVENT BLEEDING    HE % PACER DEPENDENT      INTERROGATION OF THE PACEMAKER TODAY  OMAIRA ON MARCH 10TH.  HAS UNDERLYING VENTRICULAR RATE 48 PER  IS CURRENTLY PACED VVI  Follow up in about 4 weeks (around 5/7/2024).    The patients questions were answered, they verbalized understanding, and agreed with the treatment plan.     JOJO FU MD  [facility]

## 2024-04-17 ENCOUNTER — HOSPITAL ENCOUNTER (OUTPATIENT)
Dept: RADIOLOGY | Facility: HOSPITAL | Age: 84
Discharge: HOME | End: 2024-04-17
Attending: GENERAL PRACTICE
Payer: MEDICARE

## 2024-04-17 ENCOUNTER — OFFICE VISIT (OUTPATIENT)
Dept: PULMONOLOGY | Facility: CLINIC | Age: 84
End: 2024-04-17
Payer: MEDICARE

## 2024-04-17 ENCOUNTER — HOSPITAL ENCOUNTER (OUTPATIENT)
Dept: PREADMISSION TESTING | Facility: HOSPITAL | Age: 84
Discharge: HOME | End: 2024-04-17
Attending: GENERAL PRACTICE
Payer: MEDICARE

## 2024-04-17 VITALS
WEIGHT: 212.63 LBS | OXYGEN SATURATION: 99 % | BODY MASS INDEX: 28.8 KG/M2 | TEMPERATURE: 98 F | DIASTOLIC BLOOD PRESSURE: 73 MMHG | HEIGHT: 72 IN | HEART RATE: 65 BPM | SYSTOLIC BLOOD PRESSURE: 132 MMHG | RESPIRATION RATE: 18 BRPM

## 2024-04-17 VITALS
HEIGHT: 72 IN | DIASTOLIC BLOOD PRESSURE: 80 MMHG | SYSTOLIC BLOOD PRESSURE: 130 MMHG | HEART RATE: 65 BPM | BODY MASS INDEX: 28.85 KG/M2 | OXYGEN SATURATION: 97 % | WEIGHT: 213 LBS

## 2024-04-17 DIAGNOSIS — I48.0 PAROXYSMAL ATRIAL FIBRILLATION: ICD-10-CM

## 2024-04-17 DIAGNOSIS — Z01.818 PREOPERATIVE CLEARANCE: ICD-10-CM

## 2024-04-17 DIAGNOSIS — R06.00 DYSPNEA, UNSPECIFIED TYPE: Primary | ICD-10-CM

## 2024-04-17 DIAGNOSIS — I25.110 ATHEROSCLEROSIS OF NATIVE CORONARY ARTERY OF NATIVE HEART WITH UNSTABLE ANGINA PECTORIS: ICD-10-CM

## 2024-04-17 DIAGNOSIS — Z01.818 PRE-OP TESTING: ICD-10-CM

## 2024-04-17 DIAGNOSIS — Z01.818 PREOP TESTING: Primary | ICD-10-CM

## 2024-04-17 DIAGNOSIS — Z95.0 CARDIAC PACEMAKER IN SITU: ICD-10-CM

## 2024-04-17 DIAGNOSIS — M06.9 RHEUMATOID ARTHRITIS, INVOLVING UNSPECIFIED SITE, UNSPECIFIED WHETHER RHEUMATOID FACTOR PRESENT: ICD-10-CM

## 2024-04-17 LAB
ALBUMIN SERPL BCP-MCNC: 4.2 G/DL (ref 3.5–5.2)
ALP SERPL-CCNC: 64 U/L (ref 55–135)
ALT SERPL W/O P-5'-P-CCNC: 12 U/L (ref 10–44)
ANION GAP SERPL CALC-SCNC: 3 MMOL/L (ref 8–16)
AST SERPL-CCNC: 15 U/L (ref 10–40)
BASOPHILS # BLD AUTO: 0.08 K/UL (ref 0–0.2)
BASOPHILS NFR BLD: 1.2 % (ref 0–1.9)
BILIRUB SERPL-MCNC: 1 MG/DL (ref 0.1–1)
BUN SERPL-MCNC: 33 MG/DL (ref 8–23)
CALCIUM SERPL-MCNC: 9.7 MG/DL (ref 8.7–10.5)
CHLORIDE SERPL-SCNC: 101 MMOL/L (ref 95–110)
CO2 SERPL-SCNC: 33 MMOL/L (ref 23–29)
CREAT SERPL-MCNC: 1.3 MG/DL (ref 0.5–1.4)
DIFFERENTIAL METHOD BLD: ABNORMAL
EOSINOPHIL # BLD AUTO: 0.2 K/UL (ref 0–0.5)
EOSINOPHIL NFR BLD: 2.6 % (ref 0–8)
ERYTHROCYTE [DISTWIDTH] IN BLOOD BY AUTOMATED COUNT: 12.6 % (ref 11.5–14.5)
EST. GFR  (NO RACE VARIABLE): 55.2 ML/MIN/1.73 M^2
GLUCOSE SERPL-MCNC: 102 MG/DL (ref 70–110)
HCT VFR BLD AUTO: 37.9 % (ref 40–54)
HGB BLD-MCNC: 12.8 G/DL (ref 14–18)
IMM GRANULOCYTES # BLD AUTO: 0.02 K/UL (ref 0–0.04)
IMM GRANULOCYTES NFR BLD AUTO: 0.3 % (ref 0–0.5)
LYMPHOCYTES # BLD AUTO: 1.8 K/UL (ref 1–4.8)
LYMPHOCYTES NFR BLD: 27.2 % (ref 18–48)
MCH RBC QN AUTO: 32.1 PG (ref 27–31)
MCHC RBC AUTO-ENTMCNC: 33.8 G/DL (ref 32–36)
MCV RBC AUTO: 95 FL (ref 82–98)
MONOCYTES # BLD AUTO: 0.6 K/UL (ref 0.3–1)
MONOCYTES NFR BLD: 9.5 % (ref 4–15)
MRSA SCREEN BY PCR: NEGATIVE
NEUTROPHILS # BLD AUTO: 3.8 K/UL (ref 1.8–7.7)
NEUTROPHILS NFR BLD: 59.2 % (ref 38–73)
NRBC BLD-RTO: 0 /100 WBC
PLATELET # BLD AUTO: 111 K/UL (ref 150–450)
PMV BLD AUTO: 10.4 FL (ref 9.2–12.9)
POTASSIUM SERPL-SCNC: 4.3 MMOL/L (ref 3.5–5.1)
PROT SERPL-MCNC: 7.1 G/DL (ref 6–8.4)
RBC # BLD AUTO: 3.99 M/UL (ref 4.6–6.2)
SODIUM SERPL-SCNC: 137 MMOL/L (ref 136–145)
WBC # BLD AUTO: 6.5 K/UL (ref 3.9–12.7)

## 2024-04-17 PROCEDURE — 85025 COMPLETE CBC W/AUTO DIFF WBC: CPT | Performed by: GENERAL PRACTICE

## 2024-04-17 PROCEDURE — 1159F MED LIST DOCD IN RCRD: CPT | Mod: CPTII,S$GLB,, | Performed by: NURSE PRACTITIONER

## 2024-04-17 PROCEDURE — 93005 ELECTROCARDIOGRAM TRACING: CPT | Performed by: INTERNAL MEDICINE

## 2024-04-17 PROCEDURE — 71046 X-RAY EXAM CHEST 2 VIEWS: CPT | Mod: TC

## 2024-04-17 PROCEDURE — 99203 OFFICE O/P NEW LOW 30 MIN: CPT | Mod: S$GLB,,, | Performed by: NURSE PRACTITIONER

## 2024-04-17 PROCEDURE — 1126F AMNT PAIN NOTED NONE PRSNT: CPT | Mod: CPTII,S$GLB,, | Performed by: NURSE PRACTITIONER

## 2024-04-17 PROCEDURE — 71046 X-RAY EXAM CHEST 2 VIEWS: CPT | Mod: 26,,, | Performed by: RADIOLOGY

## 2024-04-17 PROCEDURE — 3075F SYST BP GE 130 - 139MM HG: CPT | Mod: CPTII,S$GLB,, | Performed by: NURSE PRACTITIONER

## 2024-04-17 PROCEDURE — 87641 MR-STAPH DNA AMP PROBE: CPT | Performed by: GENERAL PRACTICE

## 2024-04-17 PROCEDURE — 93010 ELECTROCARDIOGRAM REPORT: CPT | Mod: ,,, | Performed by: INTERNAL MEDICINE

## 2024-04-17 PROCEDURE — 80053 COMPREHEN METABOLIC PANEL: CPT | Performed by: GENERAL PRACTICE

## 2024-04-17 PROCEDURE — 3079F DIAST BP 80-89 MM HG: CPT | Mod: CPTII,S$GLB,, | Performed by: NURSE PRACTITIONER

## 2024-04-17 NOTE — PROGRESS NOTES
SUBJECTIVE:    Patient ID: Kennedy Thomas is a 83 y.o. male.    Chief Complaint: Establish Care    HPI    Patient here today with his wife to be evaluated for COPD. The patient states he was told he needed to see pulmonary because he had COPD as a diagnosis on his chart but has never been told that. He does not recall having ever had PFT. He states he sleeps on oxygen, was told he had BILL but could not tolerate machine so sleeps on oxygen.  He does have RA, follows with rheumatologist.  He is having pacemaker change this Friday.  His chest xray today had clear lungs.  Past Medical History:   Diagnosis Date    Arthritis     Atrial fibrillation     CHF (congestive heart failure)     CKD (chronic kidney disease)     COPD (chronic obstructive pulmonary disease)     Diabetes mellitus, type 2     Gout     Heart block     Hypertension     Pancreatitis     Peripheral vascular disease      Past Surgical History:   Procedure Laterality Date    APPENDECTOMY      CARDIAC PACEMAKER PLACEMENT  2010    ERCP N/A 7/12/2022    Procedure: ERCP (ENDOSCOPIC RETROGRADE CHOLANGIOPANCREATOGRAPHY);  Surgeon: Dale Luciano III, MD;  Location: [facility];  Service: Endoscopy;  Laterality: N/A;    EYE SURGERY      INGUINAL HERNIA REPAIR Left     LAPAROSCOPIC CHOLECYSTECTOMY WITH CHOLANGIOGRAPHY N/A 7/11/2022    Procedure: CHOLECYSTECTOMY, LAPAROSCOPIC, WITH CHOLANGIOGRAM;  Surgeon: Santa Cardona MD;  Location: [facility] OR;  Service: General;  Laterality: N/A;    UMBILICAL HERNIA REPAIR  7/11/2022    Procedure: REPAIR, HERNIA, UMBILICAL, AGE 5 YEARS OR OLDER;  Surgeon: Santa Cardona MD;  Location: [facility];  Service: General;;     Family History   Problem Relation Name Age of Onset    Diabetes Mellitus Mother          Social History:   Marital Status:   Occupation: retired diver   Alcohol History:  reports no history of alcohol use.  Tobacco History:  reports that he quit smoking about 73 years ago. His smoking use included  cigarettes. He started smoking about 50 years ago. He has never used smokeless tobacco.  Drug History:  reports no history of drug use.    Review of patient's allergies indicates:   Allergen Reactions    Statins-hmg-coa reductase inhibitors      Pancreatitis         Current Outpatient Medications   Medication Sig Dispense Refill    allopurinoL (ZYLOPRIM) 100 MG tablet TAKE 1 TABLET(100 MG) BY MOUTH EVERY DAY (Patient taking differently: Take 100 mg by mouth nightly.) 90 tablet 3    benazepril (LOTENSIN) 40 MG tablet Take 20 mg by mouth with lunch. At noon      ELIQUIS 5 mg Tab Take 5 mg by mouth 2 (two) times daily.      hydroxychloroquine (PLAQUENIL) 200 mg tablet Take 0.5 tablets (100 mg total) by mouth once daily. 90 tablet 1    metoprolol succinate (TOPROL-XL) 50 MG 24 hr tablet Take 50 mg by mouth once daily.      spironolactone (ALDACTONE) 25 MG tablet Take 25 mg by mouth.      SYMBICORT 160-4.5 mcg/actuation HFAA Inhale 1 puff into the lungs as needed.  0    terazosin (HYTRIN) 5 MG capsule Take 5 mg by mouth every evening.      torsemide (DEMADEX) 20 MG Tab Take 20 mg by mouth once daily.      vitamin D (VITAMIN D3) 1000 units Tab Take 5,000 Units by mouth.       Current Facility-Administered Medications   Medication Dose Route Frequency Provider Last Rate Last Admin    sodium chloride 0.9% flush 10 mL  10 mL Intravenous PRN Marcus Ledbetter MD               Review of Systems  General: Feeling Well.  Eyes: Vision is good.  ENT:  No sinusitis or pharyngitis.   Heart:: No chest pain or palpitations.  Lungs: No cough, sputum, or wheezing.  GI: No Nausea, vomiting, constipation, diarrhea, or reflux.  : No dysuria, hesitancy, or nocturia.  Musculoskeletal: No joint pain or myalgias.  Skin: No lesions or rashes.  Neuro: No headaches or neuropathy.  Lymph: No edema or adenopathy.  Psych: No anxiety or depression.  Endo: No weight change.    OBJECTIVE:      /80 (BP Location: Left arm, Patient Position:  "Sitting, BP Method: Medium (Manual))   Pulse 65   Ht 6' (1.829 m)   Wt 96.6 kg (213 lb)   SpO2 97%   BMI 28.89 kg/m²     Physical Exam  GENERAL: Older patient in no distress.  HEENT: Pupils equal and reactive. Extraocular movements intact. Nose intact.      Pharynx moist.  NECK: Supple.   HEART: Regular rate and rhythm. No murmur or gallop auscultated.  LUNGS: Clear to auscultation and percussion. Lung excursion symmetrical. No change in fremitus. No adventitial noises.  ABDOMEN: Bowel sounds present. Non-tender, no masses palpated.  EXTREMITIES: Normal muscle tone and joint movement, no cyanosis or clubbing.   LYMPHATICS: No adenopathy palpated, no edema.  SKIN: Dry, intact, no lesions.   NEURO: Cranial nerves II-XII intact. Motor strength 5/5 bilaterally, upper and lower extremities.  PSYCH: Appropriate affect.    Assessment:       1. Dyspnea, unspecified type    2. Rheumatoid arthritis, involving unspecified site, unspecified whether rheumatoid factor present          Plan:       Kennedy Helton" was seen today for establish care.    Diagnoses and all orders for this visit:    Dyspnea, unspecified type  -     Complete PFT with bronchodilator; Future    Rheumatoid arthritis, involving unspecified site, unspecified whether rheumatoid factor present       PFT   Will call with results   Follow up in about 3 weeks (around 5/8/2024).          "

## 2024-04-17 NOTE — PRE ADMISSION SCREENING
Preadmit assessment complete. Review of patient health history and home medications. Questions answered. Patient voiced understanding. Chlorhexidine scrub given to patient for preop shower Preop education per AVS pt states he has stopped his eliquis since 4/15/24

## 2024-04-17 NOTE — DISCHARGE INSTRUCTIONS
Your procedure is scheduled for: Friday 4/19/24            [facility] at: 8am       Nothing to eat or drink after midnight the night before your procedure.  Do not take any medications the morning of your procedure  Bring all your medications with you in the original pill bottles from pharmacy.  If you take blood thinners, ask your doctor when you should stop taking them.  Do your Hibiclens wash the night before and morning of your procedure.  If you use a CPAP or BiPAP at home, please bring it with you the day of your procedure.  Make arrangements for someone you know to drive you home after your procedure. Taxi and Uber are not acceptable.            Any questions call [facility] at [phone]

## 2024-04-19 ENCOUNTER — HOSPITAL ENCOUNTER (OUTPATIENT)
Facility: HOSPITAL | Age: 84
Discharge: HOME | End: 2024-04-19
Attending: GENERAL PRACTICE | Admitting: GENERAL PRACTICE
Payer: MEDICARE

## 2024-04-19 DIAGNOSIS — I25.110 ATHEROSCLEROSIS OF NATIVE CORONARY ARTERY OF NATIVE HEART WITH UNSTABLE ANGINA PECTORIS: ICD-10-CM

## 2024-04-19 DIAGNOSIS — Z95.0 CARDIAC PACEMAKER IN SITU: ICD-10-CM

## 2024-04-19 DIAGNOSIS — Z45.010 PACEMAKER GENERATOR END OF LIFE: ICD-10-CM

## 2024-04-19 DIAGNOSIS — I48.0 PAROXYSMAL ATRIAL FIBRILLATION: ICD-10-CM

## 2024-04-19 LAB
ALBUMIN SERPL BCP-MCNC: 4.1 G/DL (ref 3.5–5.2)
ALP SERPL-CCNC: 58 U/L (ref 55–135)
ALT SERPL W/O P-5'-P-CCNC: 12 U/L (ref 10–44)
ANION GAP SERPL CALC-SCNC: 8 MMOL/L (ref 8–16)
AST SERPL-CCNC: 16 U/L (ref 10–40)
BILIRUB SERPL-MCNC: 0.9 MG/DL (ref 0.1–1)
BUN SERPL-MCNC: 31 MG/DL (ref 8–23)
CALCIUM SERPL-MCNC: 9.3 MG/DL (ref 8.7–10.5)
CHLORIDE SERPL-SCNC: 104 MMOL/L (ref 95–110)
CO2 SERPL-SCNC: 27 MMOL/L (ref 23–29)
CREAT SERPL-MCNC: 1.3 MG/DL (ref 0.5–1.4)
EST. GFR  (NO RACE VARIABLE): 55.2 ML/MIN/1.73 M^2
GLUCOSE SERPL-MCNC: 109 MG/DL (ref 70–110)
POTASSIUM SERPL-SCNC: 4.1 MMOL/L (ref 3.5–5.1)
PROT SERPL-MCNC: 6.7 G/DL (ref 6–8.4)
SODIUM SERPL-SCNC: 139 MMOL/L (ref 136–145)

## 2024-04-19 PROCEDURE — C1785 PMKR, DUAL, RATE-RESP: HCPCS | Performed by: GENERAL PRACTICE

## 2024-04-19 PROCEDURE — 27800903 OPTIME MED/SURG SUP & DEVICES OTHER IMPLANTS: Performed by: GENERAL PRACTICE

## 2024-04-19 PROCEDURE — 96365 THER/PROPH/DIAG IV INF INIT: CPT | Mod: 59 | Performed by: GENERAL PRACTICE

## 2024-04-19 PROCEDURE — 33228 REMV&REPLC PM GEN DUAL LEAD: CPT | Mod: ,,, | Performed by: GENERAL PRACTICE

## 2024-04-19 PROCEDURE — 99152 MOD SED SAME PHYS/QHP 5/>YRS: CPT | Mod: ,,, | Performed by: GENERAL PRACTICE

## 2024-04-19 PROCEDURE — 80053 COMPREHEN METABOLIC PANEL: CPT | Performed by: GENERAL PRACTICE

## 2024-04-19 PROCEDURE — 63600175 PHARM REV CODE 636 W HCPCS: Performed by: GENERAL PRACTICE

## 2024-04-19 PROCEDURE — 27201423 OPTIME MED/SURG SUP & DEVICES STERILE SUPPLY: Performed by: GENERAL PRACTICE

## 2024-04-19 PROCEDURE — 33228 REMV&REPLC PM GEN DUAL LEAD: CPT | Performed by: GENERAL PRACTICE

## 2024-04-19 PROCEDURE — 99152 MOD SED SAME PHYS/QHP 5/>YRS: CPT | Performed by: GENERAL PRACTICE

## 2024-04-19 PROCEDURE — 25000003 PHARM REV CODE 250: Performed by: GENERAL PRACTICE

## 2024-04-19 PROCEDURE — 99153 MOD SED SAME PHYS/QHP EA: CPT | Performed by: GENERAL PRACTICE

## 2024-04-19 DEVICE — ENVELOPE CMRM6133 ABSORB LRG MR
Type: IMPLANTABLE DEVICE | Site: CHEST | Status: FUNCTIONAL
Brand: TYRX™

## 2024-04-19 DEVICE — IPG W1DR01 AZURE XT DR MRI USA
Type: IMPLANTABLE DEVICE | Site: CHEST | Status: FUNCTIONAL
Brand: AZURE™ XT DR MRI SURESCAN™

## 2024-04-19 RX ORDER — MIDAZOLAM HYDROCHLORIDE 1 MG/ML
INJECTION INTRAMUSCULAR; INTRAVENOUS
Status: DISCONTINUED | OUTPATIENT
Start: 2024-04-19 | End: 2024-04-19 | Stop reason: HOSPADM

## 2024-04-19 RX ORDER — SODIUM CHLORIDE 9 MG/ML
INJECTION, SOLUTION INTRAVENOUS CONTINUOUS
Status: DISCONTINUED | OUTPATIENT
Start: 2024-04-19 | End: 2024-04-19 | Stop reason: HOSPADM

## 2024-04-19 RX ORDER — LIDOCAINE HYDROCHLORIDE 10 MG/ML
INJECTION INFILTRATION; PERINEURAL
Status: DISCONTINUED | OUTPATIENT
Start: 2024-04-19 | End: 2024-04-19 | Stop reason: HOSPADM

## 2024-04-19 RX ORDER — VANCOMYCIN HYDROCHLORIDE 1 G/20ML
INJECTION, POWDER, LYOPHILIZED, FOR SOLUTION INTRAVENOUS
Status: DISCONTINUED | OUTPATIENT
Start: 2024-04-19 | End: 2024-04-19 | Stop reason: HOSPADM

## 2024-04-19 RX ORDER — FENTANYL CITRATE 50 UG/ML
INJECTION, SOLUTION INTRAMUSCULAR; INTRAVENOUS
Status: DISCONTINUED | OUTPATIENT
Start: 2024-04-19 | End: 2024-04-19 | Stop reason: HOSPADM

## 2024-04-19 RX ORDER — APIXABAN 5 MG/1
5 TABLET, FILM COATED ORAL 2 TIMES DAILY
Qty: 90 TABLET | Refills: 3 | Status: SHIPPED | OUTPATIENT
Start: 2024-04-22

## 2024-04-19 RX ORDER — DOXYCYCLINE 100 MG/1
100 CAPSULE ORAL EVERY 12 HOURS
Qty: 10 CAPSULE | Refills: 0 | Status: SHIPPED | OUTPATIENT
Start: 2024-04-19 | End: 2024-04-29

## 2024-04-19 RX ADMIN — SODIUM CHLORIDE: 9 INJECTION, SOLUTION INTRAVENOUS at 07:04

## 2024-04-19 RX ADMIN — VANCOMYCIN HYDROCHLORIDE 2000 MG: 5 INJECTION, POWDER, LYOPHILIZED, FOR SOLUTION INTRAVENOUS at 08:04

## 2024-04-19 NOTE — DISCHARGE SUMMARY
Count includes the [facility]  Discharge Note  Short Stay    Procedure(s) (LRB):  REPLACEMENT, PACEMAKER GENERATOR (N/A)      OUTCOME: Patient tolerated treatment/procedure well without complication and is now ready for discharge.    Successful generator change.     I certify that I was present for the critical steps of the procedure including the diagnostic, surgical and/or interventional portions.      Procedure Log documented by Documenter: Jeannie Reeder RN and verified by Marcus Ledbetter MD.     Date: 4/19/2024  Time: 12:06 PM        Recommendations     Wound check follow-up appointment in 2 week(s).           DISPOSITION: Home or Self Care    FINAL DIAGNOSIS:  <principal problem not specified>    FOLLOWUP: In clinic    DISCHARGE INSTRUCTIONS:  No discharge procedures on file.     TIME SPENT ON DISCHARGE: 35 minutes         Medication List        START taking these medications      doxycycline 100 MG Cap  Commonly known as: VIBRAMYCIN  Take 1 capsule (100 mg total) by mouth every 12 (twelve) hours.            CHANGE how you take these medications      allopurinoL 100 MG tablet  Commonly known as: ZYLOPRIM  TAKE 1 TABLET(100 MG) BY MOUTH EVERY DAY  What changed: when to take this     ELIQUIS 5 mg Tab  Generic drug: apixaban  Take 1 tablet (5 mg total) by mouth 2 (two) times daily.  Start taking on: April 22, 2024  What changed: These instructions start on April 22, 2024. If you are unsure what to do until then, ask your doctor or other care provider.            CONTINUE taking these medications      benazepriL 40 MG tablet  Commonly known as: LOTENSIN     hydroxychloroquine 200 mg tablet  Commonly known as: PLAQUENIL  Take 0.5 tablets (100 mg total) by mouth once daily.     metoprolol succinate 50 MG 24 hr tablet  Commonly known as: TOPROL-XL     spironolactone 25 MG tablet  Commonly known as: ALDACTONE     SYMBICORT 160-4.5 mcg/actuation Hfaa  Generic drug: budesonide-formoterol 160-4.5 mcg     terazosin 5 MG  capsule  Commonly known as: HYTRIN     torsemide 20 MG Tab  Commonly known as: DEMADEX     vitamin D 1000 units Tab  Commonly known as: VITAMIN D3               Where to Get Your Medications        These medications were sent to [id number] - [address] & [address] [address]      Hours: 24-hours Phone: [phone]   doxycycline 100 MG Cap  ELIQUIS 5 mg Tab

## 2024-04-19 NOTE — BRIEF OP NOTE
PACEMAKER GENERATOR CHANGE WITHOUT COMPLICATIONS    DID ADMISSION F4/19  DATE OF DISCHARGE THE SAME    COMPLICATIONS NONE    ACTIVITY AS TOLERATED    FOLLOW-UP IN THE OFFICE 2 WEEKS    ACTIVITY LIMITED    DIET REGULAR    MEDICATIONS HOLD ELIQUIS FOR 2 DAYS    DOXYCYCLINE 100 MG B.I.D.

## 2024-04-19 NOTE — Clinical Note
The lead thresholds were tested and was connected to generator.      The chronic RV lead was connected to a replacement chronic generator.

## 2024-04-21 LAB
OHS QRS DURATION: 160 MS
OHS QTC CALCULATION: 468 MS

## 2024-04-22 ENCOUNTER — TELEPHONE (OUTPATIENT)
Dept: CARDIOLOGY | Facility: CLINIC | Age: 84
End: 2024-04-22
Payer: MEDICARE

## 2024-04-22 VITALS
HEART RATE: 58 BPM | OXYGEN SATURATION: 95 % | DIASTOLIC BLOOD PRESSURE: 69 MMHG | RESPIRATION RATE: 16 BRPM | SYSTOLIC BLOOD PRESSURE: 132 MMHG

## 2024-04-22 NOTE — TELEPHONE ENCOUNTER
----- Message from Mariela Saba, Patient Care Assistant sent at 4/22/2024 10:58 AM CDT -----  Contact: Pt  Type: Needs Medical Advice    Who Called: Pt  Best Call Back Number: [phone]  Inquiry/Question: Pt is calling to get a nurse visit to remove stitches. Please advise Thank you~

## 2024-04-25 ENCOUNTER — LAB VISIT (OUTPATIENT)
Dept: LAB | Facility: HOSPITAL | Age: 84
End: 2024-04-25
Attending: STUDENT IN AN ORGANIZED HEALTH CARE EDUCATION/TRAINING PROGRAM
Payer: MEDICARE

## 2024-04-25 ENCOUNTER — TELEPHONE (OUTPATIENT)
Dept: RHEUMATOLOGY | Facility: CLINIC | Age: 84
End: 2024-04-25
Payer: MEDICARE

## 2024-04-25 DIAGNOSIS — M05.79 RHEUMATOID ARTHRITIS INVOLVING MULTIPLE SITES WITH POSITIVE RHEUMATOID FACTOR: ICD-10-CM

## 2024-04-25 DIAGNOSIS — Z79.899 LONG-TERM USE OF PLAQUENIL: ICD-10-CM

## 2024-04-25 DIAGNOSIS — M10.9 GOUT, UNSPECIFIED CAUSE, UNSPECIFIED CHRONICITY, UNSPECIFIED SITE: ICD-10-CM

## 2024-04-25 DIAGNOSIS — Z79.899 ENCOUNTER FOR LONG-TERM (CURRENT) DRUG USE: ICD-10-CM

## 2024-04-25 LAB
ALBUMIN SERPL BCP-MCNC: 4.1 G/DL (ref 3.5–5.2)
ALP SERPL-CCNC: 63 U/L (ref 55–135)
ALT SERPL W/O P-5'-P-CCNC: 10 U/L (ref 10–44)
ANION GAP SERPL CALC-SCNC: 5 MMOL/L (ref 8–16)
AST SERPL-CCNC: 15 U/L (ref 10–40)
BASOPHILS # BLD AUTO: 0.06 K/UL (ref 0–0.2)
BASOPHILS NFR BLD: 1.2 % (ref 0–1.9)
BILIRUB SERPL-MCNC: 1.2 MG/DL (ref 0.1–1)
BUN SERPL-MCNC: 28 MG/DL (ref 8–23)
CALCIUM SERPL-MCNC: 9.7 MG/DL (ref 8.7–10.5)
CHLORIDE SERPL-SCNC: 104 MMOL/L (ref 95–110)
CO2 SERPL-SCNC: 31 MMOL/L (ref 23–29)
CREAT SERPL-MCNC: 1.2 MG/DL (ref 0.5–1.4)
CRP SERPL-MCNC: 0.4 MG/DL
DIFFERENTIAL METHOD BLD: ABNORMAL
EOSINOPHIL # BLD AUTO: 0.1 K/UL (ref 0–0.5)
EOSINOPHIL NFR BLD: 2.7 % (ref 0–8)
ERYTHROCYTE [DISTWIDTH] IN BLOOD BY AUTOMATED COUNT: 12.3 % (ref 11.5–14.5)
ERYTHROCYTE [SEDIMENTATION RATE] IN BLOOD BY WESTERGREN METHOD: 9 MM/HR (ref 0–10)
EST. GFR  (NO RACE VARIABLE): >60 ML/MIN/1.73 M^2
GLUCOSE SERPL-MCNC: 104 MG/DL (ref 70–110)
HCT VFR BLD AUTO: 36.6 % (ref 40–54)
HGB BLD-MCNC: 12.5 G/DL (ref 14–18)
IMM GRANULOCYTES # BLD AUTO: 0.01 K/UL (ref 0–0.04)
IMM GRANULOCYTES NFR BLD AUTO: 0.2 % (ref 0–0.5)
LYMPHOCYTES # BLD AUTO: 1.4 K/UL (ref 1–4.8)
LYMPHOCYTES NFR BLD: 29.3 % (ref 18–48)
MCH RBC QN AUTO: 32.3 PG (ref 27–31)
MCHC RBC AUTO-ENTMCNC: 34.2 G/DL (ref 32–36)
MCV RBC AUTO: 95 FL (ref 82–98)
MONOCYTES # BLD AUTO: 0.4 K/UL (ref 0.3–1)
MONOCYTES NFR BLD: 9.1 % (ref 4–15)
NEUTROPHILS # BLD AUTO: 2.8 K/UL (ref 1.8–7.7)
NEUTROPHILS NFR BLD: 57.5 % (ref 38–73)
NRBC BLD-RTO: 0 /100 WBC
PLATELET # BLD AUTO: 124 K/UL (ref 150–450)
PMV BLD AUTO: 10.6 FL (ref 9.2–12.9)
POTASSIUM SERPL-SCNC: 4.7 MMOL/L (ref 3.5–5.1)
PROT SERPL-MCNC: 7 G/DL (ref 6–8.4)
RBC # BLD AUTO: 3.87 M/UL (ref 4.6–6.2)
SODIUM SERPL-SCNC: 140 MMOL/L (ref 136–145)
URATE SERPL-MCNC: 5.8 MG/DL (ref 3.4–7)
WBC # BLD AUTO: 4.85 K/UL (ref 3.9–12.7)

## 2024-04-25 PROCEDURE — 85025 COMPLETE CBC W/AUTO DIFF WBC: CPT | Performed by: STUDENT IN AN ORGANIZED HEALTH CARE EDUCATION/TRAINING PROGRAM

## 2024-04-25 PROCEDURE — 80053 COMPREHEN METABOLIC PANEL: CPT | Performed by: STUDENT IN AN ORGANIZED HEALTH CARE EDUCATION/TRAINING PROGRAM

## 2024-04-25 PROCEDURE — 85651 RBC SED RATE NONAUTOMATED: CPT | Performed by: STUDENT IN AN ORGANIZED HEALTH CARE EDUCATION/TRAINING PROGRAM

## 2024-04-25 PROCEDURE — 36415 COLL VENOUS BLD VENIPUNCTURE: CPT | Performed by: STUDENT IN AN ORGANIZED HEALTH CARE EDUCATION/TRAINING PROGRAM

## 2024-04-25 PROCEDURE — 84550 ASSAY OF BLOOD/URIC ACID: CPT | Performed by: STUDENT IN AN ORGANIZED HEALTH CARE EDUCATION/TRAINING PROGRAM

## 2024-04-25 PROCEDURE — 86140 C-REACTIVE PROTEIN: CPT | Performed by: STUDENT IN AN ORGANIZED HEALTH CARE EDUCATION/TRAINING PROGRAM

## 2024-04-25 NOTE — TELEPHONE ENCOUNTER
Spoke to the patient and stated that he was not taking any NSAID's at this time. But did state that he needs to drink more water. States understanding  ----- Message from Gracia Chow MD sent at 4/25/2024  3:16 PM CDT -----  Kidney function has worsened a bit.  Please see if he is taking any NSAIDs and have him discontinue if so  ----- Message -----  From: [facility]  Sent: 4/25/2024   1:22 PM CDT  To: Gracia Chow MD

## 2024-04-28 NOTE — PROGRESS NOTES
Subjective:    Patient ID:  Kennedy Thomas is a 83 y.o. male who presents for follow-up of   Chief Complaint   Patient presents with    Hospital Follow Up     Pacemaker follow up        HPI:  4-9-24    HE IS REFERRED BY DR. ELY CASTRO FOR GEN CHANGE  He has a history of a permanent pacemaker placed originally by Dr. CARRINGTON.  He has had for generator changes since that time last stone being about 9 years ago when he had traumatic injury from a 2 x 4 with a hematoma and seroma he had a previous episode were had a traumatic injury to the wire and the wires were replaced.  His atrial fibrillation and history of 4 vessel pulmonary vein eyes ablation.  He is generally 100% paced in the atrium and ventricle.  He had a convergent Maze procedure.  Previous echo revealed the dilated left ventricle EF of 50% pulmonary artery systolic pressure estimated at 62 mmHg had a Lexiscan Myoview stress test negative for ischemia his heart failure mildly decreased ejection fraction is on goal-directed medical therapy.  He had marked dyspnea on exertion before the goal-directed therapy was started now he feels like he can walk a significant distance without dyspnea on exertion He is on chronic anticoagulation therapy with Eliquis.  Is sleep apnea can not tolerate CPAP and uses oxygen 2 L.     EKG SHOWS ATRIAL FIBRILLATION AND VENTRICULAR PACED RHYTHM    Review of patient's allergies indicates:   Allergen Reactions    Statins-hmg-coa reductase inhibitors      Pancreatitis         Past Medical History:   Diagnosis Date    Arthritis     Atrial fibrillation     CHF (congestive heart failure)     CKD (chronic kidney disease)     COPD (chronic obstructive pulmonary disease)     Diabetes mellitus, type 2     Gout     Heart block     Hypertension     Pancreatitis     Peripheral vascular disease      Past Surgical History:   Procedure Laterality Date    APPENDECTOMY      CARDIAC PACEMAKER PLACEMENT  2010    ERCP N/A 7/12/2022    Procedure:  ERCP (ENDOSCOPIC RETROGRADE CHOLANGIOPANCREATOGRAPHY);  Surgeon: Dale Luciano III, MD;  Location: [facility] ENDO;  Service: Endoscopy;  Laterality: N/A;    EYE SURGERY      INGUINAL HERNIA REPAIR Left     LAPAROSCOPIC CHOLECYSTECTOMY WITH CHOLANGIOGRAPHY N/A 2022    Procedure: CHOLECYSTECTOMY, LAPAROSCOPIC, WITH CHOLANGIOGRAM;  Surgeon: Santa Cardona MD;  Location: [facility] OR;  Service: General;  Laterality: N/A;    REPLACEMENT OF PACEMAKER GENERATOR N/A 2024    Procedure: REPLACEMENT, PACEMAKER GENERATOR;  Surgeon: Marcus eLdbetter MD;  Location: [facility] CATH/EP LAB;  Service: Cardiology;  Laterality: N/A;    UMBILICAL HERNIA REPAIR  2022    Procedure: REPAIR, HERNIA, UMBILICAL, AGE 5 YEARS OR OLDER;  Surgeon: Santa Cardona MD;  Location: [facility] OR;  Service: General;;     Social History     Tobacco Use    Smoking status: Former     Types: Cigarettes     Start date:      Quit date:      Years since quittin.3    Smokeless tobacco: Never   Substance Use Topics    Alcohol use: No    Drug use: No     Family History   Problem Relation Name Age of Onset    Diabetes Mellitus Mother          Review of Systems:   Constitution: Negative for diaphoresis and fever.   HEENT: Negative for nosebleeds.    Cardiovascular: Negative for chest pain       No dyspnea on exertion       No leg swelling        No palpitations  Respiratory: Negative for shortness of breath and wheezing.    Hematologic/Lymphatic: Negative for bleeding problem. Does not bruise/bleed easily.   Skin: Negative for color change and rash.   Musculoskeletal: Negative for falls and myalgias.   Gastrointestinal: Negative for hematemesis and hematochezia.   Genitourinary: Negative for hematuria.   Neurological: Negative for dizziness and light-headedness.   Psychiatric/Behavioral: Negative for altered mental status and memory loss.          Objective:        Vitals:    24 1259   BP: 132/71   Pulse: 62       Lab Results    Component Value Date    WBC 4.85 04/25/2024    HGB 12.5 (L) 04/25/2024    HCT 36.6 (L) 04/25/2024     (L) 04/25/2024    ALT 10 04/25/2024    AST 15 04/25/2024     04/25/2024    K 4.7 04/25/2024     04/25/2024    CREATININE 1.2 04/25/2024    BUN 28 (H) 04/25/2024    CO2 31 (H) 04/25/2024    INR 1.6 07/13/2022    INR 1.6 07/13/2022        ECHOCARDIOGRAM RESULTS  No results found for this or any previous visit.        CURRENT/PREVIOUS VISIT EKG  Results for orders placed or performed during the hospital encounter of 04/17/24   EKG 12-lead    Collection Time: 04/17/24  9:03 AM   Result Value Ref Range    QRS Duration 156 ms    [facility]HS QTC Calculation 468 ms    Narrative    Test Reason : Z01.818,Z01.818,    Vent. Rate : 065 BPM     Atrial Rate : 072 BPM     P-R Int : 000 ms          QRS Dur : 156 ms      QT Int : 450 ms       P-R-T Axes : 000 -59 116 degrees     QTc Int : 468 ms    Ventricular-paced rhythm  Abnormal ECG  When compared with ECG of 09-APR-2024 09:51,  No significant change was found    Referred By:             Confirmed By:      No valid procedures specified.   Results for orders placed during the hospital encounter of 04/07/22    Nuclear Stress Test    Interpretation Summary    The nuclear portion of this study will be reported separately.    The EKG portion of this study is uninterpretable.    The patient reported no chest pain during the stress test.    During stress, rare PVCs are noted.    Ventricular paced rhythm      Physical Exam:  CONSTITUTIONAL: No fever, no chills  HEENT: Normocephalic, atraumatic,pupils reactive to light                 NECK:  No JVD no carotid bruit  CVS: S1S2+, RRR, no murmurs,   LUNGS: Clear PACEMAKER SITE WELL HEALED  ABDOMEN: Soft, NT, BS+  EXTREMITIES: No cyanosis, edema  : No alves catheter  NEURO: AAO X 3  PSY: Normal affect      Medication List with Changes/Refills   Current Medications    ALLOPURINOL (ZYLOPRIM) 100 MG TABLET    TAKE 1 TABLET(100 MG)  BY MOUTH EVERY DAY    BENAZEPRIL (LOTENSIN) 40 MG TABLET    Take 20 mg by mouth with lunch. At noon    ELIQUIS 5 MG TAB    Take 1 tablet (5 mg total) by mouth 2 (two) times daily.    HYDROXYCHLOROQUINE (PLAQUENIL) 200 MG TABLET    Take 0.5 tablets (100 mg total) by mouth once daily.    METOPROLOL SUCCINATE (TOPROL-XL) 50 MG 24 HR TABLET    Take 50 mg by mouth once daily.    SPIRONOLACTONE (ALDACTONE) 25 MG TABLET    Take 25 mg by mouth.    SYMBICORT 160-4.5 MCG/ACTUATION HFAA    Inhale 1 puff into the lungs as needed.    TERAZOSIN (HYTRIN) 5 MG CAPSULE    Take 5 mg by mouth every evening.    TORSEMIDE (DEMADEX) 20 MG TAB    Take 20 mg by mouth once daily.    VITAMIN D (VITAMIN D3) 1000 UNITS TAB    Take 5,000 Units by mouth.   Discontinued Medications    DOXYCYCLINE (VIBRAMYCIN) 100 MG CAP    Take 1 capsule (100 mg total) by mouth every 12 (twelve) hours.             Assessment:       1. Pacemaker generator end of life    2. H/O cardiac radiofrequency ablation    3. Paroxysmal atrial fibrillation    4. Statin intolerance    5. Chronic anticoagulation    6. Status post Maze operation for atrial fibrillation    7. BILL (obstructive sleep apnea)         Plan:     Problem List Items Addressed This Visit    None  Visit Diagnoses       Pacemaker generator end of life    -  Primary    Relevant Orders    IN OFFICE EKG 12-LEAD (to Muse)    H/O cardiac radiofrequency ablation        Paroxysmal atrial fibrillation        Statin intolerance        Chronic anticoagulation        Status post Maze operation for atrial fibrillation        BILL (obstructive sleep apnea)            EKG ATRIAL FIBRILLATION V PACED  FU DR CASTRO.  Follow up in about 1 year (around 4/29/2025), or if symptoms worsen or fail to improve.PRN  The patients questions were answered, they verbalized understanding, and agreed with the treatment plan.     JOJO FU MD  [facility]

## 2024-04-28 NOTE — H&P (VIEW-ONLY)
Subjective:    Patient ID:  Kennedy Thomas is a 83 y.o. male who presents for follow-up of   Chief Complaint   Patient presents with    Hospital Follow Up     Pacemaker follow up        HPI:  4-9-24    HE IS REFERRED BY DR. ELY CASTRO FOR GEN CHANGE  He has a history of a permanent pacemaker placed originally by Dr. CARRINGTON.  He has had for generator changes since that time last stone being about 9 years ago when he had traumatic injury from a 2 x 4 with a hematoma and seroma he had a previous episode were had a traumatic injury to the wire and the wires were replaced.  His atrial fibrillation and history of 4 vessel pulmonary vein eyes ablation.  He is generally 100% paced in the atrium and ventricle.  He had a convergent Maze procedure.  Previous echo revealed the dilated left ventricle EF of 50% pulmonary artery systolic pressure estimated at 62 mmHg had a Lexiscan Myoview stress test negative for ischemia his heart failure mildly decreased ejection fraction is on goal-directed medical therapy.  He had marked dyspnea on exertion before the goal-directed therapy was started now he feels like he can walk a significant distance without dyspnea on exertion He is on chronic anticoagulation therapy with Eliquis.  Is sleep apnea can not tolerate CPAP and uses oxygen 2 L.     EKG SHOWS ATRIAL FIBRILLATION AND VENTRICULAR PACED RHYTHM    Review of patient's allergies indicates:   Allergen Reactions    Statins-hmg-coa reductase inhibitors      Pancreatitis         Past Medical History:   Diagnosis Date    Arthritis     Atrial fibrillation     CHF (congestive heart failure)     CKD (chronic kidney disease)     COPD (chronic obstructive pulmonary disease)     Diabetes mellitus, type 2     Gout     Heart block     Hypertension     Pancreatitis     Peripheral vascular disease      Past Surgical History:   Procedure Laterality Date    APPENDECTOMY      CARDIAC PACEMAKER PLACEMENT  2010    ERCP N/A 7/12/2022    Procedure:  ERCP (ENDOSCOPIC RETROGRADE CHOLANGIOPANCREATOGRAPHY);  Surgeon: Dale Luciano III, MD;  Location: [facility] ENDO;  Service: Endoscopy;  Laterality: N/A;    EYE SURGERY      INGUINAL HERNIA REPAIR Left     LAPAROSCOPIC CHOLECYSTECTOMY WITH CHOLANGIOGRAPHY N/A 2022    Procedure: CHOLECYSTECTOMY, LAPAROSCOPIC, WITH CHOLANGIOGRAM;  Surgeon: Santa Cardona MD;  Location: [facility] OR;  Service: General;  Laterality: N/A;    REPLACEMENT OF PACEMAKER GENERATOR N/A 2024    Procedure: REPLACEMENT, PACEMAKER GENERATOR;  Surgeon: Marcus Ledbetter MD;  Location: [facility] CATH/EP LAB;  Service: Cardiology;  Laterality: N/A;    UMBILICAL HERNIA REPAIR  2022    Procedure: REPAIR, HERNIA, UMBILICAL, AGE 5 YEARS OR OLDER;  Surgeon: Santa Cardona MD;  Location: [facility] OR;  Service: General;;     Social History     Tobacco Use    Smoking status: Former     Types: Cigarettes     Start date:      Quit date:      Years since quittin.3    Smokeless tobacco: Never   Substance Use Topics    Alcohol use: No    Drug use: No     Family History   Problem Relation Name Age of Onset    Diabetes Mellitus Mother          Review of Systems:   Constitution: Negative for diaphoresis and fever.   HEENT: Negative for nosebleeds.    Cardiovascular: Negative for chest pain       No dyspnea on exertion       No leg swelling        No palpitations  Respiratory: Negative for shortness of breath and wheezing.    Hematologic/Lymphatic: Negative for bleeding problem. Does not bruise/bleed easily.   Skin: Negative for color change and rash.   Musculoskeletal: Negative for falls and myalgias.   Gastrointestinal: Negative for hematemesis and hematochezia.   Genitourinary: Negative for hematuria.   Neurological: Negative for dizziness and light-headedness.   Psychiatric/Behavioral: Negative for altered mental status and memory loss.          Objective:        Vitals:    24 1259   BP: 132/71   Pulse: 62       Lab Results    Component Value Date    WBC 4.85 04/25/2024    HGB 12.5 (L) 04/25/2024    HCT 36.6 (L) 04/25/2024     (L) 04/25/2024    ALT 10 04/25/2024    AST 15 04/25/2024     04/25/2024    K 4.7 04/25/2024     04/25/2024    CREATININE 1.2 04/25/2024    BUN 28 (H) 04/25/2024    CO2 31 (H) 04/25/2024    INR 1.6 07/13/2022    INR 1.6 07/13/2022        ECHOCARDIOGRAM RESULTS  No results found for this or any previous visit.        CURRENT/PREVIOUS VISIT EKG  Results for orders placed or performed during the hospital encounter of 04/17/24   EKG 12-lead    Collection Time: 04/17/24  9:03 AM   Result Value Ref Range    QRS Duration 156 ms    [facility]HS QTC Calculation 468 ms    Narrative    Test Reason : Z01.818,Z01.818,    Vent. Rate : 065 BPM     Atrial Rate : 072 BPM     P-R Int : 000 ms          QRS Dur : 156 ms      QT Int : 450 ms       P-R-T Axes : 000 -59 116 degrees     QTc Int : 468 ms    Ventricular-paced rhythm  Abnormal ECG  When compared with ECG of 09-APR-2024 09:51,  No significant change was found    Referred By:             Confirmed By:      No valid procedures specified.   Results for orders placed during the hospital encounter of 04/07/22    Nuclear Stress Test    Interpretation Summary    The nuclear portion of this study will be reported separately.    The EKG portion of this study is uninterpretable.    The patient reported no chest pain during the stress test.    During stress, rare PVCs are noted.    Ventricular paced rhythm      Physical Exam:  CONSTITUTIONAL: No fever, no chills  HEENT: Normocephalic, atraumatic,pupils reactive to light                 NECK:  No JVD no carotid bruit  CVS: S1S2+, RRR, no murmurs,   LUNGS: Clear PACEMAKER SITE WELL HEALED  ABDOMEN: Soft, NT, BS+  EXTREMITIES: No cyanosis, edema  : No alves catheter  NEURO: AAO X 3  PSY: Normal affect      Medication List with Changes/Refills   Current Medications    ALLOPURINOL (ZYLOPRIM) 100 MG TABLET    TAKE 1 TABLET(100 MG)  BY MOUTH EVERY DAY    BENAZEPRIL (LOTENSIN) 40 MG TABLET    Take 20 mg by mouth with lunch. At noon    ELIQUIS 5 MG TAB    Take 1 tablet (5 mg total) by mouth 2 (two) times daily.    HYDROXYCHLOROQUINE (PLAQUENIL) 200 MG TABLET    Take 0.5 tablets (100 mg total) by mouth once daily.    METOPROLOL SUCCINATE (TOPROL-XL) 50 MG 24 HR TABLET    Take 50 mg by mouth once daily.    SPIRONOLACTONE (ALDACTONE) 25 MG TABLET    Take 25 mg by mouth.    SYMBICORT 160-4.5 MCG/ACTUATION HFAA    Inhale 1 puff into the lungs as needed.    TERAZOSIN (HYTRIN) 5 MG CAPSULE    Take 5 mg by mouth every evening.    TORSEMIDE (DEMADEX) 20 MG TAB    Take 20 mg by mouth once daily.    VITAMIN D (VITAMIN D3) 1000 UNITS TAB    Take 5,000 Units by mouth.   Discontinued Medications    DOXYCYCLINE (VIBRAMYCIN) 100 MG CAP    Take 1 capsule (100 mg total) by mouth every 12 (twelve) hours.             Assessment:       1. Pacemaker generator end of life    2. H/O cardiac radiofrequency ablation    3. Paroxysmal atrial fibrillation    4. Statin intolerance    5. Chronic anticoagulation    6. Status post Maze operation for atrial fibrillation    7. BILL (obstructive sleep apnea)         Plan:     Problem List Items Addressed This Visit    None  Visit Diagnoses       Pacemaker generator end of life    -  Primary    Relevant Orders    IN OFFICE EKG 12-LEAD (to Muse)    H/O cardiac radiofrequency ablation        Paroxysmal atrial fibrillation        Statin intolerance        Chronic anticoagulation        Status post Maze operation for atrial fibrillation        BILL (obstructive sleep apnea)            EKG ATRIAL FIBRILLATION V PACED  FU DR CASTRO.  Follow up in about 1 year (around 4/29/2025), or if symptoms worsen or fail to improve.PRN  The patients questions were answered, they verbalized understanding, and agreed with the treatment plan.     JOJO FU MD  [facility]

## 2024-04-29 ENCOUNTER — OFFICE VISIT (OUTPATIENT)
Dept: CARDIOLOGY | Facility: CLINIC | Age: 84
End: 2024-04-29
Payer: MEDICARE

## 2024-04-29 VITALS
HEART RATE: 62 BPM | SYSTOLIC BLOOD PRESSURE: 132 MMHG | OXYGEN SATURATION: 96 % | DIASTOLIC BLOOD PRESSURE: 71 MMHG | WEIGHT: 212.94 LBS | BODY MASS INDEX: 28.88 KG/M2

## 2024-04-29 DIAGNOSIS — T82.191D: Primary | ICD-10-CM

## 2024-04-29 DIAGNOSIS — Z98.890 H/O CARDIAC RADIOFREQUENCY ABLATION: ICD-10-CM

## 2024-04-29 DIAGNOSIS — Z79.01 CHRONIC ANTICOAGULATION: ICD-10-CM

## 2024-04-29 DIAGNOSIS — I48.0 PAROXYSMAL ATRIAL FIBRILLATION: ICD-10-CM

## 2024-04-29 DIAGNOSIS — G47.33 OSA (OBSTRUCTIVE SLEEP APNEA): ICD-10-CM

## 2024-04-29 DIAGNOSIS — Z86.79 STATUS POST MAZE OPERATION FOR ATRIAL FIBRILLATION: ICD-10-CM

## 2024-04-29 DIAGNOSIS — Z78.9 STATIN INTOLERANCE: ICD-10-CM

## 2024-04-29 DIAGNOSIS — Z45.010 PACEMAKER GENERATOR END OF LIFE: ICD-10-CM

## 2024-04-29 DIAGNOSIS — Z98.890 STATUS POST MAZE OPERATION FOR ATRIAL FIBRILLATION: ICD-10-CM

## 2024-04-29 PROCEDURE — 1101F PT FALLS ASSESS-DOCD LE1/YR: CPT | Mod: CPTII,S$GLB,, | Performed by: GENERAL PRACTICE

## 2024-04-29 PROCEDURE — 3078F DIAST BP <80 MM HG: CPT | Mod: CPTII,S$GLB,, | Performed by: GENERAL PRACTICE

## 2024-04-29 PROCEDURE — 99999 PR PBB SHADOW E&M-EST. PATIENT-LVL III: CPT | Mod: PBBFAC,,, | Performed by: GENERAL PRACTICE

## 2024-04-29 PROCEDURE — 99213 OFFICE O/P EST LOW 20 MIN: CPT | Mod: 25,S$GLB,, | Performed by: GENERAL PRACTICE

## 2024-04-29 PROCEDURE — 1159F MED LIST DOCD IN RCRD: CPT | Mod: CPTII,S$GLB,, | Performed by: GENERAL PRACTICE

## 2024-04-29 PROCEDURE — 3288F FALL RISK ASSESSMENT DOCD: CPT | Mod: CPTII,S$GLB,, | Performed by: GENERAL PRACTICE

## 2024-04-29 PROCEDURE — 1160F RVW MEDS BY RX/DR IN RCRD: CPT | Mod: CPTII,S$GLB,, | Performed by: GENERAL PRACTICE

## 2024-04-29 PROCEDURE — 3075F SYST BP GE 130 - 139MM HG: CPT | Mod: CPTII,S$GLB,, | Performed by: GENERAL PRACTICE

## 2024-04-29 PROCEDURE — 93000 ELECTROCARDIOGRAM COMPLETE: CPT | Mod: S$GLB,,, | Performed by: GENERAL PRACTICE

## 2024-05-01 NOTE — PROGRESS NOTES
"Subjective:      Patient ID: Kennedy Thomas is a 83 y.o. male.    Chief Complaint: Disease Management    HPI     Rheumatologic History:   - Diagnosis/es:              - Gout initially diagnosed when he presented with podagra and midfoot pain and swelling              - Seronegative inflammatory arthritis  - Positive serologies: -  - Negative serologies: RF, CCP  - Infectious screening labs:  Negative hepatitis-B and C (07/2022)  - Imaging: -  - Previous Treatments: -   - Current Treatments:               - Gout:                          - Allopurinol 100mg              - Seronegative inflammatory arthritis:                           - HCQ 200mg daily  - Plaquenil eye exam: No HCQ toxicity (4/2023)  Interval History:   He is doing well and denies joint pain, swelling, and medication adverse effects    Objective:   /69   Pulse 69   Ht 6' 0.01" (1.829 m)   Wt 95.6 kg (210 lb 12.2 oz)   BMI 28.58 kg/m²   Physical Exam   Constitutional: normal appearance.   HENT:   Head: Normocephalic and atraumatic.   Cardiovascular: Normal rate, regular rhythm and normal heart sounds.   Pulmonary/Chest: Effort normal and breath sounds normal.   Musculoskeletal:      Comments: Degenerative changes  No synovitis, dactylitis, enthesitis, effusions     Neurological: He is alert.   Skin: Skin is warm and dry. Bruising noted.       No data to display     Labs independently reviewed by me (04/25/2024)   CBC HGB 12.5, platelets 124, WBC WNL   CMP CR and LFTs WNL  ESR and CRP WNL   Uric acid 5.8    Assessment:     1. Rheumatoid arthritis involving multiple sites with positive rheumatoid factor    2. Gout, unspecified cause, unspecified chronicity, unspecified site    3. Long-term use of Plaquenil    4. Encounter for long-term (current) drug use    5. Bicytopenia      This is an 83-year-old man with history of atrial fibrillation s/p pacemaker, CKD, COPD, pre-DM, HTN, 2 episodes of pancreatitis, peripheral vascular disease, gout on " allopurinol 100 mg daily, and chronic inflammatory arthritis on Plaquenil 200 mg daily. He is doing well and denies joint pain, swelling, and medication adverse effects. Continue current medication.     Plan:     Problem List Items Addressed This Visit          ID    Long-term use of Plaquenil    Relevant Orders    C-Reactive Protein    CBC Auto Differential    Creatinine, Serum    ALT (SGPT)    AST (SGOT)    Sedimentation rate    Uric Acid       Immunology/Multi System    Rheumatoid arthritis involving multiple sites with positive rheumatoid factor - Primary    Relevant Orders    C-Reactive Protein    CBC Auto Differential    Creatinine, Serum    ALT (SGPT)    AST (SGOT)    Sedimentation rate    Uric Acid       Oncology    Bicytopenia       Orthopedic    Gout    Relevant Orders    C-Reactive Protein    CBC Auto Differential    Creatinine, Serum    ALT (SGPT)    AST (SGOT)    Sedimentation rate    Uric Acid     Other Visit Diagnoses       Encounter for long-term (current) drug use        Relevant Orders    C-Reactive Protein    CBC Auto Differential    Creatinine, Serum    ALT (SGPT)    AST (SGOT)    Sedimentation rate    Uric Acid          1.) Chronic inflammatory arthritis  2.) Long term use of Plaquenil   - Plaquenil 200mg daily  - Plaquenil eye exam yearly    3.) Gout: uric acid at goal  - uric acid before follow up  - CBC, CMP before follow up      Follow up in 6 months    30 minutes of total time spent on the encounter, which includes face to face time and non-face to face time preparing to see the patient (eg, review of tests), Obtaining and/or reviewing separately obtained history, Documenting clinical information in the electronic or other health record, Independently interpreting results (not separately reported) and communicating results to the patient/family/caregiver, or Care coordination (not separately reported).     This note was prepared with M [employer] Flu[employer] Direct voice recognition transcription  software. Garbled syntax, mangled pronouns, and other bizarre constructions may be attributed to that software system       Gracia Chow M.D.  Rheumatology Dept  [place], [place]

## 2024-05-02 ENCOUNTER — OFFICE VISIT (OUTPATIENT)
Dept: RHEUMATOLOGY | Facility: CLINIC | Age: 84
End: 2024-05-02
Payer: MEDICARE

## 2024-05-02 VITALS
BODY MASS INDEX: 28.54 KG/M2 | HEIGHT: 72 IN | DIASTOLIC BLOOD PRESSURE: 69 MMHG | SYSTOLIC BLOOD PRESSURE: 126 MMHG | WEIGHT: 210.75 LBS | HEART RATE: 69 BPM

## 2024-05-02 DIAGNOSIS — M10.9 GOUT, UNSPECIFIED CAUSE, UNSPECIFIED CHRONICITY, UNSPECIFIED SITE: ICD-10-CM

## 2024-05-02 DIAGNOSIS — Z79.899 ENCOUNTER FOR LONG-TERM (CURRENT) DRUG USE: ICD-10-CM

## 2024-05-02 DIAGNOSIS — M05.79 RHEUMATOID ARTHRITIS INVOLVING MULTIPLE SITES WITH POSITIVE RHEUMATOID FACTOR: Primary | ICD-10-CM

## 2024-05-02 DIAGNOSIS — Z79.899 LONG-TERM USE OF PLAQUENIL: ICD-10-CM

## 2024-05-02 DIAGNOSIS — D75.89 BICYTOPENIA: ICD-10-CM

## 2024-05-02 LAB
OHS QRS DURATION: 156 MS
OHS QTC CALCULATION: 482 MS

## 2024-05-02 PROCEDURE — 1126F AMNT PAIN NOTED NONE PRSNT: CPT | Mod: CPTII,S$GLB,, | Performed by: STUDENT IN AN ORGANIZED HEALTH CARE EDUCATION/TRAINING PROGRAM

## 2024-05-02 PROCEDURE — 1101F PT FALLS ASSESS-DOCD LE1/YR: CPT | Mod: CPTII,S$GLB,, | Performed by: STUDENT IN AN ORGANIZED HEALTH CARE EDUCATION/TRAINING PROGRAM

## 2024-05-02 PROCEDURE — 99999 PR PBB SHADOW E&M-EST. PATIENT-LVL III: CPT | Mod: PBBFAC,,, | Performed by: STUDENT IN AN ORGANIZED HEALTH CARE EDUCATION/TRAINING PROGRAM

## 2024-05-02 PROCEDURE — 3078F DIAST BP <80 MM HG: CPT | Mod: CPTII,S$GLB,, | Performed by: STUDENT IN AN ORGANIZED HEALTH CARE EDUCATION/TRAINING PROGRAM

## 2024-05-02 PROCEDURE — 1159F MED LIST DOCD IN RCRD: CPT | Mod: CPTII,S$GLB,, | Performed by: STUDENT IN AN ORGANIZED HEALTH CARE EDUCATION/TRAINING PROGRAM

## 2024-05-02 PROCEDURE — 99214 OFFICE O/P EST MOD 30 MIN: CPT | Mod: S$GLB,,, | Performed by: STUDENT IN AN ORGANIZED HEALTH CARE EDUCATION/TRAINING PROGRAM

## 2024-05-02 PROCEDURE — 3288F FALL RISK ASSESSMENT DOCD: CPT | Mod: CPTII,S$GLB,, | Performed by: STUDENT IN AN ORGANIZED HEALTH CARE EDUCATION/TRAINING PROGRAM

## 2024-05-02 PROCEDURE — 3074F SYST BP LT 130 MM HG: CPT | Mod: CPTII,S$GLB,, | Performed by: STUDENT IN AN ORGANIZED HEALTH CARE EDUCATION/TRAINING PROGRAM

## 2024-05-03 ENCOUNTER — HOSPITAL ENCOUNTER (OUTPATIENT)
Dept: PULMONOLOGY | Facility: HOSPITAL | Age: 84
Discharge: HOME | End: 2024-05-03
Attending: NURSE PRACTITIONER
Payer: MEDICARE

## 2024-05-03 DIAGNOSIS — R06.00 DYSPNEA, UNSPECIFIED TYPE: ICD-10-CM

## 2024-05-03 PROCEDURE — 94010 BREATHING CAPACITY TEST: CPT

## 2024-05-03 PROCEDURE — 94727 GAS DIL/WSHOT DETER LNG VOL: CPT

## 2024-05-03 PROCEDURE — 94729 DIFFUSING CAPACITY: CPT

## 2024-05-06 ENCOUNTER — TELEPHONE (OUTPATIENT)
Dept: PULMONOLOGY | Facility: CLINIC | Age: 84
End: 2024-05-06

## 2024-05-07 LAB
OHS QRS DURATION: 156 MS
OHS QTC CALCULATION: 468 MS

## 2024-05-09 ENCOUNTER — OFFICE VISIT (OUTPATIENT)
Dept: PULMONOLOGY | Facility: CLINIC | Age: 84
End: 2024-05-09
Payer: MEDICARE

## 2024-05-09 VITALS
SYSTOLIC BLOOD PRESSURE: 120 MMHG | DIASTOLIC BLOOD PRESSURE: 62 MMHG | BODY MASS INDEX: 28.75 KG/M2 | OXYGEN SATURATION: 98 % | HEART RATE: 65 BPM | WEIGHT: 212 LBS

## 2024-05-09 DIAGNOSIS — M06.9 RHEUMATOID ARTHRITIS, INVOLVING UNSPECIFIED SITE, UNSPECIFIED WHETHER RHEUMATOID FACTOR PRESENT: ICD-10-CM

## 2024-05-09 DIAGNOSIS — R94.2 DECREASED DIFFUSION CAPACITY: ICD-10-CM

## 2024-05-09 DIAGNOSIS — R06.00 DYSPNEA, UNSPECIFIED TYPE: Primary | ICD-10-CM

## 2024-05-09 PROCEDURE — 1159F MED LIST DOCD IN RCRD: CPT | Mod: CPTII,S$GLB,, | Performed by: NURSE PRACTITIONER

## 2024-05-09 PROCEDURE — 1126F AMNT PAIN NOTED NONE PRSNT: CPT | Mod: CPTII,S$GLB,, | Performed by: NURSE PRACTITIONER

## 2024-05-09 PROCEDURE — 99213 OFFICE O/P EST LOW 20 MIN: CPT | Mod: S$GLB,,, | Performed by: NURSE PRACTITIONER

## 2024-05-09 PROCEDURE — 3078F DIAST BP <80 MM HG: CPT | Mod: CPTII,S$GLB,, | Performed by: NURSE PRACTITIONER

## 2024-05-09 PROCEDURE — 3074F SYST BP LT 130 MM HG: CPT | Mod: CPTII,S$GLB,, | Performed by: NURSE PRACTITIONER

## 2024-05-09 RX ORDER — MULTIVITAMIN
1 TABLET ORAL DAILY
COMMUNITY

## 2024-05-09 NOTE — PROGRESS NOTES
SUBJECTIVE:    Patient ID: Kennedy Thomas is a 83 y.o. male.    Chief Complaint: Follow-up (3 week follow up /)    HPI    PFT shows no obstruction, mild restriction, moderate diffusion defect.  He is short of breath with exertion.  He had pacemaker generator change since last visit.  He sleeps on his oxygen, states he has BILL but he could not tolerate CPAP.  He is taking Plaquenil for RA.    Past Medical History:   Diagnosis Date    Arthritis     Atrial fibrillation     CHF (congestive heart failure)     CKD (chronic kidney disease)     COPD (chronic obstructive pulmonary disease)     Diabetes mellitus, type 2     Gout     Heart block     Hypertension     Pancreatitis     Peripheral vascular disease      Past Surgical History:   Procedure Laterality Date    APPENDECTOMY      CARDIAC PACEMAKER PLACEMENT  2010    ERCP N/A 7/12/2022    Procedure: ERCP (ENDOSCOPIC RETROGRADE CHOLANGIOPANCREATOGRAPHY);  Surgeon: Dale Luciano III, MD;  Location: [facility] ENDO;  Service: Endoscopy;  Laterality: N/A;    EYE SURGERY      INGUINAL HERNIA REPAIR Left     LAPAROSCOPIC CHOLECYSTECTOMY WITH CHOLANGIOGRAPHY N/A 7/11/2022    Procedure: CHOLECYSTECTOMY, LAPAROSCOPIC, WITH CHOLANGIOGRAM;  Surgeon: Santa Cardona MD;  Location: [facility] OR;  Service: General;  Laterality: N/A;    REPLACEMENT OF PACEMAKER GENERATOR N/A 4/19/2024    Procedure: REPLACEMENT, PACEMAKER GENERATOR;  Surgeon: Marcus Ledbetter MD;  Location: [facility] CATH/EP LAB;  Service: Cardiology;  Laterality: N/A;    UMBILICAL HERNIA REPAIR  7/11/2022    Procedure: REPAIR, HERNIA, UMBILICAL, AGE 5 YEARS OR OLDER;  Surgeon: Santa Cardona MD;  Location: [facility] OR;  Service: General;;     Family History   Problem Relation Name Age of Onset    Diabetes Mellitus Mother          Social History:   Marital Status:   Occupation: retired diver   Alcohol History:  reports no history of alcohol use.  Tobacco History:  reports that he quit smoking about 73 years ago. His  smoking use included cigarettes. He started smoking about 50 years ago. He has never used smokeless tobacco.  Drug History:  reports no history of drug use.    Review of patient's allergies indicates:   Allergen Reactions    Statins-hmg-coa reductase inhibitors      Pancreatitis         Current Outpatient Medications   Medication Sig Dispense Refill    allopurinoL (ZYLOPRIM) 100 MG tablet TAKE 1 TABLET(100 MG) BY MOUTH EVERY DAY (Patient taking differently: Take 100 mg by mouth nightly.) 90 tablet 3    benazepril (LOTENSIN) 40 MG tablet Take 20 mg by mouth with lunch. At noon      ELIQUIS 5 mg Tab Take 1 tablet (5 mg total) by mouth 2 (two) times daily. 90 tablet 3    hydroxychloroquine (PLAQUENIL) 200 mg tablet Take 0.5 tablets (100 mg total) by mouth once daily. 90 tablet 1    metoprolol succinate (TOPROL-XL) 50 MG 24 hr tablet Take 50 mg by mouth once daily.      multivitamin (THERAGRAN) per tablet Take 1 tablet by mouth once daily.      spironolactone (ALDACTONE) 25 MG tablet Take 25 mg by mouth.      terazosin (HYTRIN) 5 MG capsule Take 5 mg by mouth every evening.      torsemide (DEMADEX) 20 MG Tab Take 20 mg by mouth once daily.      vitamin D (VITAMIN D3) 1000 units Tab Take 5,000 Units by mouth.       No current facility-administered medications for this visit.           Review of Systems  General: Feeling Well.  Eyes: Vision is good.  ENT:  No sinusitis or pharyngitis.   Heart:: No chest pain or palpitations.  Lungs: dyspnea with exertion   GI: No Nausea, vomiting, constipation, diarrhea, or reflux.  : No dysuria, hesitancy, or nocturia.  Musculoskeletal: No joint pain or myalgias.  Skin: No lesions or rashes.  Neuro: No headaches or neuropathy.  Lymph: No edema or adenopathy.  Psych: No anxiety or depression.  Endo: No weight change.    OBJECTIVE:      /62 (BP Location: Right arm, Patient Position: Sitting, BP Method: Medium (Manual))   Pulse 65   Wt 96.2 kg (212 lb)   SpO2 98%   BMI 28.75  kg/m²     Physical Exam  GENERAL: Older patient in no distress.  HEENT: Pupils equal and reactive. Extraocular movements intact. Nose intact.      Pharynx moist.  NECK: Supple.   HEART: Regular rate and rhythm. No murmur or gallop auscultated.  LUNGS: Clear to auscultation and percussion. Lung excursion symmetrical. No change in fremitus. No adventitial noises.  ABDOMEN: Bowel sounds present. Non-tender, no masses palpated.  EXTREMITIES: Normal muscle tone and joint movement, no cyanosis or clubbing.   LYMPHATICS: No adenopathy palpated, no edema.  SKIN: Dry, intact, no lesions.   NEURO: Cranial nerves II-XII intact. Motor strength 5/5 bilaterally, upper and lower extremities.  PSYCH: Appropriate affect.    Assessment:       1. Dyspnea, unspecified type    2. Rheumatoid arthritis, involving unspecified site, unspecified whether rheumatoid factor present    3. Decreased diffusion capacity            Plan:       Dyspnea, unspecified type  -     CT Chest Without Contrast; Future    Rheumatoid arthritis, involving unspecified site, unspecified whether rheumatoid factor present  -     CT Chest Without Contrast; Future    Decreased diffusion capacity  -     Echo; Future  -     CT Chest Without Contrast; Future      CT chest   ECHO to assess PA pressures   Can stop the Symbicort   Follow up in about 3 months (around 8/9/2024).

## 2024-05-16 ENCOUNTER — HOSPITAL ENCOUNTER (OUTPATIENT)
Dept: CARDIOLOGY | Facility: HOSPITAL | Age: 84
Discharge: HOME | End: 2024-05-16
Attending: NURSE PRACTITIONER
Payer: MEDICARE

## 2024-05-16 ENCOUNTER — HOSPITAL ENCOUNTER (OUTPATIENT)
Dept: RADIOLOGY | Facility: HOSPITAL | Age: 84
Discharge: HOME | End: 2024-05-16
Attending: NURSE PRACTITIONER
Payer: MEDICARE

## 2024-05-16 ENCOUNTER — TELEPHONE (OUTPATIENT)
Dept: PULMONOLOGY | Facility: CLINIC | Age: 84
End: 2024-05-16

## 2024-05-16 VITALS — BODY MASS INDEX: 28.71 KG/M2 | HEIGHT: 72 IN | WEIGHT: 212 LBS

## 2024-05-16 DIAGNOSIS — M06.9 RHEUMATOID ARTHRITIS, INVOLVING UNSPECIFIED SITE, UNSPECIFIED WHETHER RHEUMATOID FACTOR PRESENT: ICD-10-CM

## 2024-05-16 DIAGNOSIS — R94.2 DECREASED DIFFUSION CAPACITY: ICD-10-CM

## 2024-05-16 DIAGNOSIS — R06.00 DYSPNEA, UNSPECIFIED TYPE: ICD-10-CM

## 2024-05-16 PROCEDURE — 71250 CT THORAX DX C-: CPT | Mod: TC

## 2024-05-16 PROCEDURE — 93306 TTE W/DOPPLER COMPLETE: CPT

## 2024-05-16 PROCEDURE — 93306 TTE W/DOPPLER COMPLETE: CPT | Mod: 26,,, | Performed by: GENERAL PRACTICE

## 2024-05-16 PROCEDURE — 71250 CT THORAX DX C-: CPT | Mod: 26,,, | Performed by: RADIOLOGY

## 2024-05-16 NOTE — TELEPHONE ENCOUNTER
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

## 2024-05-17 LAB
AORTIC ROOT ANNULUS: 4.4 CM
AORTIC VALVE CUSP SEPERATION: 1.7 CM
AV INDEX (PROSTH): 0.6
AV MEAN GRADIENT: 3 MMHG
AV PEAK GRADIENT: 6 MMHG
AV VALVE AREA BY VELOCITY RATIO: 2.39 CM²
AV VALVE AREA: 2.51 CM²
AV VELOCITY RATIO: 0.58
BSA FOR ECHO PROCEDURE: 2.21 M2
CV ECHO LV RWT: 0.42 CM
DOP CALC AO PEAK VEL: 1.25 M/S
DOP CALC AO VTI: 25.8 CM
DOP CALC LVOT AREA: 4.2 CM2
DOP CALC LVOT DIAMETER: 2.3 CM
DOP CALC LVOT PEAK VEL: 0.72 M/S
DOP CALC LVOT STROKE VOLUME: 64.78 CM3
DOP CALCLVOT PEAK VEL VTI: 15.6 CM
E WAVE DECELERATION TIME: 206 MSEC
E/A RATIO: 2.38
E/E' RATIO: 8.1 M/S
ECHO LV POSTERIOR WALL: 1.07 CM (ref 0.6–1.1)
FRACTIONAL SHORTENING: 24 % (ref 28–44)
INTERVENTRICULAR SEPTUM: 1.33 CM (ref 0.6–1.1)
IVRT: 106 MSEC
LEFT ATRIUM SIZE: 6 CM
LEFT INTERNAL DIMENSION IN SYSTOLE: 3.9 CM (ref 2.1–4)
LEFT VENTRICLE DIASTOLIC VOLUME INDEX: 56.88 ML/M2
LEFT VENTRICLE DIASTOLIC VOLUME: 124 ML
LEFT VENTRICLE MASS INDEX: 111 G/M2
LEFT VENTRICLE SYSTOLIC VOLUME INDEX: 30.2 ML/M2
LEFT VENTRICLE SYSTOLIC VOLUME: 65.9 ML
LEFT VENTRICULAR INTERNAL DIMENSION IN DIASTOLE: 5.11 CM (ref 3.5–6)
LEFT VENTRICULAR MASS: 241.99 G
LV LATERAL E/E' RATIO: 6.75 M/S
LV SEPTAL E/E' RATIO: 10.13 M/S
LVOT MG: 1 MMHG
LVOT MV: 0.46 CM/S
MV PEAK A VEL: 0.34 M/S
MV PEAK E VEL: 0.81 M/S
MV STENOSIS PRESSURE HALF TIME: 65 MS
MV VALVE AREA P 1/2 METHOD: 3.38 CM2
OHS CV RV/LV RATIO: 0.64 CM
OHS LV EJECTION FRACTION SIMPSONS BIPLANE MOD: 44 %
PISA TR MAX VEL: 2.61 M/S
RIGHT VENTRICULAR END-DIASTOLIC DIMENSION: 3.26 CM
TDI LATERAL: 0.12 M/S
TDI SEPTAL: 0.08 M/S
TDI: 0.1 M/S
TR MAX PG: 27 MMHG
Z-SCORE OF LEFT VENTRICULAR DIMENSION IN END DIASTOLE: -3.55
Z-SCORE OF LEFT VENTRICULAR DIMENSION IN END SYSTOLE: -1.01

## 2024-05-20 ENCOUNTER — TELEPHONE (OUTPATIENT)
Dept: PULMONOLOGY | Facility: CLINIC | Age: 84
End: 2024-05-20

## 2024-05-20 NOTE — TELEPHONE ENCOUNTER
Echo results      Left Ventricle: The left ventricle is normal in size. Increased wall thickness. There is eccentric hypertrophy. Regional wall motion abnormalities present. Septal motion is consistent with pacing. There is moderately reduced systolic function with a visually estimated ejection fraction of 35 - 40%. Unable to assess diastolic function due to atrial fibrillation.    Right Ventricle: Mild right ventricular enlargement. Systolic function is borderline low. Pacemaker lead present in the ventricle.    Left Atrium: Left atrium is severely dilated.    Right Atrium: Lead present in the right atrium.    Aortic Valve: The aortic valve is a trileaflet valve. There is moderate aortic valve sclerosis.    Mitral Valve: There is mild to moderate regurgitation.    Tricuspid Valve: There is mild to moderate regurgitation. The estimated PA systolic pressure is at least 27 mmHg.    Pulmonic Valve: Not well visualized due to poor acoustic window.

## 2024-05-20 NOTE — TELEPHONE ENCOUNTER
Told patient about his calcified pleural plaques and his coronary artery disease.  He states Michael Zapata's his cardiologist.  He will inform him of his coronary artery disease.

## 2024-07-17 ENCOUNTER — TELEPHONE (OUTPATIENT)
Dept: OPHTHALMOLOGY | Facility: CLINIC | Age: 84
End: 2024-07-17
Payer: MEDICARE

## 2024-08-08 ENCOUNTER — OFFICE VISIT (OUTPATIENT)
Dept: PULMONOLOGY | Facility: CLINIC | Age: 84
End: 2024-08-08
Payer: MEDICARE

## 2024-08-08 VITALS
BODY MASS INDEX: 27.9 KG/M2 | SYSTOLIC BLOOD PRESSURE: 130 MMHG | OXYGEN SATURATION: 98 % | HEART RATE: 64 BPM | HEIGHT: 72 IN | DIASTOLIC BLOOD PRESSURE: 80 MMHG | WEIGHT: 206 LBS

## 2024-08-08 DIAGNOSIS — R94.2 DECREASED DIFFUSION CAPACITY: Primary | ICD-10-CM

## 2024-08-08 DIAGNOSIS — M06.9 RHEUMATOID ARTHRITIS, INVOLVING UNSPECIFIED SITE, UNSPECIFIED WHETHER RHEUMATOID FACTOR PRESENT: ICD-10-CM

## 2024-08-08 DIAGNOSIS — G47.34 NOCTURNAL HYPOXEMIA: ICD-10-CM

## 2024-08-08 DIAGNOSIS — J92.0 PLEURAL PLAQUE DUE TO ASBESTOS EXPOSURE: ICD-10-CM

## 2024-08-08 PROCEDURE — 3075F SYST BP GE 130 - 139MM HG: CPT | Mod: CPTII,S$GLB,, | Performed by: NURSE PRACTITIONER

## 2024-08-08 PROCEDURE — 1159F MED LIST DOCD IN RCRD: CPT | Mod: CPTII,S$GLB,, | Performed by: NURSE PRACTITIONER

## 2024-08-08 PROCEDURE — 1126F AMNT PAIN NOTED NONE PRSNT: CPT | Mod: CPTII,S$GLB,, | Performed by: NURSE PRACTITIONER

## 2024-08-08 PROCEDURE — 99212 OFFICE O/P EST SF 10 MIN: CPT | Mod: S$GLB,,, | Performed by: NURSE PRACTITIONER

## 2024-08-08 PROCEDURE — 3079F DIAST BP 80-89 MM HG: CPT | Mod: CPTII,S$GLB,, | Performed by: NURSE PRACTITIONER

## 2024-08-08 PROCEDURE — 99999 PR PBB SHADOW E&M-EST. PATIENT-LVL III: CPT | Mod: PBBFAC,,, | Performed by: NURSE PRACTITIONER

## 2025-08-11 ENCOUNTER — OFFICE VISIT (OUTPATIENT)
Dept: PULMONOLOGY | Facility: CLINIC | Age: 85
End: 2025-08-11
Payer: MEDICARE

## 2025-08-11 VITALS
WEIGHT: 203.63 LBS | OXYGEN SATURATION: 98 % | BODY MASS INDEX: 27.58 KG/M2 | DIASTOLIC BLOOD PRESSURE: 72 MMHG | SYSTOLIC BLOOD PRESSURE: 132 MMHG | HEIGHT: 72 IN | HEART RATE: 82 BPM

## 2025-08-11 DIAGNOSIS — M06.9 RHEUMATOID ARTHRITIS, INVOLVING UNSPECIFIED SITE, UNSPECIFIED WHETHER RHEUMATOID FACTOR PRESENT: ICD-10-CM

## 2025-08-11 DIAGNOSIS — R94.2 DECREASED DIFFUSION CAPACITY: ICD-10-CM

## 2025-08-11 DIAGNOSIS — J92.0 PLEURAL PLAQUE DUE TO ASBESTOS EXPOSURE: Primary | ICD-10-CM

## 2025-08-11 DIAGNOSIS — G47.34 NOCTURNAL HYPOXEMIA: ICD-10-CM

## 2025-08-11 PROCEDURE — 3288F FALL RISK ASSESSMENT DOCD: CPT | Mod: CPTII,S$GLB,, | Performed by: NURSE PRACTITIONER

## 2025-08-11 PROCEDURE — 3078F DIAST BP <80 MM HG: CPT | Mod: CPTII,S$GLB,, | Performed by: NURSE PRACTITIONER

## 2025-08-11 PROCEDURE — 99213 OFFICE O/P EST LOW 20 MIN: CPT | Mod: S$GLB,,, | Performed by: NURSE PRACTITIONER

## 2025-08-11 PROCEDURE — 1101F PT FALLS ASSESS-DOCD LE1/YR: CPT | Mod: CPTII,S$GLB,, | Performed by: NURSE PRACTITIONER

## 2025-08-11 PROCEDURE — 99999 PR PBB SHADOW E&M-EST. PATIENT-LVL III: CPT | Mod: PBBFAC,,, | Performed by: NURSE PRACTITIONER

## 2025-08-11 PROCEDURE — 3075F SYST BP GE 130 - 139MM HG: CPT | Mod: CPTII,S$GLB,, | Performed by: NURSE PRACTITIONER

## 2025-08-11 PROCEDURE — 1126F AMNT PAIN NOTED NONE PRSNT: CPT | Mod: CPTII,S$GLB,, | Performed by: NURSE PRACTITIONER

## 2025-08-11 PROCEDURE — 1159F MED LIST DOCD IN RCRD: CPT | Mod: CPTII,S$GLB,, | Performed by: NURSE PRACTITIONER

## 2025-08-20 ENCOUNTER — TELEPHONE (OUTPATIENT)
Dept: PULMONOLOGY | Facility: CLINIC | Age: 85
End: 2025-08-20
Payer: MEDICARE

## 2025-08-20 DIAGNOSIS — G47.34 NOCTURNAL HYPOXEMIA: Primary | ICD-10-CM

## 2025-08-27 ENCOUNTER — HOSPITAL ENCOUNTER (OUTPATIENT)
Dept: PULMONOLOGY | Facility: HOSPITAL | Age: 85
Discharge: HOME | End: 2025-08-27
Attending: NURSE PRACTITIONER
Payer: MEDICARE

## 2025-08-27 ENCOUNTER — HOSPITAL ENCOUNTER (OUTPATIENT)
Dept: RADIOLOGY | Facility: HOSPITAL | Age: 85
Discharge: HOME | End: 2025-08-27
Attending: NURSE PRACTITIONER
Payer: MEDICARE

## 2025-08-27 DIAGNOSIS — R94.2 DECREASED DIFFUSION CAPACITY: ICD-10-CM

## 2025-08-27 DIAGNOSIS — J92.0 PLEURAL PLAQUE DUE TO ASBESTOS EXPOSURE: ICD-10-CM

## 2025-08-27 PROCEDURE — 94727 GAS DIL/WSHOT DETER LNG VOL: CPT

## 2025-08-27 PROCEDURE — 94010 BREATHING CAPACITY TEST: CPT

## 2025-08-27 PROCEDURE — 71046 X-RAY EXAM CHEST 2 VIEWS: CPT | Mod: TC

## 2025-08-27 PROCEDURE — 94729 DIFFUSING CAPACITY: CPT

## 2025-08-27 PROCEDURE — 94010 BREATHING CAPACITY TEST: CPT | Mod: 26,,, | Performed by: INTERNAL MEDICINE

## 2025-08-27 PROCEDURE — 94729 DIFFUSING CAPACITY: CPT | Mod: 26,,, | Performed by: INTERNAL MEDICINE

## 2025-08-27 PROCEDURE — 71046 X-RAY EXAM CHEST 2 VIEWS: CPT | Mod: 26,,, | Performed by: RADIOLOGY

## 2025-08-27 PROCEDURE — 94727 GAS DIL/WSHOT DETER LNG VOL: CPT | Mod: 26,,, | Performed by: INTERNAL MEDICINE

## 2025-08-29 ENCOUNTER — TELEPHONE (OUTPATIENT)
Dept: PULMONOLOGY | Facility: CLINIC | Age: 85
End: 2025-08-29
Payer: MEDICARE

## 2025-09-04 ENCOUNTER — TELEPHONE (OUTPATIENT)
Dept: PULMONOLOGY | Facility: CLINIC | Age: 85
End: 2025-09-04
Payer: MEDICARE

## 2025-09-04 DIAGNOSIS — G47.34 NOCTURNAL HYPOXEMIA: Primary | ICD-10-CM

## (undated) DEVICE — TROCAR OPTICAL ZTHREAD 12MMX100MM CTF73

## (undated) DEVICE — SOLUTION IRRI H2O BOTTLE 1000ML

## (undated) DEVICE — SOLUTION IRRI NS BOTTLE 1000ML R5200-01

## (undated) DEVICE — ELECTRODE OLSEN LAP SPATULA 13INX330MM

## (undated) DEVICE — PAD BOVIE ADULT

## (undated) DEVICE — CATHETER CHOLANGIAGRAM 18IN 4.5FR 20018-

## (undated) DEVICE — TROCAR BLUNT TIP 12 X 100MM  C0R85

## (undated) DEVICE — SUTURE VICRYL #0 27 UR-6 VCP603H

## (undated) DEVICE — TRAY GENERAL LAPAROSCOPY

## (undated) DEVICE — GLOVE BIOGELPI GOLD SIZE 7.5

## (undated) DEVICE — SCISSORS 5MM APPLIED MEDICAL   CB030

## (undated) DEVICE — SUTURE MONOCRYL 4-0 27 SH MCP415H

## (undated) DEVICE — SUCTION/IRRIGATOR W/TIP

## (undated) DEVICE — TROCAR ADVANCED FIXATION  CFF03

## (undated) DEVICE — DRESSING AQUACEL AG 3.5X10IN

## (undated) DEVICE — DISSECTOR KITTNER 13300

## (undated) DEVICE — HEMOSTAT D STAT FLOWABLE

## (undated) DEVICE — CLOSURE SKIN STERI STRIP 1/2X4

## (undated) DEVICE — CABLE MONOPOLAR 10FT DISPOSABLE

## (undated) DEVICE — POUCH RETRIEVAL 10MM APP. MED.     CD001

## (undated) DEVICE — APPLIER CLIP  5MM

## (undated) DEVICE — TROCAR BALL. BLNT HASSON C0R47

## (undated) DEVICE — SOLUTION NACL 0.9% 3000ML

## (undated) DEVICE — DEVICE PLASMABLADE X 3.0S LT